# Patient Record
Sex: MALE | Race: WHITE | NOT HISPANIC OR LATINO | Employment: OTHER | URBAN - METROPOLITAN AREA
[De-identification: names, ages, dates, MRNs, and addresses within clinical notes are randomized per-mention and may not be internally consistent; named-entity substitution may affect disease eponyms.]

---

## 2023-02-27 PROBLEM — N40.0 BPH (BENIGN PROSTATIC HYPERPLASIA): Status: ACTIVE | Noted: 2023-02-27

## 2023-02-27 PROBLEM — I10 HYPERTENSION: Status: ACTIVE | Noted: 2023-02-27

## 2023-02-27 PROBLEM — C45.9: Status: ACTIVE | Noted: 2023-02-27

## 2023-02-27 PROBLEM — M19.90 INFLAMMATORY ARTHROPATHY: Status: ACTIVE | Noted: 2023-02-27

## 2023-02-27 PROBLEM — R94.2 ABNORMAL PET OF RIGHT LUNG: Status: ACTIVE | Noted: 2023-02-27

## 2023-02-27 PROBLEM — J94.8 PLEURAL MASS: Status: ACTIVE | Noted: 2023-02-27

## 2023-02-27 PROBLEM — F33.41 RECURRENT MAJOR DEPRESSIVE DISORDER, IN PARTIAL REMISSION (CMS-HCC): Status: ACTIVE | Noted: 2023-02-27

## 2023-02-27 PROBLEM — Z20.822 CLOSE EXPOSURE TO COVID-19 VIRUS: Status: ACTIVE | Noted: 2023-02-27

## 2023-02-27 PROBLEM — M54.2 NECK PAIN: Status: ACTIVE | Noted: 2023-02-27

## 2023-02-27 PROBLEM — M77.9 BONE SPUR: Status: ACTIVE | Noted: 2023-02-27

## 2023-02-27 PROBLEM — E78.00 HYPERCHOLESTEROLEMIA: Status: ACTIVE | Noted: 2023-02-27

## 2023-02-27 PROBLEM — M54.17 LUMBOSACRAL NEURITIS: Status: ACTIVE | Noted: 2023-02-27

## 2023-02-27 PROBLEM — I25.10 CAD (CORONARY ARTERY DISEASE): Status: ACTIVE | Noted: 2023-02-27

## 2023-02-27 PROBLEM — N52.9 MALE ERECTILE DISORDER: Status: ACTIVE | Noted: 2023-02-27

## 2023-02-27 PROBLEM — R10.9 ABDOMINAL CRAMPING: Status: ACTIVE | Noted: 2023-02-27

## 2023-02-27 PROBLEM — G47.00 INSOMNIA: Status: ACTIVE | Noted: 2023-02-27

## 2023-02-27 PROBLEM — R97.20 ELEVATED PSA: Status: ACTIVE | Noted: 2023-02-27

## 2023-02-27 PROBLEM — B00.9 HSV-1 INFECTION: Status: ACTIVE | Noted: 2023-02-27

## 2023-02-27 PROBLEM — Z95.5 HISTORY OF HEART ARTERY STENT: Status: ACTIVE | Noted: 2023-02-27

## 2023-02-27 RX ORDER — DILTIAZEM HYDROCHLORIDE EXTENDED-RELEASE TABLETS 240 MG/1
1 TABLET, EXTENDED RELEASE ORAL DAILY
COMMUNITY
End: 2023-03-10 | Stop reason: SDUPTHER

## 2023-02-27 RX ORDER — LEVOTHYROXINE SODIUM 100 UG/1
1 TABLET ORAL DAILY
COMMUNITY
Start: 2022-09-22 | End: 2024-01-30 | Stop reason: SDUPTHER

## 2023-02-27 RX ORDER — DIPHENHYDRAMINE HCL 25 MG
CAPSULE ORAL
COMMUNITY
Start: 2022-11-03 | End: 2023-10-10 | Stop reason: ALTCHOICE

## 2023-02-27 RX ORDER — ATORVASTATIN CALCIUM 80 MG/1
1 TABLET, FILM COATED ORAL DAILY
COMMUNITY
Start: 2021-06-24 | End: 2023-03-23

## 2023-02-27 RX ORDER — PREDNISONE 2.5 MG/1
TABLET ORAL
COMMUNITY
Start: 2022-09-22 | End: 2023-10-10 | Stop reason: SDUPTHER

## 2023-02-27 RX ORDER — HYDROCHLOROTHIAZIDE 12.5 MG/1
1 TABLET ORAL DAILY
COMMUNITY
Start: 2022-01-07 | End: 2023-03-20 | Stop reason: SDUPTHER

## 2023-02-27 RX ORDER — HYDROXYZINE HYDROCHLORIDE 25 MG/1
TABLET, FILM COATED ORAL
COMMUNITY
Start: 2022-11-10 | End: 2023-10-10 | Stop reason: ALTCHOICE

## 2023-02-27 RX ORDER — DILTIAZEM HYDROCHLORIDE 240 MG/1
1 CAPSULE, EXTENDED RELEASE ORAL DAILY
COMMUNITY
Start: 2021-05-26 | End: 2023-03-10 | Stop reason: SDUPTHER

## 2023-02-27 RX ORDER — METOPROLOL SUCCINATE 50 MG/1
1 TABLET, EXTENDED RELEASE ORAL 2 TIMES DAILY
COMMUNITY
Start: 2013-07-03 | End: 2023-03-23

## 2023-02-27 RX ORDER — ASPIRIN 81 MG/1
TABLET ORAL
COMMUNITY

## 2023-02-27 RX ORDER — NITROGLYCERIN 0.4 MG/1
TABLET SUBLINGUAL
COMMUNITY
Start: 2013-08-14 | End: 2023-10-10 | Stop reason: SDUPTHER

## 2023-03-10 DIAGNOSIS — I10 PRIMARY HYPERTENSION: Primary | ICD-10-CM

## 2023-03-10 RX ORDER — DILTIAZEM HYDROCHLORIDE 240 MG/1
240 CAPSULE, EXTENDED RELEASE ORAL DAILY
Qty: 30 CAPSULE | Refills: 0 | Status: SHIPPED | OUTPATIENT
Start: 2023-03-10 | End: 2023-04-10

## 2023-03-20 DIAGNOSIS — I10 PRIMARY HYPERTENSION: Primary | ICD-10-CM

## 2023-03-20 RX ORDER — HYDROCHLOROTHIAZIDE 12.5 MG/1
12.5 TABLET ORAL DAILY
Qty: 90 TABLET | Refills: 1 | Status: SHIPPED | OUTPATIENT
Start: 2023-03-20 | End: 2023-10-10 | Stop reason: SDUPTHER

## 2023-03-23 DIAGNOSIS — E78.00 HYPERCHOLESTEROLEMIA: ICD-10-CM

## 2023-03-23 DIAGNOSIS — I10 PRIMARY HYPERTENSION: Primary | ICD-10-CM

## 2023-03-23 RX ORDER — METOPROLOL SUCCINATE 50 MG/1
TABLET, EXTENDED RELEASE ORAL
Qty: 180 TABLET | Refills: 0 | Status: SHIPPED | OUTPATIENT
Start: 2023-03-23 | End: 2023-05-30

## 2023-03-23 RX ORDER — ATORVASTATIN CALCIUM 80 MG/1
TABLET, FILM COATED ORAL
Qty: 90 TABLET | Refills: 0 | Status: SHIPPED | OUTPATIENT
Start: 2023-03-23 | End: 2023-05-30

## 2023-03-23 RX ORDER — DILTIAZEM HYDROCHLORIDE 240 MG/1
CAPSULE, COATED, EXTENDED RELEASE ORAL
Qty: 90 CAPSULE | Refills: 0 | Status: SHIPPED | OUTPATIENT
Start: 2023-03-23 | End: 2023-05-30

## 2023-04-07 ENCOUNTER — OFFICE VISIT (OUTPATIENT)
Dept: PRIMARY CARE | Facility: CLINIC | Age: 78
End: 2023-04-07
Payer: MEDICARE

## 2023-04-07 VITALS
HEIGHT: 69 IN | BODY MASS INDEX: 29.77 KG/M2 | TEMPERATURE: 97.5 F | HEART RATE: 72 BPM | WEIGHT: 201 LBS | RESPIRATION RATE: 12 BRPM | SYSTOLIC BLOOD PRESSURE: 132 MMHG | DIASTOLIC BLOOD PRESSURE: 72 MMHG | OXYGEN SATURATION: 98 %

## 2023-04-07 DIAGNOSIS — E78.00 HYPERCHOLESTEROLEMIA: ICD-10-CM

## 2023-04-07 DIAGNOSIS — I10 PRIMARY HYPERTENSION: ICD-10-CM

## 2023-04-07 DIAGNOSIS — N40.1 BENIGN PROSTATIC HYPERPLASIA WITH NOCTURIA: ICD-10-CM

## 2023-04-07 DIAGNOSIS — M19.90 INFLAMMATORY ARTHROPATHY: ICD-10-CM

## 2023-04-07 DIAGNOSIS — I25.10 CORONARY ARTERY DISEASE INVOLVING NATIVE CORONARY ARTERY OF NATIVE HEART WITHOUT ANGINA PECTORIS: ICD-10-CM

## 2023-04-07 DIAGNOSIS — C45.9 MALIGNANT NEOPLASM OF MESOTHELIAL TISSUE (MULTI): Primary | ICD-10-CM

## 2023-04-07 DIAGNOSIS — R35.1 BENIGN PROSTATIC HYPERPLASIA WITH NOCTURIA: ICD-10-CM

## 2023-04-07 DIAGNOSIS — R97.20 ELEVATED PSA: ICD-10-CM

## 2023-04-07 DIAGNOSIS — F33.41 RECURRENT MAJOR DEPRESSIVE DISORDER, IN PARTIAL REMISSION (CMS-HCC): ICD-10-CM

## 2023-04-07 PROBLEM — J94.8 PLEURAL MASS: Status: RESOLVED | Noted: 2023-02-27 | Resolved: 2023-04-07

## 2023-04-07 PROBLEM — G47.00 INSOMNIA: Status: RESOLVED | Noted: 2023-02-27 | Resolved: 2023-04-07

## 2023-04-07 PROBLEM — R10.9 ABDOMINAL CRAMPING: Status: RESOLVED | Noted: 2023-02-27 | Resolved: 2023-04-07

## 2023-04-07 PROCEDURE — 3078F DIAST BP <80 MM HG: CPT | Performed by: FAMILY MEDICINE

## 2023-04-07 PROCEDURE — 3075F SYST BP GE 130 - 139MM HG: CPT | Performed by: FAMILY MEDICINE

## 2023-04-07 PROCEDURE — 99214 OFFICE O/P EST MOD 30 MIN: CPT | Performed by: FAMILY MEDICINE

## 2023-04-07 PROCEDURE — 1159F MED LIST DOCD IN RCRD: CPT | Performed by: FAMILY MEDICINE

## 2023-04-07 PROCEDURE — 1036F TOBACCO NON-USER: CPT | Performed by: FAMILY MEDICINE

## 2023-04-07 ASSESSMENT — ENCOUNTER SYMPTOMS
DIFFICULTY URINATING: 0
DYSURIA: 0
CHILLS: 0
COUGH: 0
DIARRHEA: 0
BLOOD IN STOOL: 0
DYSPHORIC MOOD: 0
ARTHRALGIAS: 1
CHEST TIGHTNESS: 0
HEADACHES: 0
CONSTIPATION: 0
BACK PAIN: 0
ABDOMINAL DISTENTION: 0
ABDOMINAL PAIN: 0
WEAKNESS: 0
FATIGUE: 0
ADENOPATHY: 0
EYE PAIN: 0
DIZZINESS: 0
EYE REDNESS: 0
APPETITE CHANGE: 0
SHORTNESS OF BREATH: 0
LOSS OF SENSATION IN FEET: 1
NERVOUS/ANXIOUS: 0
DEPRESSION: 0
BRUISES/BLEEDS EASILY: 0
SORE THROAT: 0
OCCASIONAL FEELINGS OF UNSTEADINESS: 0
FEVER: 0

## 2023-04-07 NOTE — PROGRESS NOTES
Subjective   Patient ID: Ehsan Dias is a 77 y.o. male who presents for Follow-up.    HPI     Review of Systems    Objective   There were no vitals taken for this visit.    Physical Exam    Assessment/Plan

## 2023-04-07 NOTE — PROGRESS NOTES
Subjective   Patient ID: Ehsan Dias is a 77 y.o. male who presents for Follow-up.  Still on treatment for Mesothelioma, Nivolumab. Still having issues with inflammatory arthropathy up to 10mg on Pred from Rheumatology. Also taking Tylenol as needed.     Pt has chronic HTN.  Pt is taking Dilt, hydrochlorothiazide, Metoprolol. Tolerating well.  Exercising 0 days per week   Low sodium diet is usually being followed.   Is not monitoring home blood pressures.  Denies HA, vision changes or CP.     Pt has Dyslipidemia.   Lipid panel due to recheck.  Currently taking Atorvastatin and is tolerating well without muscle pains or weakness.     BPH/ elevated PSA is pending paulson with Urology  . Pt has 1-2 x nightly nocturia. Urinary stream is good and he denies difficulty starting urination or emptying bladder.     Pt has chronic, stable hypothyroidism.   Taking Synthroid  TSH  in good range.  Energy is  good. Pt denies significant weight gain, low mood, constipation, or skin changes.   Medication is tolerated well without anxiety, tremors, palpitations, involuntary weight loss, heat intolerance or diarrhea.         Review of Systems   Constitutional:  Negative for appetite change, chills, fatigue and fever.   HENT:  Negative for congestion, hearing loss and sore throat.    Eyes:  Negative for pain, redness and visual disturbance.   Respiratory:  Negative for cough, chest tightness and shortness of breath.    Cardiovascular:  Negative for chest pain and leg swelling.   Gastrointestinal:  Negative for abdominal distention, abdominal pain, blood in stool, constipation and diarrhea.   Genitourinary:  Negative for difficulty urinating and dysuria.   Musculoskeletal:  Positive for arthralgias. Negative for back pain.   Skin:  Negative for rash.   Neurological:  Negative for dizziness, weakness and headaches.   Hematological:  Negative for adenopathy. Does not bruise/bleed easily.   Psychiatric/Behavioral:  Negative for dysphoric  "mood. The patient is not nervous/anxious.        Objective   /72   Pulse 72   Temp 36.4 °C (97.5 °F)   Resp 12   Ht 1.753 m (5' 9\")   Wt 91.2 kg (201 lb)   SpO2 98%   BMI 29.68 kg/m²    Physical Exam  Constitutional:       General: He is not in acute distress.     Appearance: Normal appearance.   Cardiovascular:      Rate and Rhythm: Normal rate and regular rhythm.      Heart sounds: Normal heart sounds. No murmur heard.  Pulmonary:      Effort: Pulmonary effort is normal.      Breath sounds: Normal breath sounds.   Abdominal:      Palpations: Abdomen is soft.      Tenderness: There is no abdominal tenderness.   Neurological:      Mental Status: He is alert.   Psychiatric:         Mood and Affect: Mood normal.         Judgment: Judgment normal.           Assessment/Plan   Diagnoses and all orders for this visit:  Malignant neoplasm of mesothelial tissue (CMS/HCC) - doing better, still having joint issues, to see Rhuem. Continue to monitor with oncology  Primary hypertension - stable, continue current meds and monitor.  Coronary artery disease  - hx stent, stable   Inflammatory arthropathy - worsening, on higher dose Pred, keep plan to see Rheum  Hypercholesterolemia - doing well on statin, recheck with labs.   Recurrent major depressive disorder, in partial remission (CMS/HCC) -stable without medication or counseling, monitor  Benign prostatic hyperplasia with nocturia/Elevated PSA - keep plan to see Urology.  Weight - recommend low carb diet, increasing water intake to at least 64oz/day, healthy snacking between meals, and regular cardiovascular exercise 150mins/week. Goal for weight loss is 1-2# per week.        "

## 2023-04-09 DIAGNOSIS — I10 PRIMARY HYPERTENSION: ICD-10-CM

## 2023-04-10 RX ORDER — DILTIAZEM HYDROCHLORIDE 240 MG/1
CAPSULE, EXTENDED RELEASE ORAL
Qty: 90 CAPSULE | Refills: 1 | Status: SHIPPED | OUTPATIENT
Start: 2023-04-10 | End: 2023-10-10 | Stop reason: ALTCHOICE

## 2023-05-01 LAB
MUCUS, URINE: ABNORMAL /LPF
RBC, URINE: 9 /HPF (ref 0–5)
SQUAMOUS EPITHELIAL CELLS, URINE: <1 /HPF
WBC CLUMPS, URINE: ABNORMAL /HPF
WBC, URINE: 4 /HPF (ref 0–5)

## 2023-05-12 LAB
HEPATITIS B VIRUS CORE IGM AB PRESENCE IN SER/PLAS BY IMMUNOASSY: NONREACTIVE
HEPATITIS B VIRUS SURFACE AG PRESENCE IN SERUM: NONREACTIVE
HEPATITIS C VIRUS AB PRESENCE IN SERUM: NONREACTIVE

## 2023-05-16 LAB — GLUCOSE-6-PHOSPHATE DEHYDROGENASE, QUANT: 16.8 U/G HB (ref 9.9–16.6)

## 2023-05-28 DIAGNOSIS — I10 PRIMARY HYPERTENSION: ICD-10-CM

## 2023-05-28 DIAGNOSIS — E78.00 HYPERCHOLESTEROLEMIA: ICD-10-CM

## 2023-05-30 DIAGNOSIS — J20.9 ACUTE BRONCHITIS, UNSPECIFIED: ICD-10-CM

## 2023-05-30 RX ORDER — ATORVASTATIN CALCIUM 80 MG/1
TABLET, FILM COATED ORAL
Qty: 90 TABLET | Refills: 1 | Status: SHIPPED | OUTPATIENT
Start: 2023-05-30 | End: 2023-10-10 | Stop reason: SDUPTHER

## 2023-05-30 RX ORDER — METOPROLOL SUCCINATE 50 MG/1
TABLET, EXTENDED RELEASE ORAL
Qty: 180 TABLET | Refills: 1 | Status: SHIPPED | OUTPATIENT
Start: 2023-05-30 | End: 2023-10-10 | Stop reason: SDUPTHER

## 2023-05-30 RX ORDER — DILTIAZEM HYDROCHLORIDE 240 MG/1
CAPSULE, COATED, EXTENDED RELEASE ORAL
Qty: 90 CAPSULE | Refills: 1 | Status: SHIPPED | OUTPATIENT
Start: 2023-05-30 | End: 2023-10-10 | Stop reason: SDUPTHER

## 2023-05-30 RX ORDER — ALBUTEROL SULFATE 90 UG/1
AEROSOL, METERED RESPIRATORY (INHALATION)
Qty: 18 G | Refills: 1 | Status: SHIPPED | OUTPATIENT
Start: 2023-05-30 | End: 2024-01-18 | Stop reason: WASHOUT

## 2023-09-15 PROBLEM — D50.0 ANEMIA DUE TO BLOOD LOSS: Status: ACTIVE | Noted: 2023-09-15

## 2023-09-15 PROBLEM — R18.8 ASCITES: Status: ACTIVE | Noted: 2023-09-15

## 2023-09-15 PROBLEM — M62.08 DIASTASIS OF RECTUS ABDOMINIS: Status: ACTIVE | Noted: 2023-09-15

## 2023-09-15 PROBLEM — R53.83 OTHER FATIGUE: Status: ACTIVE | Noted: 2023-09-15

## 2023-09-15 PROBLEM — F66 GENDER IDENTITY UNCERTAINTY IN ADULT: Status: ACTIVE | Noted: 2023-09-15

## 2023-09-15 PROBLEM — L85.3 XEROSIS CUTIS: Status: ACTIVE | Noted: 2022-11-15

## 2023-09-15 PROBLEM — R07.9 RIGHT-SIDED CHEST PAIN: Status: ACTIVE | Noted: 2023-09-15

## 2023-09-15 PROBLEM — R31.9 HEMATURIA: Status: ACTIVE | Noted: 2023-09-15

## 2023-09-15 PROBLEM — L57.9 SKIN CHANGES DUE TO CHRONIC EXPOSURE TO NONIONIZING RADIATION, UNSPECIFIED: Status: ACTIVE | Noted: 2022-11-15

## 2023-09-15 PROBLEM — R06.02 SOB (SHORTNESS OF BREATH) ON EXERTION: Status: ACTIVE | Noted: 2023-09-15

## 2023-09-15 PROBLEM — A04.8 HELICOBACTER PYLORI INFECTION: Status: ACTIVE | Noted: 2023-09-15

## 2023-09-15 PROBLEM — L27.0 GENERALIZED SKIN ERUPTION DUE TO DRUGS AND MEDICAMENTS TAKEN INTERNALLY: Status: ACTIVE | Noted: 2022-11-15

## 2023-09-15 PROBLEM — M06.4 POLYARTHRITIS, INFLAMMATORY (MULTI): Status: ACTIVE | Noted: 2023-09-15

## 2023-09-15 PROBLEM — R23.8 SKIN IRRITATION: Status: ACTIVE | Noted: 2023-09-15

## 2023-09-15 PROBLEM — L72.0 EPIDERMOID CYST: Status: ACTIVE | Noted: 2023-09-15

## 2023-09-15 PROBLEM — B00.1 RECURRENT COLD SORES: Status: ACTIVE | Noted: 2023-09-15

## 2023-09-15 PROBLEM — L29.9 PRURITUS OF SKIN: Status: ACTIVE | Noted: 2023-09-15

## 2023-09-15 PROBLEM — L82.1 OTHER SEBORRHEIC KERATOSIS: Status: ACTIVE | Noted: 2022-11-15

## 2023-09-15 PROBLEM — R23.8 DRY SCALP: Status: ACTIVE | Noted: 2023-09-15

## 2023-09-15 PROBLEM — B34.9 VIREMIA: Status: ACTIVE | Noted: 2023-09-15

## 2023-09-15 PROBLEM — J34.0 CELLULITIS OF EXTERNAL NOSE: Status: ACTIVE | Noted: 2023-09-15

## 2023-09-15 PROBLEM — L73.9 FOLLICULITIS: Status: ACTIVE | Noted: 2023-09-15

## 2023-09-15 PROBLEM — L51.1 STEVENS-JOHNSON SYNDROME (MULTI): Status: ACTIVE | Noted: 2023-09-15

## 2023-09-15 PROBLEM — M99.08 SEGMENTAL AND SOMATIC DYSFUNCTION OF RIB CAGE: Status: ACTIVE | Noted: 2023-09-15

## 2023-09-15 PROBLEM — J90 PLEURAL EFFUSION: Status: ACTIVE | Noted: 2023-09-15

## 2023-09-15 PROBLEM — R07.81 RIB PAIN ON RIGHT SIDE: Status: ACTIVE | Noted: 2023-09-15

## 2023-09-15 PROBLEM — F32.A DEPRESSION: Status: ACTIVE | Noted: 2023-09-15

## 2023-09-15 PROBLEM — I25.5 ISCHEMIC CARDIOMYOPATHY: Status: ACTIVE | Noted: 2023-09-15

## 2023-09-15 PROBLEM — J18.9 COMMUNITY ACQUIRED PNEUMONIA: Status: ACTIVE | Noted: 2023-09-15

## 2023-09-15 PROBLEM — R60.0 EDEMA, LOWER EXTREMITY: Status: ACTIVE | Noted: 2023-09-15

## 2023-09-15 PROBLEM — R00.0 TACHYCARDIA: Status: ACTIVE | Noted: 2023-09-15

## 2023-09-15 RX ORDER — PREDNISONE 5 MG/1
1 TABLET ORAL 2 TIMES DAILY
COMMUNITY
Start: 2023-02-14 | End: 2024-01-30 | Stop reason: SDUPTHER

## 2023-09-15 RX ORDER — BENAZEPRIL HYDROCHLORIDE 20 MG/1
20 TABLET ORAL
COMMUNITY
End: 2023-10-10 | Stop reason: ALTCHOICE

## 2023-09-15 RX ORDER — DESONIDE 0.5 MG/G
1 CREAM TOPICAL
COMMUNITY
Start: 2019-01-24 | End: 2023-10-10 | Stop reason: ALTCHOICE

## 2023-09-15 RX ORDER — NYSTATIN 100000 [USP'U]/ML
4 SUSPENSION ORAL 4 TIMES DAILY
COMMUNITY
Start: 2022-10-26 | End: 2023-10-10 | Stop reason: ALTCHOICE

## 2023-09-15 RX ORDER — CLOBETASOL PROPIONATE 0.5 MG/G
OINTMENT TOPICAL 2 TIMES DAILY
COMMUNITY
Start: 2022-06-01 | End: 2023-10-17 | Stop reason: WASHOUT

## 2023-09-15 RX ORDER — FINASTERIDE 5 MG/1
5 TABLET, FILM COATED ORAL
COMMUNITY
Start: 2017-03-22 | End: 2023-10-10 | Stop reason: ALTCHOICE

## 2023-09-15 RX ORDER — METHOTREXATE 2.5 MG/1
6 TABLET ORAL
COMMUNITY
End: 2023-10-10 | Stop reason: ALTCHOICE

## 2023-09-15 RX ORDER — LISINOPRIL 10 MG/1
1 TABLET ORAL DAILY
COMMUNITY
Start: 2023-07-14 | End: 2023-10-10 | Stop reason: SDUPTHER

## 2023-09-15 RX ORDER — ESTRADIOL 2 MG/1
2 TABLET ORAL 2 TIMES DAILY
COMMUNITY
Start: 2018-06-06 | End: 2023-10-10 | Stop reason: ALTCHOICE

## 2023-09-15 RX ORDER — TACROLIMUS 1 MG/G
1 OINTMENT TOPICAL
COMMUNITY
Start: 2019-01-22 | End: 2023-10-10 | Stop reason: ALTCHOICE

## 2023-09-15 RX ORDER — HYDROXYCHLOROQUINE SULFATE 200 MG/1
200 TABLET, FILM COATED ORAL
COMMUNITY
Start: 2022-10-13 | End: 2023-10-10 | Stop reason: SINTOL

## 2023-09-15 RX ORDER — FOLIC ACID 1 MG/1
1 TABLET ORAL DAILY
COMMUNITY
End: 2023-10-10 | Stop reason: ALTCHOICE

## 2023-09-15 RX ORDER — BENZONATATE 200 MG/1
1 CAPSULE ORAL 3 TIMES DAILY PRN
COMMUNITY
Start: 2023-01-12 | End: 2023-10-10 | Stop reason: ALTCHOICE

## 2023-09-15 RX ORDER — SILDENAFIL 100 MG/1
100 TABLET, FILM COATED ORAL AS NEEDED
COMMUNITY
Start: 2023-05-01 | End: 2023-10-10 | Stop reason: ALTCHOICE

## 2023-09-15 RX ORDER — CALCIPOTRIENE 50 UG/G
CREAM TOPICAL 2 TIMES DAILY
COMMUNITY
Start: 2022-11-22

## 2023-09-15 RX ORDER — SIMVASTATIN 40 MG/1
40 TABLET, FILM COATED ORAL EVERY EVENING
COMMUNITY
End: 2023-10-10 | Stop reason: ALTCHOICE

## 2023-09-15 RX ORDER — MIRTAZAPINE 7.5 MG/1
.5-1 TABLET, FILM COATED ORAL EVERY EVENING
COMMUNITY
Start: 2017-03-04 | End: 2023-10-10 | Stop reason: ALTCHOICE

## 2023-10-04 ENCOUNTER — INFUSION (OUTPATIENT)
Dept: HEMATOLOGY/ONCOLOGY | Facility: HOSPITAL | Age: 78
End: 2023-10-04
Payer: MEDICARE

## 2023-10-04 VITALS
DIASTOLIC BLOOD PRESSURE: 80 MMHG | BODY MASS INDEX: 30.54 KG/M2 | RESPIRATION RATE: 18 BRPM | OXYGEN SATURATION: 98 % | SYSTOLIC BLOOD PRESSURE: 147 MMHG | WEIGHT: 206.79 LBS | HEART RATE: 88 BPM | TEMPERATURE: 97.2 F

## 2023-10-04 DIAGNOSIS — C45.9 MALIGNANT NEOPLASM OF MESOTHELIAL TISSUE (MULTI): Primary | ICD-10-CM

## 2023-10-04 DIAGNOSIS — C45.9 MALIGNANT NEOPLASM OF MESOTHELIAL TISSUE (MULTI): ICD-10-CM

## 2023-10-04 LAB
ALBUMIN SERPL BCP-MCNC: 4.4 G/DL (ref 3.4–5)
ALP SERPL-CCNC: 87 U/L (ref 33–136)
ALT SERPL W P-5'-P-CCNC: 66 U/L (ref 10–52)
ANION GAP SERPL CALC-SCNC: 12 MMOL/L (ref 10–20)
AST SERPL W P-5'-P-CCNC: 35 U/L (ref 9–39)
BASOPHILS # BLD AUTO: 0.06 X10*3/UL (ref 0–0.1)
BASOPHILS NFR BLD AUTO: 0.7 %
BILIRUB SERPL-MCNC: 0.5 MG/DL (ref 0–1.2)
BUN SERPL-MCNC: 20 MG/DL (ref 6–23)
CALCIUM SERPL-MCNC: 9.7 MG/DL (ref 8.6–10.3)
CHLORIDE SERPL-SCNC: 101 MMOL/L (ref 98–107)
CO2 SERPL-SCNC: 31 MMOL/L (ref 21–32)
CREAT SERPL-MCNC: 0.81 MG/DL (ref 0.5–1.3)
EOSINOPHIL # BLD AUTO: 0.1 X10*3/UL (ref 0–0.4)
EOSINOPHIL NFR BLD AUTO: 1.2 %
ERYTHROCYTE [DISTWIDTH] IN BLOOD BY AUTOMATED COUNT: 14.7 % (ref 11.5–14.5)
GFR SERPL CREATININE-BSD FRML MDRD: 90 ML/MIN/1.73M*2
GLUCOSE SERPL-MCNC: 76 MG/DL (ref 74–99)
HCT VFR BLD AUTO: 44.1 % (ref 41–52)
HGB BLD-MCNC: 14.4 G/DL (ref 13.5–17.5)
IMM GRANULOCYTES # BLD AUTO: 0.05 X10*3/UL (ref 0–0.5)
IMM GRANULOCYTES NFR BLD AUTO: 0.6 % (ref 0–0.9)
LYMPHOCYTES # BLD AUTO: 1.5 X10*3/UL (ref 0.8–3)
LYMPHOCYTES NFR BLD AUTO: 17.3 %
MCH RBC QN AUTO: 30.6 PG (ref 26–34)
MCHC RBC AUTO-ENTMCNC: 32.7 G/DL (ref 32–36)
MCV RBC AUTO: 94 FL (ref 80–100)
MONOCYTES # BLD AUTO: 0.92 X10*3/UL (ref 0.05–0.8)
MONOCYTES NFR BLD AUTO: 10.6 %
NEUTROPHILS # BLD AUTO: 6.02 X10*3/UL (ref 1.6–5.5)
NEUTROPHILS NFR BLD AUTO: 69.6 %
NRBC BLD-RTO: 0 /100 WBCS (ref 0–0)
PLATELET # BLD AUTO: 273 X10*3/UL (ref 150–450)
PMV BLD AUTO: 10.3 FL (ref 7.5–11.5)
POTASSIUM SERPL-SCNC: 4 MMOL/L (ref 3.5–5.3)
PROT SERPL-MCNC: 7.4 G/DL (ref 6.4–8.2)
RBC # BLD AUTO: 4.71 X10*6/UL (ref 4.5–5.9)
SODIUM SERPL-SCNC: 140 MMOL/L (ref 136–145)
TSH SERPL-ACNC: 3.77 MIU/L (ref 0.44–3.98)
WBC # BLD AUTO: 8.7 X10*3/UL (ref 4.4–11.3)

## 2023-10-04 PROCEDURE — 2500000004 HC RX 250 GENERAL PHARMACY W/ HCPCS (ALT 636 FOR OP/ED): Mod: JZ | Performed by: CLINICAL NURSE SPECIALIST

## 2023-10-04 PROCEDURE — 96413 CHEMO IV INFUSION 1 HR: CPT

## 2023-10-04 PROCEDURE — 36415 COLL VENOUS BLD VENIPUNCTURE: CPT

## 2023-10-04 PROCEDURE — 85025 COMPLETE CBC W/AUTO DIFF WBC: CPT

## 2023-10-04 PROCEDURE — 84443 ASSAY THYROID STIM HORMONE: CPT

## 2023-10-04 PROCEDURE — 82024 ASSAY OF ACTH: CPT

## 2023-10-04 PROCEDURE — 80053 COMPREHEN METABOLIC PANEL: CPT

## 2023-10-04 RX ORDER — HEPARIN SODIUM,PORCINE/PF 10 UNIT/ML
50 SYRINGE (ML) INTRAVENOUS AS NEEDED
Status: CANCELLED | OUTPATIENT
Start: 2023-10-04

## 2023-10-04 RX ORDER — PROCHLORPERAZINE MALEATE 10 MG
10 TABLET ORAL EVERY 6 HOURS PRN
Status: DISCONTINUED | OUTPATIENT
Start: 2023-10-04 | End: 2023-10-04 | Stop reason: HOSPADM

## 2023-10-04 RX ORDER — EPINEPHRINE 0.3 MG/.3ML
0.3 INJECTION SUBCUTANEOUS EVERY 5 MIN PRN
Status: DISCONTINUED | OUTPATIENT
Start: 2023-10-04 | End: 2023-10-04 | Stop reason: HOSPADM

## 2023-10-04 RX ORDER — EPINEPHRINE 0.3 MG/.3ML
0.3 INJECTION SUBCUTANEOUS EVERY 5 MIN PRN
Status: CANCELLED | OUTPATIENT
Start: 2023-10-18

## 2023-10-04 RX ORDER — FAMOTIDINE 10 MG/ML
20 INJECTION INTRAVENOUS ONCE AS NEEDED
Status: CANCELLED | OUTPATIENT
Start: 2023-10-04

## 2023-10-04 RX ORDER — ALBUTEROL SULFATE 0.83 MG/ML
3 SOLUTION RESPIRATORY (INHALATION) AS NEEDED
Status: CANCELLED | OUTPATIENT
Start: 2023-10-18

## 2023-10-04 RX ORDER — ALBUTEROL SULFATE 0.83 MG/ML
3 SOLUTION RESPIRATORY (INHALATION) AS NEEDED
Status: DISCONTINUED | OUTPATIENT
Start: 2023-10-04 | End: 2023-10-04 | Stop reason: HOSPADM

## 2023-10-04 RX ORDER — PROCHLORPERAZINE MALEATE 10 MG
10 TABLET ORAL EVERY 6 HOURS PRN
Status: CANCELLED | OUTPATIENT
Start: 2023-10-04

## 2023-10-04 RX ORDER — DIPHENHYDRAMINE HYDROCHLORIDE 50 MG/ML
50 INJECTION INTRAMUSCULAR; INTRAVENOUS AS NEEDED
Status: CANCELLED | OUTPATIENT
Start: 2023-10-04

## 2023-10-04 RX ORDER — HEPARIN 100 UNIT/ML
500 SYRINGE INTRAVENOUS AS NEEDED
Status: CANCELLED | OUTPATIENT
Start: 2023-10-04

## 2023-10-04 RX ORDER — PROCHLORPERAZINE MALEATE 10 MG
10 TABLET ORAL EVERY 6 HOURS PRN
Status: CANCELLED | OUTPATIENT
Start: 2023-10-18

## 2023-10-04 RX ORDER — PROCHLORPERAZINE EDISYLATE 5 MG/ML
10 INJECTION INTRAMUSCULAR; INTRAVENOUS EVERY 6 HOURS PRN
Status: CANCELLED | OUTPATIENT
Start: 2023-10-18

## 2023-10-04 RX ORDER — EPINEPHRINE 0.3 MG/.3ML
0.3 INJECTION SUBCUTANEOUS EVERY 5 MIN PRN
Status: CANCELLED | OUTPATIENT
Start: 2023-10-04

## 2023-10-04 RX ORDER — PROCHLORPERAZINE EDISYLATE 5 MG/ML
10 INJECTION INTRAMUSCULAR; INTRAVENOUS EVERY 6 HOURS PRN
Status: CANCELLED | OUTPATIENT
Start: 2023-10-04

## 2023-10-04 RX ORDER — FAMOTIDINE 10 MG/ML
20 INJECTION INTRAVENOUS ONCE AS NEEDED
Status: CANCELLED | OUTPATIENT
Start: 2023-10-18

## 2023-10-04 RX ORDER — DIPHENHYDRAMINE HYDROCHLORIDE 50 MG/ML
50 INJECTION INTRAMUSCULAR; INTRAVENOUS AS NEEDED
Status: CANCELLED | OUTPATIENT
Start: 2023-10-18

## 2023-10-04 RX ORDER — PROCHLORPERAZINE EDISYLATE 5 MG/ML
10 INJECTION INTRAMUSCULAR; INTRAVENOUS EVERY 6 HOURS PRN
Status: DISCONTINUED | OUTPATIENT
Start: 2023-10-04 | End: 2023-10-04 | Stop reason: HOSPADM

## 2023-10-04 RX ORDER — FAMOTIDINE 10 MG/ML
20 INJECTION INTRAVENOUS ONCE AS NEEDED
Status: DISCONTINUED | OUTPATIENT
Start: 2023-10-04 | End: 2023-10-04 | Stop reason: HOSPADM

## 2023-10-04 RX ORDER — DIPHENHYDRAMINE HYDROCHLORIDE 50 MG/ML
50 INJECTION INTRAMUSCULAR; INTRAVENOUS AS NEEDED
Status: DISCONTINUED | OUTPATIENT
Start: 2023-10-04 | End: 2023-10-04 | Stop reason: HOSPADM

## 2023-10-04 RX ORDER — ALBUTEROL SULFATE 0.83 MG/ML
3 SOLUTION RESPIRATORY (INHALATION) AS NEEDED
Status: CANCELLED | OUTPATIENT
Start: 2023-10-04

## 2023-10-04 RX ADMIN — SODIUM CHLORIDE 240 MG: 9 INJECTION, SOLUTION INTRAVENOUS at 15:14

## 2023-10-04 ASSESSMENT — PAIN SCALES - GENERAL: PAINLEVEL: 0-NO PAIN

## 2023-10-06 LAB — ACTH PLAS-MCNC: 4.1 PG/ML (ref 7.2–63.3)

## 2023-10-09 ENCOUNTER — LAB (OUTPATIENT)
Dept: LAB | Facility: LAB | Age: 78
End: 2023-10-09
Payer: MEDICARE

## 2023-10-09 DIAGNOSIS — I25.5 ISCHEMIC CARDIOMYOPATHY: ICD-10-CM

## 2023-10-09 DIAGNOSIS — I10 ESSENTIAL (PRIMARY) HYPERTENSION: ICD-10-CM

## 2023-10-09 DIAGNOSIS — E78.00 PURE HYPERCHOLESTEROLEMIA, UNSPECIFIED: ICD-10-CM

## 2023-10-09 DIAGNOSIS — E78.00 PURE HYPERCHOLESTEROLEMIA, UNSPECIFIED: Primary | ICD-10-CM

## 2023-10-09 LAB
CHOLEST SERPL-MCNC: 135 MG/DL (ref 0–199)
CHOLESTEROL/HDL RATIO: 3.5
HDLC SERPL-MCNC: 38.6 MG/DL
LDLC SERPL CALC-MCNC: 63 MG/DL (ref 140–190)
NON HDL CHOLESTEROL: 96 MG/DL (ref 0–149)
TRIGL SERPL-MCNC: 169 MG/DL (ref 0–149)
VLDL: 34 MG/DL (ref 0–40)

## 2023-10-09 PROCEDURE — 80061 LIPID PANEL: CPT

## 2023-10-09 PROCEDURE — 36415 COLL VENOUS BLD VENIPUNCTURE: CPT

## 2023-10-10 ENCOUNTER — OFFICE VISIT (OUTPATIENT)
Dept: PRIMARY CARE | Facility: CLINIC | Age: 78
End: 2023-10-10
Payer: MEDICARE

## 2023-10-10 VITALS
WEIGHT: 209 LBS | HEIGHT: 69 IN | BODY MASS INDEX: 30.96 KG/M2 | HEART RATE: 90 BPM | OXYGEN SATURATION: 97 % | RESPIRATION RATE: 12 BRPM | SYSTOLIC BLOOD PRESSURE: 128 MMHG | DIASTOLIC BLOOD PRESSURE: 74 MMHG

## 2023-10-10 DIAGNOSIS — M19.90 INFLAMMATORY ARTHROPATHY: ICD-10-CM

## 2023-10-10 DIAGNOSIS — I10 PRIMARY HYPERTENSION: ICD-10-CM

## 2023-10-10 DIAGNOSIS — E78.00 HYPERCHOLESTEROLEMIA: ICD-10-CM

## 2023-10-10 DIAGNOSIS — I25.5 ISCHEMIC CARDIOMYOPATHY: ICD-10-CM

## 2023-10-10 DIAGNOSIS — C45.9 MALIGNANT NEOPLASM OF MESOTHELIAL TISSUE (MULTI): Primary | ICD-10-CM

## 2023-10-10 DIAGNOSIS — R35.1 BENIGN PROSTATIC HYPERPLASIA WITH NOCTURIA: ICD-10-CM

## 2023-10-10 DIAGNOSIS — C78.6 SECONDARY MALIGNANT NEOPLASM OF RETROPERITONEUM AND PERITONEUM (MULTI): ICD-10-CM

## 2023-10-10 DIAGNOSIS — N40.1 BENIGN PROSTATIC HYPERPLASIA WITH NOCTURIA: ICD-10-CM

## 2023-10-10 DIAGNOSIS — I70.0 ATHEROSCLEROSIS OF AORTA (CMS-HCC): ICD-10-CM

## 2023-10-10 DIAGNOSIS — R97.20 ELEVATED PSA: ICD-10-CM

## 2023-10-10 DIAGNOSIS — I25.10 CORONARY ARTERY DISEASE INVOLVING NATIVE CORONARY ARTERY OF NATIVE HEART WITHOUT ANGINA PECTORIS: ICD-10-CM

## 2023-10-10 DIAGNOSIS — M06.4 POLYARTHRITIS, INFLAMMATORY (MULTI): ICD-10-CM

## 2023-10-10 PROBLEM — Z20.822 CLOSE EXPOSURE TO COVID-19 VIRUS: Status: RESOLVED | Noted: 2023-02-27 | Resolved: 2023-10-10

## 2023-10-10 PROBLEM — L73.9 FOLLICULITIS: Status: RESOLVED | Noted: 2023-09-15 | Resolved: 2023-10-10

## 2023-10-10 PROBLEM — B00.9 HSV-1 INFECTION: Status: RESOLVED | Noted: 2023-02-27 | Resolved: 2023-10-10

## 2023-10-10 PROBLEM — R07.81 RIB PAIN ON RIGHT SIDE: Status: RESOLVED | Noted: 2023-09-15 | Resolved: 2023-10-10

## 2023-10-10 PROBLEM — L85.3 XEROSIS CUTIS: Status: RESOLVED | Noted: 2022-11-15 | Resolved: 2023-10-10

## 2023-10-10 PROBLEM — B34.9 VIREMIA: Status: RESOLVED | Noted: 2023-09-15 | Resolved: 2023-10-10

## 2023-10-10 PROBLEM — R23.8 SKIN IRRITATION: Status: RESOLVED | Noted: 2023-09-15 | Resolved: 2023-10-10

## 2023-10-10 PROBLEM — L51.1 STEVENS-JOHNSON SYNDROME (MULTI): Status: RESOLVED | Noted: 2023-09-15 | Resolved: 2023-10-10

## 2023-10-10 PROBLEM — J90 PLEURAL EFFUSION: Status: RESOLVED | Noted: 2023-09-15 | Resolved: 2023-10-10

## 2023-10-10 PROBLEM — R53.83 OTHER FATIGUE: Status: RESOLVED | Noted: 2023-09-15 | Resolved: 2023-10-10

## 2023-10-10 PROBLEM — R07.9 RIGHT-SIDED CHEST PAIN: Status: RESOLVED | Noted: 2023-09-15 | Resolved: 2023-10-10

## 2023-10-10 PROBLEM — R60.0 EDEMA, LOWER EXTREMITY: Status: RESOLVED | Noted: 2023-09-15 | Resolved: 2023-10-10

## 2023-10-10 PROBLEM — F33.41 RECURRENT MAJOR DEPRESSIVE DISORDER, IN PARTIAL REMISSION (CMS-HCC): Status: RESOLVED | Noted: 2023-02-27 | Resolved: 2023-10-10

## 2023-10-10 PROBLEM — J18.9 COMMUNITY ACQUIRED PNEUMONIA: Status: RESOLVED | Noted: 2023-09-15 | Resolved: 2023-10-10

## 2023-10-10 PROBLEM — F32.A DEPRESSION: Status: RESOLVED | Noted: 2023-09-15 | Resolved: 2023-10-10

## 2023-10-10 PROBLEM — J34.0 CELLULITIS OF EXTERNAL NOSE: Status: RESOLVED | Noted: 2023-09-15 | Resolved: 2023-10-10

## 2023-10-10 PROCEDURE — 3078F DIAST BP <80 MM HG: CPT | Performed by: FAMILY MEDICINE

## 2023-10-10 PROCEDURE — 3074F SYST BP LT 130 MM HG: CPT | Performed by: FAMILY MEDICINE

## 2023-10-10 PROCEDURE — 1036F TOBACCO NON-USER: CPT | Performed by: FAMILY MEDICINE

## 2023-10-10 PROCEDURE — 1160F RVW MEDS BY RX/DR IN RCRD: CPT | Performed by: FAMILY MEDICINE

## 2023-10-10 PROCEDURE — 1159F MED LIST DOCD IN RCRD: CPT | Performed by: FAMILY MEDICINE

## 2023-10-10 PROCEDURE — 99214 OFFICE O/P EST MOD 30 MIN: CPT | Performed by: FAMILY MEDICINE

## 2023-10-10 PROCEDURE — 1126F AMNT PAIN NOTED NONE PRSNT: CPT | Performed by: FAMILY MEDICINE

## 2023-10-10 RX ORDER — HYDROCHLOROTHIAZIDE 12.5 MG/1
12.5 TABLET ORAL DAILY
Qty: 90 TABLET | Refills: 1 | Status: SHIPPED | OUTPATIENT
Start: 2023-10-10 | End: 2024-05-11 | Stop reason: SDUPTHER

## 2023-10-10 RX ORDER — DILTIAZEM HYDROCHLORIDE 240 MG/1
240 CAPSULE, COATED, EXTENDED RELEASE ORAL DAILY
Qty: 90 CAPSULE | Refills: 1 | Status: SHIPPED | OUTPATIENT
Start: 2023-10-10 | End: 2024-05-11 | Stop reason: SDUPTHER

## 2023-10-10 RX ORDER — LISINOPRIL 10 MG/1
10 TABLET ORAL DAILY
Qty: 90 TABLET | Refills: 1 | Status: SHIPPED | OUTPATIENT
Start: 2023-10-10 | End: 2024-04-25

## 2023-10-10 RX ORDER — METOPROLOL SUCCINATE 50 MG/1
50 TABLET, EXTENDED RELEASE ORAL 2 TIMES DAILY
Qty: 180 TABLET | Refills: 1 | Status: SHIPPED | OUTPATIENT
Start: 2023-10-10 | End: 2024-05-11 | Stop reason: SDUPTHER

## 2023-10-10 RX ORDER — NITROGLYCERIN 0.4 MG/1
0.4 TABLET SUBLINGUAL EVERY 5 MIN PRN
Qty: 12 TABLET | Refills: 1 | Status: SHIPPED | OUTPATIENT
Start: 2023-10-10

## 2023-10-10 RX ORDER — ATORVASTATIN CALCIUM 80 MG/1
80 TABLET, FILM COATED ORAL DAILY
Qty: 90 TABLET | Refills: 1 | Status: SHIPPED | OUTPATIENT
Start: 2023-10-10 | End: 2024-05-11 | Stop reason: SDUPTHER

## 2023-10-10 ASSESSMENT — ENCOUNTER SYMPTOMS
DYSURIA: 0
DIFFICULTY URINATING: 0
CONSTIPATION: 0
CHILLS: 0
CHEST TIGHTNESS: 0
ABDOMINAL PAIN: 0
APPETITE CHANGE: 0
DIZZINESS: 0
FATIGUE: 0
EYE REDNESS: 0
NERVOUS/ANXIOUS: 0
ADENOPATHY: 0
EYE PAIN: 0
DIARRHEA: 0
COUGH: 0
BLOOD IN STOOL: 0
SHORTNESS OF BREATH: 0
FEVER: 0
HEADACHES: 0
BACK PAIN: 0
DYSPHORIC MOOD: 0
ABDOMINAL DISTENTION: 0
SORE THROAT: 0
WEAKNESS: 0
BRUISES/BLEEDS EASILY: 0
ARTHRALGIAS: 1

## 2023-10-10 NOTE — PROGRESS NOTES
Subjective   Patient ID: Ehsan Dias is a 78 y.o. male who presents for Follow-up.  Pt has Mesothelioma . He is taking Immunotherapy. He was recommended to try methotrexate but declines  Major issue is in paresthesia in hands, worse at night. Also has inflammatory arthropathy ( mostly hips, shoulders, wrists and hands)  Denies heightened SOB or coughing.  Pt has tried Tylenol and Advil for treatment with some improvement.     Pt has chronic CAD, CMP, PVD, and HTN.Follows with cardiology  Pt is taking Dilt, Metoprolol, HCTZ. Tolerating well.  Exercising 2 days per week   Low sodium diet is usually being followed.   Is monitoring home blood pressures. Readings range mostly in 120s/70-80s.  Denies HA, vision changes or CP.     Pt has Dyslipidemia.   Lipid panel showed LDL in good.  Currently taking  and is tolerating well without muscle pains or weakness.     For elevated PSA he had seen Dr Carrington who recommended prostate MRI. He did not want to pursue that so has not followed up. Last PSA was 7.    Pt has chronic, stable hypothyroidism.   Taking Synthroid  Energy is fair. Pt has weight gain ( attributed to immunotherapy medication), denies low mood, constipation, or skin changes.   Medication is tolerated well without anxiety, tremors, palpitations, involuntary weight loss, heat intolerance or diarrhea.             Review of Systems   Constitutional:  Negative for appetite change, chills, fatigue and fever.   HENT:  Negative for congestion, hearing loss and sore throat.    Eyes:  Negative for pain, redness and visual disturbance.   Respiratory:  Negative for cough, chest tightness and shortness of breath.    Cardiovascular:  Negative for chest pain and leg swelling.   Gastrointestinal:  Negative for abdominal distention, abdominal pain, blood in stool, constipation and diarrhea.   Genitourinary:  Negative for difficulty urinating and dysuria.   Musculoskeletal:  Positive for arthralgias. Negative for back pain.  \"You all were great! \" "  Skin:  Negative for rash.   Neurological:  Negative for dizziness, weakness and headaches.   Hematological:  Negative for adenopathy. Does not bruise/bleed easily.   Psychiatric/Behavioral:  Negative for dysphoric mood. The patient is not nervous/anxious.        Objective   /74   Pulse 90   Resp 12   Ht 1.753 m (5' 9\")   Wt 94.8 kg (209 lb)   SpO2 97%   BMI 30.86 kg/m²    Physical Exam  Constitutional:       General: He is not in acute distress.     Appearance: Normal appearance.   Cardiovascular:      Rate and Rhythm: Normal rate and regular rhythm.      Heart sounds: Normal heart sounds. No murmur heard.  Pulmonary:      Effort: Pulmonary effort is normal.      Breath sounds: Normal breath sounds.   Abdominal:      Palpations: Abdomen is soft.      Tenderness: There is no abdominal tenderness.   Neurological:      Mental Status: He is alert.   Psychiatric:         Mood and Affect: Mood normal.         Judgment: Judgment normal.           Assessment/Plan   Diagnoses and all orders for this visit:  Malignant neoplasm of mesothelial tissue - plan is to continue with Immunotherapy for an additional 6 months, per Oncology  Primary hypertension- well controlled. Continue current medications and monitor.  Hypercholesterolemia-doing well on statin, continue and monitor with labs.  Inflammatory arthropathy - continue NSAID ( Ie. Advil or Aleve) as needed. On Prednisone per Rheumatology.   Coronary artery disease /Ischemic cardiomyopathy/ PVD - asymptomatic, monitoring with cardiology  Elevated PSA - discussed need for follow up with Urology  Weight - recommend low carb diet, increasing water intake to at least 64oz/day, healthy snacking between meals, and regular cardiovascular exercise 150mins/week. Goal for weight loss is 1-2# per week.   Follow up in 6 months, 30mins. Preventative due in January     "

## 2023-10-10 NOTE — PROGRESS NOTES
"Subjective   Patient ID: Ehsan Dias is a 78 y.o. male who presents for Follow-up.    HPI     Review of Systems    Objective   /74   Pulse 90   Resp 12   Ht 1.753 m (5' 9\")   Wt 94.8 kg (209 lb)   SpO2 97%   BMI 30.86 kg/m²     Physical Exam    Assessment/Plan          "

## 2023-10-13 ENCOUNTER — ANCILLARY PROCEDURE (OUTPATIENT)
Dept: RADIOLOGY | Facility: CLINIC | Age: 78
End: 2023-10-13
Payer: MEDICARE

## 2023-10-13 DIAGNOSIS — C45.9 MESOTHELIOMA, UNSPECIFIED (MULTI): ICD-10-CM

## 2023-10-13 DIAGNOSIS — R18.8 OTHER ASCITES: ICD-10-CM

## 2023-10-13 PROCEDURE — 2550000001 HC RX 255 CONTRASTS: Performed by: CLINICAL NURSE SPECIALIST

## 2023-10-13 PROCEDURE — 74177 CT ABD & PELVIS W/CONTRAST: CPT | Performed by: RADIOLOGY

## 2023-10-13 PROCEDURE — 71260 CT THORAX DX C+: CPT | Performed by: RADIOLOGY

## 2023-10-13 PROCEDURE — 74177 CT ABD & PELVIS W/CONTRAST: CPT

## 2023-10-13 RX ADMIN — IOHEXOL 70 ML: 350 INJECTION, SOLUTION INTRAVENOUS at 10:20

## 2023-10-16 PROBLEM — A04.8 HELICOBACTER PYLORI INFECTION: Status: RESOLVED | Noted: 2023-09-15 | Resolved: 2023-10-16

## 2023-10-16 PROBLEM — L82.1 OTHER SEBORRHEIC KERATOSIS: Status: RESOLVED | Noted: 2022-11-15 | Resolved: 2023-10-16

## 2023-10-16 PROBLEM — L27.0 GENERALIZED SKIN ERUPTION DUE TO DRUGS AND MEDICAMENTS TAKEN INTERNALLY: Status: RESOLVED | Noted: 2022-11-15 | Resolved: 2023-10-16

## 2023-10-16 PROBLEM — F66 GENDER IDENTITY UNCERTAINTY IN ADULT: Chronic | Status: ACTIVE | Noted: 2023-09-15

## 2023-10-16 PROBLEM — I70.0 ATHEROSCLEROSIS OF AORTA (CMS-HCC): Chronic | Status: ACTIVE | Noted: 2023-10-10

## 2023-10-16 PROBLEM — L57.9 SKIN CHANGES DUE TO CHRONIC EXPOSURE TO NONIONIZING RADIATION, UNSPECIFIED: Status: RESOLVED | Noted: 2022-11-15 | Resolved: 2023-10-16

## 2023-10-16 PROBLEM — M77.9 BONE SPUR: Status: RESOLVED | Noted: 2023-02-27 | Resolved: 2023-10-16

## 2023-10-16 PROBLEM — M54.17 LUMBOSACRAL NEURITIS: Status: RESOLVED | Noted: 2023-02-27 | Resolved: 2023-10-16

## 2023-10-16 PROBLEM — D50.0 ANEMIA DUE TO BLOOD LOSS: Chronic | Status: ACTIVE | Noted: 2023-09-15

## 2023-10-16 PROBLEM — M54.2 NECK PAIN: Status: RESOLVED | Noted: 2023-02-27 | Resolved: 2023-10-16

## 2023-10-16 PROBLEM — C45.9 MESOTHELIOMA (MULTI): Status: ACTIVE | Noted: 2021-05-07

## 2023-10-16 PROBLEM — L72.0 EPIDERMOID CYST: Status: RESOLVED | Noted: 2023-09-15 | Resolved: 2023-10-16

## 2023-10-16 PROBLEM — L29.9 PRURITUS OF SKIN: Status: RESOLVED | Noted: 2023-09-15 | Resolved: 2023-10-16

## 2023-10-16 PROBLEM — R23.8 DRY SCALP: Status: RESOLVED | Noted: 2023-09-15 | Resolved: 2023-10-16

## 2023-10-16 PROBLEM — M99.08 SEGMENTAL AND SOMATIC DYSFUNCTION OF RIB CAGE: Status: RESOLVED | Noted: 2023-09-15 | Resolved: 2023-10-16

## 2023-10-16 PROBLEM — F32.9 MAJOR DEPRESSIVE DISORDER, SINGLE EPISODE: Status: ACTIVE | Noted: 2021-05-07

## 2023-10-16 PROBLEM — M54.30 SCIATICA: Status: RESOLVED | Noted: 2023-10-16 | Resolved: 2023-10-16

## 2023-10-16 PROBLEM — M54.30 SCIATICA: Status: ACTIVE | Noted: 2023-10-16

## 2023-10-16 PROBLEM — R31.9 HEMATURIA: Status: RESOLVED | Noted: 2023-09-15 | Resolved: 2023-10-16

## 2023-10-16 PROBLEM — I25.10 CAD (CORONARY ARTERY DISEASE): Chronic | Status: ACTIVE | Noted: 2023-02-27

## 2023-10-16 PROBLEM — C78.6 SECONDARY MALIGNANT NEOPLASM OF RETROPERITONEUM AND PERITONEUM (MULTI): Chronic | Status: ACTIVE | Noted: 2023-10-10

## 2023-10-16 PROBLEM — R06.02 SOB (SHORTNESS OF BREATH) ON EXERTION: Chronic | Status: ACTIVE | Noted: 2023-09-15

## 2023-10-16 PROBLEM — E78.00 HYPERCHOLESTEROLEMIA: Chronic | Status: ACTIVE | Noted: 2023-02-27

## 2023-10-16 PROBLEM — M62.08 DIASTASIS OF RECTUS ABDOMINIS: Status: RESOLVED | Noted: 2023-09-15 | Resolved: 2023-10-16

## 2023-10-16 PROBLEM — I10 HYPERTENSION: Chronic | Status: ACTIVE | Noted: 2023-02-27

## 2023-10-16 PROBLEM — C45.9: Chronic | Status: ACTIVE | Noted: 2023-02-27

## 2023-10-16 PROBLEM — Z95.5 HISTORY OF HEART ARTERY STENT: Status: RESOLVED | Noted: 2023-02-27 | Resolved: 2023-10-16

## 2023-10-16 PROBLEM — I25.5 ISCHEMIC CARDIOMYOPATHY: Chronic | Status: ACTIVE | Noted: 2023-09-15

## 2023-10-17 ENCOUNTER — OFFICE VISIT (OUTPATIENT)
Dept: CARDIOLOGY | Facility: CLINIC | Age: 78
End: 2023-10-17
Payer: MEDICARE

## 2023-10-17 VITALS
WEIGHT: 210 LBS | DIASTOLIC BLOOD PRESSURE: 76 MMHG | SYSTOLIC BLOOD PRESSURE: 122 MMHG | HEART RATE: 94 BPM | OXYGEN SATURATION: 93 % | BODY MASS INDEX: 31.01 KG/M2

## 2023-10-17 DIAGNOSIS — E78.00 HYPERCHOLESTEROLEMIA: Chronic | ICD-10-CM

## 2023-10-17 DIAGNOSIS — R06.02 SOB (SHORTNESS OF BREATH) ON EXERTION: Chronic | ICD-10-CM

## 2023-10-17 DIAGNOSIS — I10 PRIMARY HYPERTENSION: Chronic | ICD-10-CM

## 2023-10-17 DIAGNOSIS — I25.10 CORONARY ARTERY DISEASE INVOLVING NATIVE CORONARY ARTERY OF NATIVE HEART WITHOUT ANGINA PECTORIS: Primary | Chronic | ICD-10-CM

## 2023-10-17 PROCEDURE — 99213 OFFICE O/P EST LOW 20 MIN: CPT | Performed by: INTERNAL MEDICINE

## 2023-10-17 PROCEDURE — 1126F AMNT PAIN NOTED NONE PRSNT: CPT | Performed by: INTERNAL MEDICINE

## 2023-10-17 PROCEDURE — 1159F MED LIST DOCD IN RCRD: CPT | Performed by: INTERNAL MEDICINE

## 2023-10-17 PROCEDURE — 3074F SYST BP LT 130 MM HG: CPT | Performed by: INTERNAL MEDICINE

## 2023-10-17 PROCEDURE — 1036F TOBACCO NON-USER: CPT | Performed by: INTERNAL MEDICINE

## 2023-10-17 PROCEDURE — 3078F DIAST BP <80 MM HG: CPT | Performed by: INTERNAL MEDICINE

## 2023-10-17 PROCEDURE — 1160F RVW MEDS BY RX/DR IN RCRD: CPT | Performed by: INTERNAL MEDICINE

## 2023-10-17 RX ORDER — ALBUTEROL SULFATE 90 UG/1
1 AEROSOL, METERED RESPIRATORY (INHALATION) EVERY 4 HOURS PRN
COMMUNITY
Start: 2021-05-01 | End: 2023-10-17 | Stop reason: WASHOUT

## 2023-10-17 NOTE — PROGRESS NOTES
Referred by No ref. provider found    TABITHA Moser is here for follow-up.  Feeling well.  Tolerating lisinopril.  Blood pressures at home have been excellent other than when he gets immunotherapy at times it goes up into the 140 range.    Past Medical History:  Problem List Items Addressed This Visit    None       Past Medical History:   Diagnosis Date    Abdominal cramping 02/27/2023    CAD (coronary artery disease) 02/27/2023    Inferior wall MI, s/p FROY x2 to RCA. 70% on revascularized LAD. 2001 [facility].    Depression 09/15/2023    Edema, lower extremity 09/15/2023    Hypercholesterolemia 02/27/2023    Dr. David Quinn    Hypertension     Insomnia 02/27/2023    Ischemic cardiomyopathy 09/15/2023    EF 50% on echo of 9/7/2022    Other fatigue 09/15/2023    Personal history of diseases of the blood and blood-forming organs and certain disorders involving the immune mechanism 07/20/2017    History of thrombocytosis    Personal history of malignant neoplasm of soft tissue 11/18/2021    History of malignant mesothelioma    Personal history of other diseases of male genital organs 11/11/2019    History of impotence    Personal history of transient ischemic attack (TIA), and cerebral infarction without residual deficits 12/14/2021    History of transient ischemic attack    Pleural mass 02/27/2023    Recurrent major depressive disorder, in partial remission (CMS/HCC) 02/27/2023    Skin irritation 09/15/2023    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      Social History:  He reports that he has never smoked. He has never used smokeless tobacco. He reports that he does not currently use alcohol. He reports that he does not currently use drugs.    Family History:  Family History   Problem Relation Name Age of Onset    Heart disease Mother      Other (cardiac disorder) Mother      Heart disease Father      Other (cardiac disorder) Father      Coronary artery disease Brother          Allergies:  Hydroxychloroquine    Outpatient Medications:  Current Outpatient Medications   Medication Instructions    albuterol 90 mcg/actuation inhaler TAKE 2 PUFFS SCHEDULED EVERY 6-8 HOURS FOR THE NEXT 5 DAYS THEN AS NEEDED EVERY 4-6 HOURS    aspirin 81 mg EC tablet oral    atorvastatin (LIPITOR) 80 mg, oral, Daily    calcipotriene (Dovonex) 0.005 % cream Topical, 2 times daily    clobetasol (Temovate) 0.05 % ointment Topical, 2 times daily    dilTIAZem CD (CARDIZEM CD) 240 mg, oral, Daily    hydroCHLOROthiazide (HYDRODIURIL) 12.5 mg, oral, Daily    levothyroxine (Synthroid, Levoxyl) 100 mcg tablet 1 tablet, oral, Daily    lisinopril 10 mg, oral, Daily    metoprolol succinate XL (TOPROL-XL) 50 mg, oral, 2 times daily, Do not crush or chew.    nitroglycerin (NITROSTAT) 0.4 mg, sublingual, Every 5 min PRN    NIVOLUMAB IV intravenous, Every 14 days    predniSONE (Deltasone) 5 mg tablet 1 tablet, oral, 2 times daily        Last Recorded Vitals:  There were no vitals filed for this visit.    Physical Exam    Physical  Patient is alert and oriented x3.  HEENT is unremarkable mucous members are moist  Neck no JVP no bruits upstrokes are full no thyromegaly  Lungs are clear bilaterally.  No wheezing crackles or rales  Heart regular rhythm normal S1-S2 there is no S3 no murmurs are heard.  Abdomen is soft vessels are positive nontender nondistended no organomegaly no pulsatile  "masses  Extremities have no edema.  Distal pulses present palpable.  Neuro is grossly nonfocal  Skin has no rashes     Last Labs:  CBC -  Lab Results   Component Value Date    WBC 8.7 10/04/2023    HGB 14.4 10/04/2023    HCT 44.1 10/04/2023    MCV 94 10/04/2023     10/04/2023       CMP -  Lab Results   Component Value Date    CALCIUM 9.7 10/04/2023    PHOS 3.9 09/23/2021    PROT 7.4 10/04/2023    ALBUMIN 4.4 10/04/2023    AST 35 10/04/2023    ALT 66 (H) 10/04/2023    ALKPHOS 87 10/04/2023    BILITOT 0.5 10/04/2023       LIPID PANEL -   Lab Results   Component Value Date    CHOL 135 10/09/2023    HDL 38.6 10/09/2023    CHHDL 3.5 10/09/2023    VLDL 34 10/09/2023    TRIG 169 (H) 10/09/2023    NHDL 96 10/09/2023       RENAL FUNCTION PANEL -   Lab Results   Component Value Date    K 4.0 10/04/2023    PHOS 3.9 09/23/2021       No results found for: \"BNP\", \"HGBA1C\"           Assessment/Plan   1. CAD. Inferiorwall MI 2001 status post stenting x2 right coronary artery. At that time 70% and unrevascularized LAD.  stress test 2021 no ischemia in the LAD territory. Scar in the inferior wall. Continue with medical therapy. No symptoms of angina. Last tress test 2021 with evidence of scar in the inferior wall.     2. Hyperlipidemia. Followed by Dr. Calle.10/2023 LDL 63, HDL 39 these numbers are excellent.  Increasing his activity will help with his HDL cholesterol.    3. Hypertension. Excellent today. Renal fxn ok on lisinopril     4. Tachycardia. Heart rates are in the 80s now. This diltiazem, and the twice daily metoprolol has helped.     5. Lower extremity edema.  No edema currently.  Sometimes related to immunotherapy and weight gain.    6. Mesothelioma. 99% resected. Now receiving immunotherapy. Doing really quite well. He has had consequences from immunotherapy which he is dealing with.     Overall from a cardiac standpoint doing well.  We discussed the importance of 30 minutes of continuous aerobic activity.  He " is limited because of hip pain.  He is joining the [address] [place] [address] and therefore hopefully will be able to use a pole.  My plan will be to see him back in 1 year.  I will see him sooner if any issues arise.yeny Ambrocio MD     Instructions and follow up

## 2023-10-17 NOTE — PATIENT INSTRUCTIONS
1. CAD. Inferiorwall MI 2001 status post stenting x2 right coronary artery. At that time 70% and unrevascularized LAD.  stress test 2021 no ischemia in the LAD territory. Scar in the inferior wall. Continue with medical therapy. No symptoms of angina. Last tress test 2021 with evidence of scar in the inferior wall.     2. Hyperlipidemia. Followed by Dr. Calle.10/2023 LDL 63, HDL 39 these numbers are excellent.  Increasing his activity will help with his HDL cholesterol.    3. Hypertension. Excellent today. Renal fxn ok on lisinopril     4. Tachycardia. Heart rates are in the 80s now. This diltiazem, and the twice daily metoprolol has helped.     5. Lower extremity edema.  No edema currently.  Sometimes related to immunotherapy and weight gain.    6. Mesothelioma. 99% resected. Now receiving immunotherapy. Doing really quite well. He has had consequences from immunotherapy which he is dealing with.     Overall from a cardiac standpoint doing well.  We discussed the importance of 30 minutes of continuous aerobic activity.  He is limited because of hip pain.  He is joining the [address] [place] [address] and therefore hopefully will be able to use a pole.  My plan will be to see him back in 1 year.  I will see him sooner if any issues arise.t

## 2023-10-18 ENCOUNTER — INFUSION (OUTPATIENT)
Dept: HEMATOLOGY/ONCOLOGY | Facility: HOSPITAL | Age: 78
End: 2023-10-18
Payer: MEDICARE

## 2023-10-18 ENCOUNTER — OFFICE VISIT (OUTPATIENT)
Dept: HEMATOLOGY/ONCOLOGY | Facility: HOSPITAL | Age: 78
End: 2023-10-18
Payer: MEDICARE

## 2023-10-18 VITALS
OXYGEN SATURATION: 98 % | WEIGHT: 206.2 LBS | HEART RATE: 81 BPM | TEMPERATURE: 98.1 F | SYSTOLIC BLOOD PRESSURE: 140 MMHG | BODY MASS INDEX: 29.52 KG/M2 | DIASTOLIC BLOOD PRESSURE: 71 MMHG | HEIGHT: 70 IN | RESPIRATION RATE: 17 BRPM

## 2023-10-18 DIAGNOSIS — C45.9 MALIGNANT NEOPLASM OF MESOTHELIAL TISSUE (MULTI): ICD-10-CM

## 2023-10-18 DIAGNOSIS — C45.9 MESOTHELIOMA (MULTI): Primary | ICD-10-CM

## 2023-10-18 LAB
ALBUMIN SERPL BCP-MCNC: 4.3 G/DL (ref 3.4–5)
ALP SERPL-CCNC: 76 U/L (ref 33–136)
ALT SERPL W P-5'-P-CCNC: 57 U/L (ref 10–52)
ANION GAP SERPL CALC-SCNC: 12 MMOL/L (ref 10–20)
AST SERPL W P-5'-P-CCNC: 35 U/L (ref 9–39)
BASOPHILS # BLD AUTO: 0.04 X10*3/UL (ref 0–0.1)
BASOPHILS NFR BLD AUTO: 0.6 %
BILIRUB SERPL-MCNC: 0.6 MG/DL (ref 0–1.2)
BUN SERPL-MCNC: 19 MG/DL (ref 6–23)
CALCIUM SERPL-MCNC: 9.5 MG/DL (ref 8.6–10.3)
CHLORIDE SERPL-SCNC: 104 MMOL/L (ref 98–107)
CO2 SERPL-SCNC: 28 MMOL/L (ref 21–32)
CREAT SERPL-MCNC: 0.76 MG/DL (ref 0.5–1.3)
EOSINOPHIL # BLD AUTO: 0.13 X10*3/UL (ref 0–0.4)
EOSINOPHIL NFR BLD AUTO: 1.9 %
ERYTHROCYTE [DISTWIDTH] IN BLOOD BY AUTOMATED COUNT: 14.8 % (ref 11.5–14.5)
GFR SERPL CREATININE-BSD FRML MDRD: >90 ML/MIN/1.73M*2
GLUCOSE SERPL-MCNC: 84 MG/DL (ref 74–99)
HCT VFR BLD AUTO: 42.4 % (ref 41–52)
HGB BLD-MCNC: 14.2 G/DL (ref 13.5–17.5)
IMM GRANULOCYTES # BLD AUTO: 0.04 X10*3/UL (ref 0–0.5)
IMM GRANULOCYTES NFR BLD AUTO: 0.6 % (ref 0–0.9)
LYMPHOCYTES # BLD AUTO: 1.54 X10*3/UL (ref 0.8–3)
LYMPHOCYTES NFR BLD AUTO: 22.1 %
MCH RBC QN AUTO: 31.1 PG (ref 26–34)
MCHC RBC AUTO-ENTMCNC: 33.5 G/DL (ref 32–36)
MCV RBC AUTO: 93 FL (ref 80–100)
MONOCYTES # BLD AUTO: 0.83 X10*3/UL (ref 0.05–0.8)
MONOCYTES NFR BLD AUTO: 11.9 %
NEUTROPHILS # BLD AUTO: 4.39 X10*3/UL (ref 1.6–5.5)
NEUTROPHILS NFR BLD AUTO: 62.9 %
NRBC BLD-RTO: 0 /100 WBCS (ref 0–0)
PLATELET # BLD AUTO: 282 X10*3/UL (ref 150–450)
PMV BLD AUTO: 10.3 FL (ref 7.5–11.5)
POTASSIUM SERPL-SCNC: 4 MMOL/L (ref 3.5–5.3)
PROT SERPL-MCNC: 7.1 G/DL (ref 6.4–8.2)
RBC # BLD AUTO: 4.56 X10*6/UL (ref 4.5–5.9)
SODIUM SERPL-SCNC: 140 MMOL/L (ref 136–145)
WBC # BLD AUTO: 7 X10*3/UL (ref 4.4–11.3)

## 2023-10-18 PROCEDURE — 84075 ASSAY ALKALINE PHOSPHATASE: CPT

## 2023-10-18 PROCEDURE — 99215 OFFICE O/P EST HI 40 MIN: CPT | Performed by: INTERNAL MEDICINE

## 2023-10-18 PROCEDURE — 2500000004 HC RX 250 GENERAL PHARMACY W/ HCPCS (ALT 636 FOR OP/ED): Mod: JZ | Performed by: CLINICAL NURSE SPECIALIST

## 2023-10-18 PROCEDURE — 1036F TOBACCO NON-USER: CPT | Performed by: INTERNAL MEDICINE

## 2023-10-18 PROCEDURE — 1126F AMNT PAIN NOTED NONE PRSNT: CPT | Performed by: INTERNAL MEDICINE

## 2023-10-18 PROCEDURE — 3077F SYST BP >= 140 MM HG: CPT | Performed by: INTERNAL MEDICINE

## 2023-10-18 PROCEDURE — 36415 COLL VENOUS BLD VENIPUNCTURE: CPT

## 2023-10-18 PROCEDURE — 3078F DIAST BP <80 MM HG: CPT | Performed by: INTERNAL MEDICINE

## 2023-10-18 PROCEDURE — 96413 CHEMO IV INFUSION 1 HR: CPT

## 2023-10-18 PROCEDURE — 1160F RVW MEDS BY RX/DR IN RCRD: CPT | Performed by: INTERNAL MEDICINE

## 2023-10-18 PROCEDURE — 1159F MED LIST DOCD IN RCRD: CPT | Performed by: INTERNAL MEDICINE

## 2023-10-18 PROCEDURE — 85025 COMPLETE CBC W/AUTO DIFF WBC: CPT

## 2023-10-18 RX ORDER — HEPARIN 100 UNIT/ML
500 SYRINGE INTRAVENOUS AS NEEDED
Status: CANCELLED | OUTPATIENT
Start: 2023-10-18

## 2023-10-18 RX ORDER — PROCHLORPERAZINE EDISYLATE 5 MG/ML
10 INJECTION INTRAMUSCULAR; INTRAVENOUS EVERY 6 HOURS PRN
Status: DISCONTINUED | OUTPATIENT
Start: 2023-10-18 | End: 2023-10-18 | Stop reason: HOSPADM

## 2023-10-18 RX ORDER — PROCHLORPERAZINE MALEATE 10 MG
10 TABLET ORAL EVERY 6 HOURS PRN
Status: CANCELLED | OUTPATIENT
Start: 2023-11-01

## 2023-10-18 RX ORDER — FAMOTIDINE 10 MG/ML
20 INJECTION INTRAVENOUS ONCE AS NEEDED
Status: CANCELLED | OUTPATIENT
Start: 2023-11-15

## 2023-10-18 RX ORDER — PROCHLORPERAZINE MALEATE 10 MG
10 TABLET ORAL EVERY 6 HOURS PRN
Status: CANCELLED | OUTPATIENT
Start: 2023-11-15

## 2023-10-18 RX ORDER — ALBUTEROL SULFATE 0.83 MG/ML
3 SOLUTION RESPIRATORY (INHALATION) AS NEEDED
Status: CANCELLED | OUTPATIENT
Start: 2023-11-01

## 2023-10-18 RX ORDER — PROCHLORPERAZINE MALEATE 10 MG
10 TABLET ORAL EVERY 6 HOURS PRN
Status: DISCONTINUED | OUTPATIENT
Start: 2023-10-18 | End: 2023-10-18 | Stop reason: HOSPADM

## 2023-10-18 RX ORDER — HEPARIN SODIUM,PORCINE/PF 10 UNIT/ML
50 SYRINGE (ML) INTRAVENOUS AS NEEDED
Status: CANCELLED | OUTPATIENT
Start: 2023-10-18

## 2023-10-18 RX ORDER — ALBUTEROL SULFATE 0.83 MG/ML
3 SOLUTION RESPIRATORY (INHALATION) AS NEEDED
Status: CANCELLED | OUTPATIENT
Start: 2023-11-15

## 2023-10-18 RX ORDER — FAMOTIDINE 10 MG/ML
20 INJECTION INTRAVENOUS ONCE AS NEEDED
Status: CANCELLED | OUTPATIENT
Start: 2023-11-01

## 2023-10-18 RX ORDER — PROCHLORPERAZINE EDISYLATE 5 MG/ML
10 INJECTION INTRAMUSCULAR; INTRAVENOUS EVERY 6 HOURS PRN
Status: CANCELLED | OUTPATIENT
Start: 2023-11-01

## 2023-10-18 RX ORDER — EPINEPHRINE 0.3 MG/.3ML
0.3 INJECTION SUBCUTANEOUS EVERY 5 MIN PRN
Status: CANCELLED | OUTPATIENT
Start: 2023-11-15

## 2023-10-18 RX ORDER — EPINEPHRINE 0.3 MG/.3ML
0.3 INJECTION SUBCUTANEOUS EVERY 5 MIN PRN
Status: CANCELLED | OUTPATIENT
Start: 2023-11-01

## 2023-10-18 RX ORDER — DIPHENHYDRAMINE HYDROCHLORIDE 50 MG/ML
50 INJECTION INTRAMUSCULAR; INTRAVENOUS AS NEEDED
Status: CANCELLED | OUTPATIENT
Start: 2023-11-01

## 2023-10-18 RX ORDER — DIPHENHYDRAMINE HYDROCHLORIDE 50 MG/ML
50 INJECTION INTRAMUSCULAR; INTRAVENOUS AS NEEDED
Status: CANCELLED | OUTPATIENT
Start: 2023-11-15

## 2023-10-18 RX ORDER — PROCHLORPERAZINE EDISYLATE 5 MG/ML
10 INJECTION INTRAMUSCULAR; INTRAVENOUS EVERY 6 HOURS PRN
Status: CANCELLED | OUTPATIENT
Start: 2023-11-15

## 2023-10-18 RX ADMIN — SODIUM CHLORIDE 240 MG: 9 INJECTION, SOLUTION INTRAVENOUS at 10:59

## 2023-10-18 ASSESSMENT — ENCOUNTER SYMPTOMS
FATIGUE: 0
VISUAL CHANGE: 0
COUGH: 0
HEADACHES: 0
CHANGE IN BOWEL HABIT: 0
ANOREXIA: 0
LOSS OF SENSATION IN FEET: 1
OCCASIONAL FEELINGS OF UNSTEADINESS: 0
SORE THROAT: 0
DEPRESSION: 0
SWOLLEN GLANDS: 0
FEVER: 0
VOMITING: 0
ARTHRALGIAS: 1
DIAPHORESIS: 0
ABDOMINAL PAIN: 0
VERTIGO: 0
JOINT SWELLING: 0
CHILLS: 0
WEAKNESS: 0
NUMBNESS: 0
NAUSEA: 0
NECK PAIN: 0
MYALGIAS: 0

## 2023-10-18 ASSESSMENT — PAIN SCALES - GENERAL: PAINLEVEL: 0-NO PAIN

## 2023-10-18 NOTE — PROGRESS NOTES
Patient ID: Ehsan Dias is a 78 y.o. male.    DIAGNOSIS     Malignant right pleural mesothelioma- epithelioid type.  Date of diagnosis: 4/2/2021 from VATS guided pleural mass biopsy the neoplasm shows solid and papillary growth patterns. IHC of  tumor  cells were POSITIVE for calretinin, WT-1, CK5/6 and D2-40 (weak, focal), and NEGATIVE for CEAm, TTF-1, B72.3 and MOC31,        STAGING     Initial T2N0M0  Progression in chest and peritoneum in January 2022        CURRENT SITES OF DISEASE     pleura, mediastinum, peritoneum        MOLECULAR GENOMICS     MUTYH 48.9%  BAP1 17.5%   Tumor Mutational [place] (TMB): 5.8 m/MB Microsatellite Instability (MSI) Status: Stable           SERUM TUMOR MARKER     Not applicable        PRIOR THERAPY     1- Pleurectomy May 7th , 2021        CURRENT THERAPY     02.23.2022 : Started on Ipilimumab and Nivolumab. Documented clinical and radiographic response.  Ipilimumab dropped and nivolumab alone was continued starting December 21, 2022 given toxicities of the combination.        CURRENT ONCOLOGICAL PROBLEMS     1/ R sided pleuritic chest pain , Improving  2. weight loss- in history it seems like this is intentional (need additional data points in future follow ups) - Gaining weight post surgery  3. dry cough - Significantly improved  4- Polypoid lesion seen on CT scan of the chest within the distal esophagus, referred for endoscopy.,  September 2021  5-  ascites starting Jan 2022.  Paracentesis performed in February suggest malignant ascites from mesothelioma.  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  Seen by rheumatology August 2022,  prescribed Celebrex. 1/18/23 - Prednisone taper 10 mg to current 7.5 mg.  Down to prednisone 5 mg with initiation of methotrexate  in July 2023.  Patient notified me on August 23, 2023 that he is not taking his methotrexate and he is fearful of the side effects.  Continues to have polyarthritis symptoms.  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 He was seen by Dr. Catie Harvey in Pulmonary clinic, routine imaging showed right side pleural effusion.  Diagnostic thoracentesis was performed on 7/13 that showed exudative effusion with neutrophilic predominance, cultures negative, cytology negative for carcinoma.   He completed course oral antibiotics and his pain and effusion resolved.  Again, had recurrence of pleuritic chest pain  and pleural effusion   later in 12/2020. The effusion was found to be worse on repeat chest imaging ( CT Chest in 2/2021). Diagnostic and therapeutic thoracentesis was done which showed exudative effusion with lymphocyte predominance. Given his recurrent ipsilateral moderate  size pleural effusion and near complete collapse of the right lower lobe and mild right middle lobe atelectasis on CT chest- he was referred to CT surgeon and a PET CT was also obtained.      3/2021: PET CT revealed right-sided pleural effusion with multiple hypermetabolic  areas of pleural nodularity visualized along the pleural surface of the right lung, which predominantly involve the right lung base. In addition, there were multiple FDG avid mediastinal lymph nodes including enlarged subcarinal lymph nodes (maximum SUV  of 4.4), paratracheal lymph nodes (maximum SUV of 3.1),  "and right hilar  lymph nodes (maximum SUV of 2.9).     3/23/21: was seen by Dr. Dannielle Junior from CT surgery and was scheduled to get R VATS pleural biopsy on 4/2/21: thorascopic exploration showed some adhesions at the diaphragm to the chest wall and also  to the lung.        PAST MEDICAL HISTORY     CAD with MI s/p PCI to RCA with 2 stents,   OA R shoulder s/p replacement,   HTN   elevated PSA with FDG avidity in post lobe of prostate- followed by Dr. 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   former smoker, quit 1969, 2 packs x 8 year  = 16 pack years   smoked marijuana since 1970s; quit few months ago  Possible exposure to asbestos when worked in the [employer].          CURRENT MEDS     see med list         ALLERGIES     nkda         FAMILY HISTORY     no family h/o mesothelioma        Subjective    Cancer  Associated symptoms include arthralgias. Pertinent negatives include no abdominal pain, anorexia, change in bowel habit, chest pain, chills, congestion, coughing, diaphoresis, fatigue, fever, headaches, joint swelling, myalgias, nausea, neck pain, numbness, rash, sore throat, swollen glands, urinary symptoms, vertigo, visual change, vomiting or weakness.         Objective    BSA: 2.14 meters squared  /71   Pulse 81   Temp 36.7 °C (98.1 °F) (Skin)   Resp 17   Ht 1.769 m (5' 9.65\")   Wt 93.5 kg (206 lb 3.2 oz)   SpO2 98%   BMI 29.89 kg/m²      Physical Exam  Constitutional:       Appearance: Normal appearance. He is normal weight.   HENT:      Head: Normocephalic.      Nose: Nose normal.      Mouth/Throat:      Mouth: Mucous membranes are moist.      Pharynx: Oropharynx is clear.   Eyes:      Conjunctiva/sclera: Conjunctivae normal.      Pupils: Pupils are equal, round, and reactive to light.   Cardiovascular:      Rate and Rhythm: Normal rate and regular rhythm.      Pulses: Normal " pulses.      Heart sounds: Normal heart sounds.   Pulmonary:      Effort: Pulmonary effort is normal.      Comments: Some pleural rub right base  Abdominal:      General: Abdomen is flat. Bowel sounds are normal.      Palpations: Abdomen is soft.   Musculoskeletal:         General: Normal range of motion.      Cervical back: Normal range of motion.   Skin:     General: Skin is warm.   Neurological:      General: No focal deficit present.      Mental Status: He is alert and oriented to person, place, and time.   Psychiatric:         Mood and Affect: Mood normal.         Behavior: Behavior normal.         Performance Status:  Symptomatic; fully ambulatory     Latest Reference Range & Units 10/09/23 09:14   HDL CHOLESTEROL mg/dL 38.6   Cholesterol/HDL Ratio  3.5   LDL Calculated 140 - 190 mg/dL 63 (L)   VLDL 0 - 40 mg/dL 34   TRIGLYCERIDES 0 - 149 mg/dL 169 (H)   Non HDL Cholesterol 0 - 149 mg/dL 96   CHOLESTEROL 0 - 199 mg/dL 135   (L): Data is abnormally low  (H): Data is abnormally high    CT 10/13/2023  IMPRESSION:  Malignant mesothelioma, restaging scan.  1. Stable postoperative changes of right pleurectomy without evidence  of recurrent disease. Lymph nodes are stable. No evidence of new  disease in the chest, abdomen, or pelvis.  2. Stable chronic findings as detailed above.          Assessment/Plan     This is a 78-year-old gentleman with malignant epithelioid right pleural mesothelioma diagnosed nearly 2-1/2 years ago.  He is clinically doing well on checkpoint immunotherapy.  He is at nearly 20 months into his treatment with plans to treat through early February 2024. I personally reviewed his most recent CT scan showing no evidence of disease recurrence.  He has had a complete response to treatment.  His immune related adverse event is inflammatory symmetrical polyarthritis for which she is managed by rheumatology.  He is holding off on pursuing methotrexate treatment.  Uses Tylenol for myalgias pain.  He  also has immunotherapy related vitiligo and immune related hypothyroidism.    Cancer Staging   No matching staging information was found for the patient.    Oncology History   Malignant neoplasm of mesothelial tissue (CMS/HCC)   2/27/2023 Initial Diagnosis    Malignant neoplasm of mesothelial tissue (CMS/HCC)     10/4/2023 -  Chemotherapy    Nivolumab 240 mg (Biweekly), 28 Day Cycles                   Shawn Blum MD

## 2023-10-19 ENCOUNTER — APPOINTMENT (OUTPATIENT)
Dept: RADIOLOGY | Facility: CLINIC | Age: 78
End: 2023-10-19
Payer: MEDICARE

## 2023-11-01 ENCOUNTER — OFFICE VISIT (OUTPATIENT)
Dept: HEMATOLOGY/ONCOLOGY | Facility: HOSPITAL | Age: 78
End: 2023-11-01
Payer: MEDICARE

## 2023-11-01 ENCOUNTER — INFUSION (OUTPATIENT)
Dept: HEMATOLOGY/ONCOLOGY | Facility: HOSPITAL | Age: 78
End: 2023-11-01
Payer: MEDICARE

## 2023-11-01 VITALS
BODY MASS INDEX: 30.6 KG/M2 | DIASTOLIC BLOOD PRESSURE: 61 MMHG | HEIGHT: 69 IN | SYSTOLIC BLOOD PRESSURE: 126 MMHG | TEMPERATURE: 97.2 F | RESPIRATION RATE: 17 BRPM | OXYGEN SATURATION: 98 % | WEIGHT: 206.6 LBS | HEART RATE: 89 BPM

## 2023-11-01 DIAGNOSIS — C45.9 MALIGNANT NEOPLASM OF MESOTHELIAL TISSUE (MULTI): ICD-10-CM

## 2023-11-01 DIAGNOSIS — C45.9 MALIGNANT NEOPLASM OF MESOTHELIAL TISSUE (MULTI): Primary | ICD-10-CM

## 2023-11-01 LAB
ALBUMIN SERPL BCP-MCNC: 4.3 G/DL (ref 3.4–5)
ALP SERPL-CCNC: 84 U/L (ref 33–136)
ALT SERPL W P-5'-P-CCNC: 61 U/L (ref 10–52)
ANION GAP SERPL CALC-SCNC: 11 MMOL/L (ref 10–20)
AST SERPL W P-5'-P-CCNC: 35 U/L (ref 9–39)
BASOPHILS # BLD AUTO: 0.05 X10*3/UL (ref 0–0.1)
BASOPHILS NFR BLD AUTO: 0.6 %
BILIRUB SERPL-MCNC: 0.5 MG/DL (ref 0–1.2)
BUN SERPL-MCNC: 18 MG/DL (ref 6–23)
CALCIUM SERPL-MCNC: 9.8 MG/DL (ref 8.6–10.3)
CHLORIDE SERPL-SCNC: 103 MMOL/L (ref 98–107)
CO2 SERPL-SCNC: 30 MMOL/L (ref 21–32)
CORTIS AM PEAK SERPL-MSCNC: 3.6 UG/DL (ref 5–20)
CREAT SERPL-MCNC: 0.77 MG/DL (ref 0.5–1.3)
EOSINOPHIL # BLD AUTO: 0.1 X10*3/UL (ref 0–0.4)
EOSINOPHIL NFR BLD AUTO: 1.2 %
ERYTHROCYTE [DISTWIDTH] IN BLOOD BY AUTOMATED COUNT: 14.8 % (ref 11.5–14.5)
GFR SERPL CREATININE-BSD FRML MDRD: >90 ML/MIN/1.73M*2
GLUCOSE SERPL-MCNC: 82 MG/DL (ref 74–99)
HCT VFR BLD AUTO: 44.2 % (ref 41–52)
HGB BLD-MCNC: 14.4 G/DL (ref 13.5–17.5)
IMM GRANULOCYTES # BLD AUTO: 0.05 X10*3/UL (ref 0–0.5)
IMM GRANULOCYTES NFR BLD AUTO: 0.6 % (ref 0–0.9)
LYMPHOCYTES # BLD AUTO: 1.74 X10*3/UL (ref 0.8–3)
LYMPHOCYTES NFR BLD AUTO: 20.6 %
MCH RBC QN AUTO: 30.6 PG (ref 26–34)
MCHC RBC AUTO-ENTMCNC: 32.6 G/DL (ref 32–36)
MCV RBC AUTO: 94 FL (ref 80–100)
MONOCYTES # BLD AUTO: 0.85 X10*3/UL (ref 0.05–0.8)
MONOCYTES NFR BLD AUTO: 10 %
NEUTROPHILS # BLD AUTO: 5.67 X10*3/UL (ref 1.6–5.5)
NEUTROPHILS NFR BLD AUTO: 67 %
NRBC BLD-RTO: 0 /100 WBCS (ref 0–0)
PLATELET # BLD AUTO: 287 X10*3/UL (ref 150–450)
PMV BLD AUTO: 10.3 FL (ref 7.5–11.5)
POTASSIUM SERPL-SCNC: 4.3 MMOL/L (ref 3.5–5.3)
PROT SERPL-MCNC: 7.2 G/DL (ref 6.4–8.2)
RBC # BLD AUTO: 4.71 X10*6/UL (ref 4.5–5.9)
SODIUM SERPL-SCNC: 140 MMOL/L (ref 136–145)
TSH SERPL-ACNC: 2.44 MIU/L (ref 0.44–3.98)
WBC # BLD AUTO: 8.5 X10*3/UL (ref 4.4–11.3)

## 2023-11-01 PROCEDURE — 1160F RVW MEDS BY RX/DR IN RCRD: CPT | Performed by: CLINICAL NURSE SPECIALIST

## 2023-11-01 PROCEDURE — 84443 ASSAY THYROID STIM HORMONE: CPT

## 2023-11-01 PROCEDURE — 96413 CHEMO IV INFUSION 1 HR: CPT

## 2023-11-01 PROCEDURE — 1036F TOBACCO NON-USER: CPT | Performed by: CLINICAL NURSE SPECIALIST

## 2023-11-01 PROCEDURE — 84075 ASSAY ALKALINE PHOSPHATASE: CPT

## 2023-11-01 PROCEDURE — 3078F DIAST BP <80 MM HG: CPT | Performed by: CLINICAL NURSE SPECIALIST

## 2023-11-01 PROCEDURE — 2500000004 HC RX 250 GENERAL PHARMACY W/ HCPCS (ALT 636 FOR OP/ED): Mod: JZ | Performed by: INTERNAL MEDICINE

## 2023-11-01 PROCEDURE — 36415 COLL VENOUS BLD VENIPUNCTURE: CPT

## 2023-11-01 PROCEDURE — 85025 COMPLETE CBC W/AUTO DIFF WBC: CPT

## 2023-11-01 PROCEDURE — 99214 OFFICE O/P EST MOD 30 MIN: CPT | Performed by: CLINICAL NURSE SPECIALIST

## 2023-11-01 PROCEDURE — 1159F MED LIST DOCD IN RCRD: CPT | Performed by: CLINICAL NURSE SPECIALIST

## 2023-11-01 PROCEDURE — 82533 TOTAL CORTISOL: CPT

## 2023-11-01 PROCEDURE — 82024 ASSAY OF ACTH: CPT

## 2023-11-01 PROCEDURE — 99214 OFFICE O/P EST MOD 30 MIN: CPT | Mod: 25 | Performed by: CLINICAL NURSE SPECIALIST

## 2023-11-01 PROCEDURE — 3074F SYST BP LT 130 MM HG: CPT | Performed by: CLINICAL NURSE SPECIALIST

## 2023-11-01 PROCEDURE — 1126F AMNT PAIN NOTED NONE PRSNT: CPT | Performed by: CLINICAL NURSE SPECIALIST

## 2023-11-01 RX ORDER — PROCHLORPERAZINE MALEATE 10 MG
10 TABLET ORAL EVERY 6 HOURS PRN
Status: DISCONTINUED | OUTPATIENT
Start: 2023-11-01 | End: 2023-11-01 | Stop reason: HOSPADM

## 2023-11-01 RX ORDER — PROCHLORPERAZINE EDISYLATE 5 MG/ML
10 INJECTION INTRAMUSCULAR; INTRAVENOUS EVERY 6 HOURS PRN
Status: DISCONTINUED | OUTPATIENT
Start: 2023-11-01 | End: 2023-11-01 | Stop reason: HOSPADM

## 2023-11-01 RX ORDER — ALBUTEROL SULFATE 0.83 MG/ML
3 SOLUTION RESPIRATORY (INHALATION) AS NEEDED
Status: DISCONTINUED | OUTPATIENT
Start: 2023-11-01 | End: 2023-11-01 | Stop reason: HOSPADM

## 2023-11-01 RX ORDER — FAMOTIDINE 10 MG/ML
20 INJECTION INTRAVENOUS ONCE AS NEEDED
Status: DISCONTINUED | OUTPATIENT
Start: 2023-11-01 | End: 2023-11-01 | Stop reason: HOSPADM

## 2023-11-01 RX ORDER — EPINEPHRINE 0.3 MG/.3ML
0.3 INJECTION SUBCUTANEOUS EVERY 5 MIN PRN
Status: DISCONTINUED | OUTPATIENT
Start: 2023-11-01 | End: 2023-11-01 | Stop reason: HOSPADM

## 2023-11-01 RX ORDER — DIPHENHYDRAMINE HYDROCHLORIDE 50 MG/ML
50 INJECTION INTRAMUSCULAR; INTRAVENOUS AS NEEDED
Status: DISCONTINUED | OUTPATIENT
Start: 2023-11-01 | End: 2023-11-01 | Stop reason: HOSPADM

## 2023-11-01 RX ADMIN — SODIUM CHLORIDE 240 MG: 9 INJECTION, SOLUTION INTRAVENOUS at 10:53

## 2023-11-01 ASSESSMENT — ENCOUNTER SYMPTOMS
LOSS OF SENSATION IN FEET: 1
DEPRESSION: 0
OCCASIONAL FEELINGS OF UNSTEADINESS: 0

## 2023-11-01 ASSESSMENT — PAIN SCALES - GENERAL: PAINLEVEL: 0-NO PAIN

## 2023-11-01 NOTE — SIGNIFICANT EVENT
11/01/23 1006   Prechemo Checklist   Has the patient been in the hospital, ED, or urgent care since last date of service No   Chemo/Immuno Consent Signed Yes   Protocol/Indications Verified Yes   Confirmed to previous date/time of medication Yes   Compared to previous dose Yes   All medications are dated accurately Yes   Pregnancy Test Negative Not applicable   Parameters Met Yes   BSA/Weight-Height Verified Yes   Dose Calculations Verified Yes

## 2023-11-03 LAB — ACTH PLAS-MCNC: 4.6 PG/ML (ref 7.2–63.3)

## 2023-11-04 NOTE — PROGRESS NOTES
Patient ID: Ehsan Dias is a 78 y.o. male.    DIAGNOSIS     Malignant right pleural mesothelioma- epithelioid type.  Date of diagnosis: 4/2/2021 from VATS guided pleural mass biopsy the neoplasm shows solid and papillary growth patterns. IHC of  tumor  cells were POSITIVE for calretinin, WT-1, CK5/6 and D2-40 (weak, focal), and NEGATIVE for CEAm, TTF-1, B72.3 and MOC31,        STAGING     Initial T2N0M0  Progression in chest and peritoneum in January 2022        CURRENT SITES OF DISEASE     pleura, mediastinum, peritoneum        MOLECULAR GENOMICS     MUTYH 48.9%  BAP1 17.5%   Tumor Mutational [employer] (TMB): 5.8 m/MB Microsatellite Instability (MSI) Status: Stable           SERUM TUMOR MARKER     Not applicable        PRIOR THERAPY     1- Pleurectomy May 7th , 2021        CURRENT THERAPY     02.23.2022 : Started on Ipilimumab and Nivolumab. Documented clinical and radiographic response.  Ipilimumab dropped and nivolumab alone was continued starting December 21, 2022 given toxicities of the combination.        CURRENT ONCOLOGICAL PROBLEMS     1/ R sided pleuritic chest pain , Improving  2. weight loss- in history it seems like this is intentional (need additional data points in future follow ups) - Gaining weight post surgery  3. dry cough - Significantly improved  4- Polypoid lesion seen on CT scan of the chest within the distal esophagus, referred for endoscopy.,  September 2021  5-  ascites starting Jan 2022.  Paracentesis performed in February suggest malignant ascites from mesothelioma.  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  Seen by rheumatology August 2022,  prescribed Celebrex. 1/18/23 - Prednisone taper 10 mg to current 7.5 mg.  Down to prednisone 5 mg with initiation of methotrexate  in July 2023.  Patient notified me on August 23, 2023 that he is not taking his methotrexate and he is fearful of the side effects.  Continues to have polyarthritis symptoms.  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 He was seen by Dr. Catie Harvey in Pulmonary clinic, routine imaging showed right side pleural effusion.  Diagnostic thoracentesis was performed on 7/13 that showed exudative effusion with neutrophilic predominance, cultures negative, cytology negative for carcinoma.   He completed course oral antibiotics and his pain and effusion resolved.  Again, had recurrence of pleuritic chest pain  and pleural effusion   later in 12/2020. The effusion was found to be worse on repeat chest imaging ( CT Chest in 2/2021). Diagnostic and therapeutic thoracentesis was done which showed exudative effusion with lymphocyte predominance. Given his recurrent ipsilateral moderate  size pleural effusion and near complete collapse of the right lower lobe and mild right middle lobe atelectasis on CT chest- he was referred to CT surgeon and a PET CT was also obtained.      3/2021: PET CT revealed right-sided pleural effusion with multiple hypermetabolic  areas of pleural nodularity visualized along the pleural surface of the right lung, which predominantly involve the right lung base. In addition, there were multiple FDG avid mediastinal lymph nodes including enlarged subcarinal lymph nodes (maximum SUV  of 4.4), paratracheal lymph nodes (maximum SUV of 3.1),  "and right hilar  lymph nodes (maximum SUV of 2.9).     3/23/21: was seen by Dr. Dannielle Junior from CT surgery and was scheduled to get R VATS pleural biopsy on 4/2/21: thorascopic exploration showed some adhesions at the diaphragm to the chest wall and also  to the lung.        PAST MEDICAL HISTORY     CAD with MI s/p PCI to RCA with 2 stents,   OA R shoulder s/p replacement,   HTN   elevated PSA with FDG avidity in post lobe of prostate- followed by Dr. 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   former smoker, quit 1969, 2 packs x 8 year  = 16 pack years   smoked marijuana since 1970s; quit few months ago  Possible exposure to asbestos when worked in the [employer].          CURRENT MEDS     see med list         ALLERGIES     nkda         FAMILY HISTORY     no family h/o mesothelioma        Subjective    Today I am meeting with Mr. Murray prior to treatment.  He continues to have some arthralgias, primarily in his hands- he has some scheduled follow up with his rheumatologist.  The pain is inconsistent, but seems worse at night, and a couple of nights ago he needed to run hot water over his hands.  Otherwise he is doing well, has gained weight and breathing is good.      Objective    BSA: 2.14 meters squared  /61   Pulse 89   Temp 36.2 °C (97.2 °F) (Skin)   Resp 17   Ht (S) 1.765 m (5' 9.49\")   Wt 93.7 kg (206 lb 9.6 oz)   SpO2 98%   BMI 30.08 kg/m²      Physical Exam  Vitals reviewed.   Constitutional:       Appearance: Normal appearance. He is normal weight.   HENT:      Head: Normocephalic.      Nose: Nose normal.      Mouth/Throat:      Mouth: Mucous membranes are moist.      Pharynx: Oropharynx is clear.   Eyes:      Conjunctiva/sclera: Conjunctivae normal.      Pupils: Pupils are equal, round, and reactive to light.   Cardiovascular:      Rate and Rhythm: Normal rate and regular " rhythm.      Pulses: Normal pulses.      Heart sounds: Normal heart sounds.   Pulmonary:      Effort: Pulmonary effort is normal.      Comments: Some pleural rub right base  Abdominal:      General: Abdomen is flat. Bowel sounds are normal.      Palpations: Abdomen is soft.   Musculoskeletal:         General: Normal range of motion.      Cervical back: Normal range of motion.   Skin:     General: Skin is warm.   Neurological:      General: No focal deficit present.      Mental Status: He is alert and oriented to person, place, and time.   Psychiatric:         Mood and Affect: Mood normal.         Behavior: Behavior normal.         Performance Status:  Symptomatic; fully ambulatory     Latest Reference Range & Units 10/09/23 09:14   HDL CHOLESTEROL mg/dL 38.6   Cholesterol/HDL Ratio  3.5   LDL Calculated 140 - 190 mg/dL 63 (L)   VLDL 0 - 40 mg/dL 34   TRIGLYCERIDES 0 - 149 mg/dL 169 (H)   Non HDL Cholesterol 0 - 149 mg/dL 96   CHOLESTEROL 0 - 199 mg/dL 135   (L): Data is abnormally low  (H): Data is abnormally high    CT 10/13/2023  IMPRESSION:  Malignant mesothelioma, restaging scan.  1. Stable postoperative changes of right pleurectomy without evidence  of recurrent disease. Lymph nodes are stable. No evidence of new  disease in the chest, abdomen, or pelvis.  2. Stable chronic findings as detailed above.          Assessment/Plan     This is a 78-year-old gentleman with malignant epithelioid right pleural mesothelioma diagnosed nearly 2-1/2 years ago.  He is clinically doing well on checkpoint immunotherapy.  He is at nearly 20 months into his treatment with plans to treat through early February 2024. His most recent CT scan showed no evidence of disease recurrence.  He has had a complete response to treatment.  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  He is considering off on pursuing methotrexate treatment.  Uses Tylenol for myalgias  pain.  He also has immunotherapy related vitiligo and immune related hypothyroidism.  TSH is 2.44 today.     Cancer Staging   No matching staging information was found for the patient.    Oncology History   Malignant neoplasm of mesothelial tissue (CMS/HCC)   2/27/2023 Initial Diagnosis    Malignant neoplasm of mesothelial tissue (CMS/HCC)     10/4/2023 -  Chemotherapy    Nivolumab 240 mg (Biweekly), 28 Day Cycles                   ZEN Bonilla-CNS

## 2023-11-14 ENCOUNTER — TELEPHONE (OUTPATIENT)
Dept: HEMATOLOGY/ONCOLOGY | Facility: HOSPITAL | Age: 78
End: 2023-11-14
Payer: MEDICARE

## 2023-11-14 NOTE — TELEPHONE ENCOUNTER
Patient would like to know why he is seeing the doctor tomorrow he just saw Gregoria 2 weeks ago.    I called and spoke with patient - informed him that he does not need to see Dr Blum tomorrow 11/15/23 and he can go straight to the infusion center for his Nivo treatment. Patient verbalized understanding.

## 2023-11-15 ENCOUNTER — INFUSION (OUTPATIENT)
Dept: HEMATOLOGY/ONCOLOGY | Facility: HOSPITAL | Age: 78
End: 2023-11-15
Payer: MEDICARE

## 2023-11-15 ENCOUNTER — APPOINTMENT (OUTPATIENT)
Dept: HEMATOLOGY/ONCOLOGY | Facility: HOSPITAL | Age: 78
End: 2023-11-15
Payer: MEDICARE

## 2023-11-15 VITALS
TEMPERATURE: 97.7 F | SYSTOLIC BLOOD PRESSURE: 127 MMHG | HEART RATE: 78 BPM | WEIGHT: 205.91 LBS | DIASTOLIC BLOOD PRESSURE: 73 MMHG | OXYGEN SATURATION: 98 % | RESPIRATION RATE: 16 BRPM | BODY MASS INDEX: 29.98 KG/M2

## 2023-11-15 DIAGNOSIS — C45.9 MALIGNANT NEOPLASM OF MESOTHELIAL TISSUE (MULTI): ICD-10-CM

## 2023-11-15 LAB
ALBUMIN SERPL BCP-MCNC: 4.3 G/DL (ref 3.4–5)
ALP SERPL-CCNC: 79 U/L (ref 33–136)
ALT SERPL W P-5'-P-CCNC: 48 U/L (ref 10–52)
ANION GAP SERPL CALC-SCNC: 11 MMOL/L (ref 10–20)
AST SERPL W P-5'-P-CCNC: 30 U/L (ref 9–39)
BASOPHILS # BLD AUTO: 0.04 X10*3/UL (ref 0–0.1)
BASOPHILS NFR BLD AUTO: 0.6 %
BILIRUB SERPL-MCNC: 0.7 MG/DL (ref 0–1.2)
BUN SERPL-MCNC: 25 MG/DL (ref 6–23)
CALCIUM SERPL-MCNC: 9.3 MG/DL (ref 8.6–10.3)
CHLORIDE SERPL-SCNC: 104 MMOL/L (ref 98–107)
CO2 SERPL-SCNC: 28 MMOL/L (ref 21–32)
CREAT SERPL-MCNC: 0.8 MG/DL (ref 0.5–1.3)
EOSINOPHIL # BLD AUTO: 0.09 X10*3/UL (ref 0–0.4)
EOSINOPHIL NFR BLD AUTO: 1.2 %
ERYTHROCYTE [DISTWIDTH] IN BLOOD BY AUTOMATED COUNT: 14.6 % (ref 11.5–14.5)
GFR SERPL CREATININE-BSD FRML MDRD: 90 ML/MIN/1.73M*2
GLUCOSE SERPL-MCNC: 83 MG/DL (ref 74–99)
HCT VFR BLD AUTO: 42.6 % (ref 41–52)
HGB BLD-MCNC: 14.1 G/DL (ref 13.5–17.5)
IMM GRANULOCYTES # BLD AUTO: 0.02 X10*3/UL (ref 0–0.5)
IMM GRANULOCYTES NFR BLD AUTO: 0.3 % (ref 0–0.9)
LYMPHOCYTES # BLD AUTO: 1.25 X10*3/UL (ref 0.8–3)
LYMPHOCYTES NFR BLD AUTO: 17.3 %
MCH RBC QN AUTO: 31 PG (ref 26–34)
MCHC RBC AUTO-ENTMCNC: 33.1 G/DL (ref 32–36)
MCV RBC AUTO: 94 FL (ref 80–100)
MONOCYTES # BLD AUTO: 0.82 X10*3/UL (ref 0.05–0.8)
MONOCYTES NFR BLD AUTO: 11.3 %
NEUTROPHILS # BLD AUTO: 5.02 X10*3/UL (ref 1.6–5.5)
NEUTROPHILS NFR BLD AUTO: 69.3 %
NRBC BLD-RTO: 0 /100 WBCS (ref 0–0)
PLATELET # BLD AUTO: 287 X10*3/UL (ref 150–450)
POTASSIUM SERPL-SCNC: 4.1 MMOL/L (ref 3.5–5.3)
PROT SERPL-MCNC: 7 G/DL (ref 6.4–8.2)
RBC # BLD AUTO: 4.55 X10*6/UL (ref 4.5–5.9)
SODIUM SERPL-SCNC: 139 MMOL/L (ref 136–145)
WBC # BLD AUTO: 7.2 X10*3/UL (ref 4.4–11.3)

## 2023-11-15 PROCEDURE — 96413 CHEMO IV INFUSION 1 HR: CPT

## 2023-11-15 PROCEDURE — 85025 COMPLETE CBC W/AUTO DIFF WBC: CPT

## 2023-11-15 PROCEDURE — 2500000004 HC RX 250 GENERAL PHARMACY W/ HCPCS (ALT 636 FOR OP/ED): Mod: JZ | Performed by: INTERNAL MEDICINE

## 2023-11-15 PROCEDURE — 80053 COMPREHEN METABOLIC PANEL: CPT

## 2023-11-15 PROCEDURE — 36415 COLL VENOUS BLD VENIPUNCTURE: CPT

## 2023-11-15 RX ORDER — EPINEPHRINE 0.3 MG/.3ML
0.3 INJECTION SUBCUTANEOUS EVERY 5 MIN PRN
Status: DISCONTINUED | OUTPATIENT
Start: 2023-11-15 | End: 2023-11-15 | Stop reason: HOSPADM

## 2023-11-15 RX ORDER — FOLIC ACID 1 MG/1
1 TABLET ORAL DAILY
COMMUNITY
Start: 2023-10-19 | End: 2024-01-26

## 2023-11-15 RX ORDER — ALBUTEROL SULFATE 0.83 MG/ML
3 SOLUTION RESPIRATORY (INHALATION) AS NEEDED
Status: DISCONTINUED | OUTPATIENT
Start: 2023-11-15 | End: 2023-11-15 | Stop reason: HOSPADM

## 2023-11-15 RX ORDER — FAMOTIDINE 10 MG/ML
20 INJECTION INTRAVENOUS ONCE AS NEEDED
Status: DISCONTINUED | OUTPATIENT
Start: 2023-11-15 | End: 2023-11-15 | Stop reason: HOSPADM

## 2023-11-15 RX ORDER — PROCHLORPERAZINE MALEATE 10 MG
10 TABLET ORAL EVERY 6 HOURS PRN
Status: DISCONTINUED | OUTPATIENT
Start: 2023-11-15 | End: 2023-11-15 | Stop reason: HOSPADM

## 2023-11-15 RX ORDER — DIPHENHYDRAMINE HYDROCHLORIDE 50 MG/ML
50 INJECTION INTRAMUSCULAR; INTRAVENOUS AS NEEDED
Status: DISCONTINUED | OUTPATIENT
Start: 2023-11-15 | End: 2023-11-15 | Stop reason: HOSPADM

## 2023-11-15 RX ORDER — CELECOXIB 200 MG/1
CAPSULE ORAL
COMMUNITY
Start: 2022-08-25 | End: 2023-11-21 | Stop reason: ALTCHOICE

## 2023-11-15 RX ORDER — PROCHLORPERAZINE EDISYLATE 5 MG/ML
10 INJECTION INTRAMUSCULAR; INTRAVENOUS EVERY 6 HOURS PRN
Status: DISCONTINUED | OUTPATIENT
Start: 2023-11-15 | End: 2023-11-15 | Stop reason: HOSPADM

## 2023-11-15 RX ADMIN — SODIUM CHLORIDE 240 MG: 9 INJECTION, SOLUTION INTRAVENOUS at 10:32

## 2023-11-15 ASSESSMENT — PAIN SCALES - GENERAL: PAINLEVEL: 0-NO PAIN

## 2023-11-21 ENCOUNTER — OFFICE VISIT (OUTPATIENT)
Dept: RHEUMATOLOGY | Facility: CLINIC | Age: 78
End: 2023-11-21
Payer: MEDICARE

## 2023-11-21 VITALS
HEIGHT: 69 IN | DIASTOLIC BLOOD PRESSURE: 75 MMHG | HEART RATE: 85 BPM | BODY MASS INDEX: 30.36 KG/M2 | SYSTOLIC BLOOD PRESSURE: 131 MMHG | TEMPERATURE: 97.7 F | WEIGHT: 205 LBS

## 2023-11-21 DIAGNOSIS — M06.4 POLYARTHRITIS, INFLAMMATORY (MULTI): Primary | ICD-10-CM

## 2023-11-21 PROCEDURE — 99214 OFFICE O/P EST MOD 30 MIN: CPT | Mod: GC | Performed by: INTERNAL MEDICINE

## 2023-11-21 RX ORDER — METHOTREXATE 2.5 MG/1
15 TABLET ORAL
Qty: 102 TABLET | Refills: 0 | Status: SHIPPED | OUTPATIENT
Start: 2023-11-21 | End: 2024-01-30 | Stop reason: SDUPTHER

## 2023-11-21 ASSESSMENT — PAIN SCALES - GENERAL: PAINLEVEL: 4

## 2023-11-21 ASSESSMENT — ENCOUNTER SYMPTOMS
EYES NEGATIVE: 1
HEMATOLOGIC/LYMPHATIC NEGATIVE: 1
PSYCHIATRIC NEGATIVE: 1
ARTHRALGIAS: 1
CARDIOVASCULAR NEGATIVE: 1
ALLERGIC/IMMUNOLOGIC NEGATIVE: 1
GASTROINTESTINAL NEGATIVE: 1
CONSTITUTIONAL NEGATIVE: 1
NEUROLOGICAL NEGATIVE: 1

## 2023-11-21 NOTE — PROGRESS NOTES
Subjective   Patient ID: [id number]   Ehsan Dias is a 78 y.o. male who presents for Follow-up (Follow up).  HPI  Follow up for ICI induced arthritis     Current IS   Prednisone 10 mg daily 2/2023  previously 2.5 -10 mg 8/2022       Has not started methotrexate      RECALL  This is a 77-year-old male who presents to [facility]. Past medical history includes that of hypertension, hyperlipidemia, hypothyroidism, coronary artery disease and most importantly, mesothelioma. For mesothelioma, he follows with oncologist. He was diagnosed on 4/2021, about 1 year ago, he was started on combination of ipilimumab and nivolumab. He developed significant joint pain/inflammatory arthritis 6 months later. Thereafter, ipilimumab was discontinued. He was started on Plaquenil. At the same time, he developed a skin rash and it was not certain whether this was related to Plaquenil or ipilimumab. Irrespective, the Plaquenil was discontinued. He was treated with prednisone and reports that the joint symptoms are well with prednisone 10 mg a day. At his recent follow-up with his rheumatologist, Dr. Segura, it was advised that he consider reducing the dose of prednisone.   Today, patient mentions that he has been taking prednisone 7.5 mg a day and continues to have some discomfort on his MCPs as well as wrist joints. He reports protracted morning stiffness. He does not report much discomfort in other joints including feet, knees, ankles, shoulders or elbows. He does not report much discomfort on neck or back. He does have a history of right shoulder replacement. Patient questions whether it is reasonable to take prednisone 10 mg a day on a regular basis.      Interval Symptoms  - now on PD-1 inhibitor Nivolumab    - Did not start methotrexate yet. Afraid of side effects and effect on his cancer therapy  - Still has significant arthralgias and stiffness lasting > 60 mins on most days  Occasional swelling of the hand joints  Now  started noticing numbness and tingling over the thumb and index finger, mainly at night, wakes up because of pain, shaking his hands helps the numbness and tingling  Used Wrist splint but it does not help  Occasionally drops things     Review of Systems  Constitutional: Denies fever, chills   Eyes: Denies dry eyes, blurry vision, redness of the eyes, pain in the eyes   ENT: Denies dry mouth, loss of taste, sores in the mouth, or difficulty swallowing   Cardiovascular: Denies chest pain, palpitations   Respiratory: Denies shortness of breath   Gastrointestinal: Denies changes in bowl or bladder function, nausea, vomiting, heartburn   Integumentary: Denies photosensitivity, rash or lesions, Raynaud's   Neurological: Denies any numbness or tingling   Hematologic/Lymphatic: Denies bleeding, alopecia, Raynaud's phenomena   Review of Systems   Constitutional: Negative.    HENT: Negative.     Eyes: Negative.    Cardiovascular: Negative.    Gastrointestinal: Negative.    Genitourinary: Negative.    Musculoskeletal:  Positive for arthralgias.   Skin: Negative.    Allergic/Immunologic: Negative.    Neurological: Negative.    Hematological: Negative.    Psychiatric/Behavioral: Negative.         Objective   Physical Exam  Physical Exam  General: Cooperative, in NAD   Eyes: Conjunctiva clear, sclera white, anicteric   Nose: No external deformity, no mucosal crusting or signs of bleeding   Throat/Mouth: Moist mucous membranes, normal dentition, no ulcers or lesions   Lungs: No respiratory distress; lungs CTAB; no wheezes, rales, rhonchi, or stridor   Heart: Normal S1 and S2; regular rate and rhythm; no murmurs, rubs, or gallops  Skin: No rashes, ulcers, tophi, or nodules noted  MSK:   Spine: Normal posture, no point tenderness to palpation, normal ROM    Upper Extremities:   Hands: Tender 2+3rd MCPs bilaterally without clinically evident synovitis  Wrists:  Tender wrist - extensor aspect wo swelling  Tinnels +ve     Elbows: No  erythema, warmth, synovitis, or pain; normal ROM   Shoulders: No erythema, warmth, appreciable swelling, or pain; normal ROM   Lower Extremities:   Hips: No gross deformity, erythema, warmth, or pain; normal ROM   Knees: No erythema, warmth, swelling, or pain; normal ROM   Ankles: No erythema, warmth, synovitis, or pain; normal ROM  Feet: No gross deformity, erythema, or swelling; MTP squeeze negative bilaterally       Assessment/Plan   Diagnoses and all orders for this visit:  Polyarthritis, inflammatory (CMS/HCC)  ICI induced arthritis   Grade 2 iAER  Unable to taper prednisone below 7.5-10mg daily on prednisone monotherapy    Patient  counselled in detail about the benefits vs risks of starting DMARD therapy  To improve his symptoms and taper off prednisone therapy  Now starting to develop CTS bilaterally from possible wrist synovitis as well  Clinically no joint deformities    We mutually agreed to start methotrexate 6 tablets qweekly + folic acid  Patient informed to reach out to me if any concerns  Will taper prednisone to 7.5 mg daily after 3-4 weeks   With an eventual goal to get him to 5 mg by next follow up in 10-12 weeks    If still hesitant to take methotrexate, will try SSZ ( but not entirely sure whether it will be enough to lower prednisone and control his symptoms as it is a weaker immunomodulator)  Did not discuss the potential option of 2nd line Rx with TNFi on this visit but is a potential option as well if arthritis is not controlled with csDMARDs or unable to tolerate csDMARDs    Recently had labs, and getting frequent labs by oncology colleagues  Will get CRP/ESR next visit    -     Follow Up In Rheumatology; Future  -     methotrexate (Trexall) 2.5 mg tablet; Take 6 tablets (15 mg total) by mouth 1 (one) time per week.  Follow directions carefully, and ask to explain any part you do not understand. Take exactly as directed.      Case seen and discussed with Dr Obie Barton,  MD

## 2023-11-21 NOTE — PATIENT INSTRUCTIONS
- Please start methotrexate 6 tablets every week for the arthritis  - Folic acid once daily    - I will keep an eye on your blood tests  And will try to wean off prednisone in the coming weeks    - Please use the wrist splint at night   - I will see you in 8-10 weeks time    Please reach out to us if you cannot take methotrexate or have any concerning side effects from it

## 2023-11-29 ENCOUNTER — INFUSION (OUTPATIENT)
Dept: HEMATOLOGY/ONCOLOGY | Facility: HOSPITAL | Age: 78
End: 2023-11-29
Payer: MEDICARE

## 2023-11-29 ENCOUNTER — OFFICE VISIT (OUTPATIENT)
Dept: HEMATOLOGY/ONCOLOGY | Facility: HOSPITAL | Age: 78
End: 2023-11-29
Payer: MEDICARE

## 2023-11-29 VITALS
TEMPERATURE: 98.1 F | WEIGHT: 203.9 LBS | OXYGEN SATURATION: 97 % | RESPIRATION RATE: 17 BRPM | HEART RATE: 78 BPM | BODY MASS INDEX: 30.11 KG/M2 | DIASTOLIC BLOOD PRESSURE: 75 MMHG | SYSTOLIC BLOOD PRESSURE: 138 MMHG

## 2023-11-29 DIAGNOSIS — C45.9 MALIGNANT NEOPLASM OF MESOTHELIAL TISSUE (MULTI): ICD-10-CM

## 2023-11-29 LAB
ALBUMIN SERPL BCP-MCNC: 4.4 G/DL (ref 3.4–5)
ALP SERPL-CCNC: 82 U/L (ref 33–136)
ALT SERPL W P-5'-P-CCNC: 44 U/L (ref 10–52)
ANION GAP SERPL CALC-SCNC: 14 MMOL/L (ref 10–20)
AST SERPL W P-5'-P-CCNC: 27 U/L (ref 9–39)
BASOPHILS # BLD AUTO: 0.04 X10*3/UL (ref 0–0.1)
BASOPHILS NFR BLD AUTO: 0.5 %
BILIRUB SERPL-MCNC: 0.6 MG/DL (ref 0–1.2)
BUN SERPL-MCNC: 22 MG/DL (ref 6–23)
CALCIUM SERPL-MCNC: 9.7 MG/DL (ref 8.6–10.3)
CHLORIDE SERPL-SCNC: 102 MMOL/L (ref 98–107)
CO2 SERPL-SCNC: 27 MMOL/L (ref 21–32)
CREAT SERPL-MCNC: 0.82 MG/DL (ref 0.5–1.3)
EOSINOPHIL # BLD AUTO: 0.05 X10*3/UL (ref 0–0.4)
EOSINOPHIL NFR BLD AUTO: 0.6 %
ERYTHROCYTE [DISTWIDTH] IN BLOOD BY AUTOMATED COUNT: 14.5 % (ref 11.5–14.5)
GFR SERPL CREATININE-BSD FRML MDRD: 89 ML/MIN/1.73M*2
GLUCOSE SERPL-MCNC: 100 MG/DL (ref 74–99)
HCT VFR BLD AUTO: 44 % (ref 41–52)
HGB BLD-MCNC: 14.7 G/DL (ref 13.5–17.5)
IMM GRANULOCYTES # BLD AUTO: 0.05 X10*3/UL (ref 0–0.5)
IMM GRANULOCYTES NFR BLD AUTO: 0.6 % (ref 0–0.9)
LYMPHOCYTES # BLD AUTO: 1.26 X10*3/UL (ref 0.8–3)
LYMPHOCYTES NFR BLD AUTO: 16 %
MCH RBC QN AUTO: 31.1 PG (ref 26–34)
MCHC RBC AUTO-ENTMCNC: 33.4 G/DL (ref 32–36)
MCV RBC AUTO: 93 FL (ref 80–100)
MONOCYTES # BLD AUTO: 0.64 X10*3/UL (ref 0.05–0.8)
MONOCYTES NFR BLD AUTO: 8.1 %
NEUTROPHILS # BLD AUTO: 5.82 X10*3/UL (ref 1.6–5.5)
NEUTROPHILS NFR BLD AUTO: 74.2 %
NRBC BLD-RTO: 0 /100 WBCS (ref 0–0)
PLATELET # BLD AUTO: 292 X10*3/UL (ref 150–450)
POTASSIUM SERPL-SCNC: 4.3 MMOL/L (ref 3.5–5.3)
PROT SERPL-MCNC: 7.3 G/DL (ref 6.4–8.2)
RBC # BLD AUTO: 4.73 X10*6/UL (ref 4.5–5.9)
SODIUM SERPL-SCNC: 139 MMOL/L (ref 136–145)
TSH SERPL-ACNC: 1.36 MIU/L (ref 0.44–3.98)
WBC # BLD AUTO: 7.9 X10*3/UL (ref 4.4–11.3)

## 2023-11-29 PROCEDURE — 1126F AMNT PAIN NOTED NONE PRSNT: CPT | Performed by: CLINICAL NURSE SPECIALIST

## 2023-11-29 PROCEDURE — 99214 OFFICE O/P EST MOD 30 MIN: CPT | Performed by: CLINICAL NURSE SPECIALIST

## 2023-11-29 PROCEDURE — 80053 COMPREHEN METABOLIC PANEL: CPT

## 2023-11-29 PROCEDURE — 3075F SYST BP GE 130 - 139MM HG: CPT | Performed by: CLINICAL NURSE SPECIALIST

## 2023-11-29 PROCEDURE — 99214 OFFICE O/P EST MOD 30 MIN: CPT | Mod: 25 | Performed by: CLINICAL NURSE SPECIALIST

## 2023-11-29 PROCEDURE — 84443 ASSAY THYROID STIM HORMONE: CPT

## 2023-11-29 PROCEDURE — 85025 COMPLETE CBC W/AUTO DIFF WBC: CPT

## 2023-11-29 PROCEDURE — 1036F TOBACCO NON-USER: CPT | Performed by: CLINICAL NURSE SPECIALIST

## 2023-11-29 PROCEDURE — 36415 COLL VENOUS BLD VENIPUNCTURE: CPT

## 2023-11-29 PROCEDURE — 2500000004 HC RX 250 GENERAL PHARMACY W/ HCPCS (ALT 636 FOR OP/ED): Mod: JZ | Performed by: CLINICAL NURSE SPECIALIST

## 2023-11-29 PROCEDURE — 3078F DIAST BP <80 MM HG: CPT | Performed by: CLINICAL NURSE SPECIALIST

## 2023-11-29 PROCEDURE — 1160F RVW MEDS BY RX/DR IN RCRD: CPT | Performed by: CLINICAL NURSE SPECIALIST

## 2023-11-29 PROCEDURE — 96413 CHEMO IV INFUSION 1 HR: CPT

## 2023-11-29 PROCEDURE — 1159F MED LIST DOCD IN RCRD: CPT | Performed by: CLINICAL NURSE SPECIALIST

## 2023-11-29 RX ORDER — PROCHLORPERAZINE MALEATE 10 MG
10 TABLET ORAL EVERY 6 HOURS PRN
Status: CANCELLED | OUTPATIENT
Start: 2023-12-13

## 2023-11-29 RX ORDER — DIPHENHYDRAMINE HYDROCHLORIDE 50 MG/ML
50 INJECTION INTRAMUSCULAR; INTRAVENOUS AS NEEDED
Status: CANCELLED | OUTPATIENT
Start: 2023-12-13

## 2023-11-29 RX ORDER — DIPHENHYDRAMINE HYDROCHLORIDE 50 MG/ML
50 INJECTION INTRAMUSCULAR; INTRAVENOUS AS NEEDED
Status: CANCELLED | OUTPATIENT
Start: 2023-11-29

## 2023-11-29 RX ORDER — PROCHLORPERAZINE EDISYLATE 5 MG/ML
10 INJECTION INTRAMUSCULAR; INTRAVENOUS EVERY 6 HOURS PRN
Status: CANCELLED | OUTPATIENT
Start: 2023-11-29

## 2023-11-29 RX ORDER — EPINEPHRINE 0.3 MG/.3ML
0.3 INJECTION SUBCUTANEOUS EVERY 5 MIN PRN
Status: CANCELLED | OUTPATIENT
Start: 2023-11-29

## 2023-11-29 RX ORDER — PROCHLORPERAZINE MALEATE 10 MG
10 TABLET ORAL EVERY 6 HOURS PRN
Status: DISCONTINUED | OUTPATIENT
Start: 2023-11-29 | End: 2023-11-29 | Stop reason: HOSPADM

## 2023-11-29 RX ORDER — PROCHLORPERAZINE EDISYLATE 5 MG/ML
10 INJECTION INTRAMUSCULAR; INTRAVENOUS EVERY 6 HOURS PRN
Status: CANCELLED | OUTPATIENT
Start: 2023-12-13

## 2023-11-29 RX ORDER — EPINEPHRINE 0.3 MG/.3ML
0.3 INJECTION SUBCUTANEOUS EVERY 5 MIN PRN
Status: CANCELLED | OUTPATIENT
Start: 2023-12-13

## 2023-11-29 RX ORDER — FAMOTIDINE 10 MG/ML
20 INJECTION INTRAVENOUS ONCE AS NEEDED
Status: DISCONTINUED | OUTPATIENT
Start: 2023-11-29 | End: 2023-11-29 | Stop reason: HOSPADM

## 2023-11-29 RX ORDER — HEPARIN 100 UNIT/ML
500 SYRINGE INTRAVENOUS AS NEEDED
Status: CANCELLED | OUTPATIENT
Start: 2023-11-29

## 2023-11-29 RX ORDER — HEPARIN SODIUM,PORCINE/PF 10 UNIT/ML
50 SYRINGE (ML) INTRAVENOUS AS NEEDED
Status: CANCELLED | OUTPATIENT
Start: 2023-11-29

## 2023-11-29 RX ORDER — PROCHLORPERAZINE EDISYLATE 5 MG/ML
10 INJECTION INTRAMUSCULAR; INTRAVENOUS EVERY 6 HOURS PRN
Status: DISCONTINUED | OUTPATIENT
Start: 2023-11-29 | End: 2023-11-29 | Stop reason: HOSPADM

## 2023-11-29 RX ORDER — DIPHENHYDRAMINE HYDROCHLORIDE 50 MG/ML
50 INJECTION INTRAMUSCULAR; INTRAVENOUS AS NEEDED
Status: DISCONTINUED | OUTPATIENT
Start: 2023-11-29 | End: 2023-11-29 | Stop reason: HOSPADM

## 2023-11-29 RX ORDER — ALBUTEROL SULFATE 0.83 MG/ML
3 SOLUTION RESPIRATORY (INHALATION) AS NEEDED
Status: CANCELLED | OUTPATIENT
Start: 2023-11-29

## 2023-11-29 RX ORDER — EPINEPHRINE 0.3 MG/.3ML
0.3 INJECTION SUBCUTANEOUS EVERY 5 MIN PRN
Status: DISCONTINUED | OUTPATIENT
Start: 2023-11-29 | End: 2023-11-29 | Stop reason: HOSPADM

## 2023-11-29 RX ORDER — ALBUTEROL SULFATE 0.83 MG/ML
3 SOLUTION RESPIRATORY (INHALATION) AS NEEDED
Status: CANCELLED | OUTPATIENT
Start: 2023-12-13

## 2023-11-29 RX ORDER — FAMOTIDINE 10 MG/ML
20 INJECTION INTRAVENOUS ONCE AS NEEDED
Status: CANCELLED | OUTPATIENT
Start: 2023-12-13

## 2023-11-29 RX ORDER — PROCHLORPERAZINE MALEATE 10 MG
10 TABLET ORAL EVERY 6 HOURS PRN
Status: CANCELLED | OUTPATIENT
Start: 2023-11-29

## 2023-11-29 RX ORDER — ALBUTEROL SULFATE 0.83 MG/ML
3 SOLUTION RESPIRATORY (INHALATION) AS NEEDED
Status: DISCONTINUED | OUTPATIENT
Start: 2023-11-29 | End: 2023-11-29 | Stop reason: HOSPADM

## 2023-11-29 RX ORDER — FAMOTIDINE 10 MG/ML
20 INJECTION INTRAVENOUS ONCE AS NEEDED
Status: CANCELLED | OUTPATIENT
Start: 2023-11-29

## 2023-11-29 RX ADMIN — SODIUM CHLORIDE 240 MG: 9 INJECTION, SOLUTION INTRAVENOUS at 09:45

## 2023-11-29 ASSESSMENT — ENCOUNTER SYMPTOMS
DEPRESSION: 0
OCCASIONAL FEELINGS OF UNSTEADINESS: 0
LOSS OF SENSATION IN FEET: 1

## 2023-11-29 ASSESSMENT — PAIN SCALES - GENERAL: PAINLEVEL: 0-NO PAIN

## 2023-11-29 NOTE — PROGRESS NOTES
Patient ID: Ehsan Dias is a 78 y.o. male.    DIAGNOSIS     Malignant right pleural mesothelioma- epithelioid type.  Date of diagnosis: 4/2/2021 from VATS guided pleural mass biopsy the neoplasm shows solid and papillary growth patterns. IHC of  tumor  cells were POSITIVE for calretinin, WT-1, CK5/6 and D2-40 (weak, focal), and NEGATIVE for CEAm, TTF-1, B72.3 and MOC31,        STAGING     Initial T2N0M0  Progression in chest and peritoneum in January 2022        CURRENT SITES OF DISEASE     pleura, mediastinum, peritoneum        MOLECULAR GENOMICS     MUTYH 48.9%  BAP1 17.5%   Tumor Mutational [employer] (TMB): 5.8 m/MB Microsatellite Instability (MSI) Status: Stable           SERUM TUMOR MARKER     Not applicable        PRIOR THERAPY     1- Pleurectomy May 7th , 2021        CURRENT THERAPY     02.23.2022 : Started on Ipilimumab and Nivolumab. Documented clinical and radiographic response.  Ipilimumab dropped and nivolumab alone was continued starting December 21, 2022 given toxicities of the combination.        CURRENT ONCOLOGICAL PROBLEMS     1/ R sided pleuritic chest pain , Improving  2. weight loss- in history it seems like this is intentional (need additional data points in future follow ups) - Gaining weight post surgery  3. dry cough - Significantly improved  4- Polypoid lesion seen on CT scan of the chest within the distal esophagus, referred for endoscopy.,  September 2021  5-  ascites starting Jan 2022.  Paracentesis performed in February suggest malignant ascites from mesothelioma.  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  Seen by rheumatology August 2022,  prescribed Celebrex. 1/18/23 - Prednisone taper 10 mg to current 7.5 mg.  Down to prednisone 5 mg with initiation of methotrexate  in November 2023.  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 He was seen by Dr. Catie Harvey in Pulmonary clinic, routine imaging showed right side pleural effusion.  Diagnostic thoracentesis was performed on 7/13 that showed exudative effusion with neutrophilic predominance, cultures negative, cytology negative for carcinoma.   He completed course oral antibiotics and his pain and effusion resolved.  Again, had recurrence of pleuritic chest pain  and pleural effusion   later in 12/2020. The effusion was found to be worse on repeat chest imaging ( CT Chest in 2/2021). Diagnostic and therapeutic thoracentesis was done which showed exudative effusion with lymphocyte predominance. Given his recurrent ipsilateral moderate  size pleural effusion and near complete collapse of the right lower lobe and mild right middle lobe atelectasis on CT chest- he was referred to CT surgeon and a PET CT was also obtained.      3/2021: PET CT revealed right-sided pleural effusion with multiple hypermetabolic  areas of pleural nodularity visualized along the pleural surface of the right lung, which predominantly involve the right lung base. In addition, there were multiple FDG avid mediastinal lymph nodes including enlarged subcarinal lymph nodes (maximum SUV  of 4.4), paratracheal lymph nodes (maximum SUV of 3.1), and right hilar  lymph nodes (maximum SUV of 2.9).     3/23/21: was seen by Dr. Dannielle Junior from CT surgery and was scheduled to get R VATS pleural  biopsy on 4/2/21: thorascopic exploration showed some adhesions at the diaphragm to the chest wall and also  to the lung.        PAST MEDICAL HISTORY     CAD with MI s/p PCI to RCA with 2 stents,   OA R shoulder s/p replacement,   HTN   elevated PSA with FDG avidity in post lobe of prostate- followed by Dr. 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   former smoker, quit 1969, 2 packs x 8 year  = 16 pack years   smoked marijuana since 1970s; quit few months ago  Possible exposure to asbestos when worked in the [employer].          CURRENT MEDS     see med list         ALLERGIES     nkda         FAMILY HISTORY     no family h/o mesothelioma        Subjective    Today I am meeting with Mr. Murray prior to treatment.  He has seen his rheumatologist and started Methotrexate  and already notes some improvement after one dose.  He has gained a lot of weight, but admits to not being very compliant with his synthroid.  He has been good about taking it the past 6 days.      Objective    BSA: 2.12 meters squared  Wt 92.5 kg (203 lb 14.4 oz)   BMI 30.11 kg/m²      Physical Exam  Vitals reviewed.   Constitutional:       Appearance: Normal appearance. He is normal weight.   HENT:      Head: Normocephalic.      Nose: Nose normal.      Mouth/Throat:      Mouth: Mucous membranes are moist.      Pharynx: Oropharynx is clear.   Eyes:      Conjunctiva/sclera: Conjunctivae normal.      Pupils: Pupils are equal, round, and reactive to light.   Cardiovascular:      Rate and Rhythm: Normal rate and regular rhythm.      Pulses: Normal pulses.      Heart sounds: Normal heart sounds.   Pulmonary:      Effort: Pulmonary effort is normal.      Comments: Some pleural rub right base  Abdominal:      General: Abdomen is flat. Bowel sounds are normal.      Palpations: Abdomen is soft.   Musculoskeletal:         General: Normal range of  motion.      Cervical back: Normal range of motion.   Skin:     General: Skin is warm.   Neurological:      General: No focal deficit present.      Mental Status: He is alert and oriented to person, place, and time.   Psychiatric:         Mood and Affect: Mood normal.         Behavior: Behavior normal.       Performance Status:  Symptomatic; fully ambulatory     Latest Reference Range & Units 10/09/23 09:14   HDL CHOLESTEROL mg/dL 38.6   Cholesterol/HDL Ratio  3.5   LDL Calculated 140 - 190 mg/dL 63 (L)   VLDL 0 - 40 mg/dL 34   TRIGLYCERIDES 0 - 149 mg/dL 169 (H)   Non HDL Cholesterol 0 - 149 mg/dL 96   CHOLESTEROL 0 - 199 mg/dL 135   (L): Data is abnormally low  (H): Data is abnormally high    CT 10/13/2023  IMPRESSION:  Malignant mesothelioma, restaging scan.  1. Stable postoperative changes of right pleurectomy without evidence  of recurrent disease. Lymph nodes are stable. No evidence of new  disease in the chest, abdomen, or pelvis.  2. Stable chronic findings as detailed above.          Assessment/Plan     This is a 78-year-old gentleman with malignant epithelioid right pleural mesothelioma diagnosed nearly 2-1/2 years ago.  He is clinically doing well on checkpoint immunotherapy.  He is at nearly 20 months into his treatment with plans to treat through early February 2024. His most recent CT scan showed no evidence of disease recurrence.  He has had a complete response to treatment.  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 He saw the rheumatologist and started Methotrexate last week.  He has already noticed a difference feels pain is 20-30% better.  He is hypothyroid as well and confesses he was not as compliant- and is is trying to take it religiously now.   Uses Tylenol for myalgias pain.  He also has immunotherapy related vitiligo.  That ias stable- has not spread.  He continues on prednisone BID.       Plan:  Continue immune  therapy q 2 weeks.  We will see him back in 1 month for treatment again, with a plan for a scan in January, prior to his completion of 2 years of immune therapy.       Cancer Staging   No matching staging information was found for the patient.    Oncology History   Malignant neoplasm of mesothelial tissue (CMS/HCC)   2/27/2023 Initial Diagnosis    Malignant neoplasm of mesothelial tissue (CMS/HCC)     10/4/2023 -  Chemotherapy    Nivolumab 240 mg (Biweekly), 28 Day Cycles                   ZEN Bonilla-CNS

## 2023-12-13 ENCOUNTER — INFUSION (OUTPATIENT)
Dept: HEMATOLOGY/ONCOLOGY | Facility: HOSPITAL | Age: 78
End: 2023-12-13
Payer: MEDICARE

## 2023-12-13 VITALS
TEMPERATURE: 97.7 F | RESPIRATION RATE: 16 BRPM | HEART RATE: 76 BPM | SYSTOLIC BLOOD PRESSURE: 137 MMHG | BODY MASS INDEX: 30.17 KG/M2 | WEIGHT: 204.3 LBS | OXYGEN SATURATION: 98 % | DIASTOLIC BLOOD PRESSURE: 66 MMHG

## 2023-12-13 DIAGNOSIS — C45.9 MALIGNANT NEOPLASM OF MESOTHELIAL TISSUE (MULTI): Primary | ICD-10-CM

## 2023-12-13 DIAGNOSIS — C45.9 MALIGNANT NEOPLASM OF MESOTHELIAL TISSUE (MULTI): ICD-10-CM

## 2023-12-13 LAB
ALBUMIN SERPL BCP-MCNC: 4 G/DL (ref 3.4–5)
ALP SERPL-CCNC: 80 U/L (ref 33–136)
ALT SERPL W P-5'-P-CCNC: 41 U/L (ref 10–52)
ANION GAP SERPL CALC-SCNC: 13 MMOL/L (ref 10–20)
AST SERPL W P-5'-P-CCNC: 26 U/L (ref 9–39)
BASOPHILS # BLD AUTO: 0.05 X10*3/UL (ref 0–0.1)
BASOPHILS NFR BLD AUTO: 0.6 %
BILIRUB SERPL-MCNC: 0.5 MG/DL (ref 0–1.2)
BUN SERPL-MCNC: 22 MG/DL (ref 6–23)
CALCIUM SERPL-MCNC: 9.3 MG/DL (ref 8.6–10.3)
CHLORIDE SERPL-SCNC: 106 MMOL/L (ref 98–107)
CO2 SERPL-SCNC: 27 MMOL/L (ref 21–32)
CREAT SERPL-MCNC: 0.8 MG/DL (ref 0.5–1.3)
EOSINOPHIL # BLD AUTO: 0.07 X10*3/UL (ref 0–0.4)
EOSINOPHIL NFR BLD AUTO: 0.8 %
ERYTHROCYTE [DISTWIDTH] IN BLOOD BY AUTOMATED COUNT: 14.8 % (ref 11.5–14.5)
GFR SERPL CREATININE-BSD FRML MDRD: 90 ML/MIN/1.73M*2
GLUCOSE SERPL-MCNC: 90 MG/DL (ref 74–99)
HCT VFR BLD AUTO: 43.5 % (ref 41–52)
HGB BLD-MCNC: 14.2 G/DL (ref 13.5–17.5)
IMM GRANULOCYTES # BLD AUTO: 0.03 X10*3/UL (ref 0–0.5)
IMM GRANULOCYTES NFR BLD AUTO: 0.3 % (ref 0–0.9)
LYMPHOCYTES # BLD AUTO: 1.24 X10*3/UL (ref 0.8–3)
LYMPHOCYTES NFR BLD AUTO: 13.8 %
MCH RBC QN AUTO: 31.6 PG (ref 26–34)
MCHC RBC AUTO-ENTMCNC: 32.6 G/DL (ref 32–36)
MCV RBC AUTO: 97 FL (ref 80–100)
MONOCYTES # BLD AUTO: 0.78 X10*3/UL (ref 0.05–0.8)
MONOCYTES NFR BLD AUTO: 8.7 %
NEUTROPHILS # BLD AUTO: 6.83 X10*3/UL (ref 1.6–5.5)
NEUTROPHILS NFR BLD AUTO: 75.8 %
NRBC BLD-RTO: 0 /100 WBCS (ref 0–0)
PLATELET # BLD AUTO: 260 X10*3/UL (ref 150–450)
POTASSIUM SERPL-SCNC: 4.4 MMOL/L (ref 3.5–5.3)
PROT SERPL-MCNC: 6.8 G/DL (ref 6.4–8.2)
RBC # BLD AUTO: 4.5 X10*6/UL (ref 4.5–5.9)
SODIUM SERPL-SCNC: 142 MMOL/L (ref 136–145)
WBC # BLD AUTO: 9 X10*3/UL (ref 4.4–11.3)

## 2023-12-13 PROCEDURE — 36415 COLL VENOUS BLD VENIPUNCTURE: CPT

## 2023-12-13 PROCEDURE — 80053 COMPREHEN METABOLIC PANEL: CPT

## 2023-12-13 PROCEDURE — 96413 CHEMO IV INFUSION 1 HR: CPT

## 2023-12-13 PROCEDURE — 2500000004 HC RX 250 GENERAL PHARMACY W/ HCPCS (ALT 636 FOR OP/ED): Mod: JZ | Performed by: CLINICAL NURSE SPECIALIST

## 2023-12-13 PROCEDURE — 85025 COMPLETE CBC W/AUTO DIFF WBC: CPT

## 2023-12-13 RX ORDER — PROCHLORPERAZINE MALEATE 10 MG
10 TABLET ORAL EVERY 6 HOURS PRN
Status: DISCONTINUED | OUTPATIENT
Start: 2023-12-13 | End: 2023-12-13 | Stop reason: HOSPADM

## 2023-12-13 RX ORDER — EPINEPHRINE 0.3 MG/.3ML
0.3 INJECTION SUBCUTANEOUS EVERY 5 MIN PRN
Status: CANCELLED | OUTPATIENT
Start: 2023-12-27

## 2023-12-13 RX ORDER — FAMOTIDINE 10 MG/ML
20 INJECTION INTRAVENOUS ONCE AS NEEDED
Status: CANCELLED | OUTPATIENT
Start: 2023-12-27

## 2023-12-13 RX ORDER — ALBUTEROL SULFATE 0.83 MG/ML
3 SOLUTION RESPIRATORY (INHALATION) AS NEEDED
Status: CANCELLED | OUTPATIENT
Start: 2023-12-27

## 2023-12-13 RX ORDER — ALBUTEROL SULFATE 0.83 MG/ML
3 SOLUTION RESPIRATORY (INHALATION) AS NEEDED
Status: DISCONTINUED | OUTPATIENT
Start: 2023-12-13 | End: 2023-12-13 | Stop reason: HOSPADM

## 2023-12-13 RX ORDER — HEPARIN 100 UNIT/ML
500 SYRINGE INTRAVENOUS AS NEEDED
Status: CANCELLED | OUTPATIENT
Start: 2023-12-13

## 2023-12-13 RX ORDER — DIPHENHYDRAMINE HYDROCHLORIDE 50 MG/ML
50 INJECTION INTRAMUSCULAR; INTRAVENOUS AS NEEDED
Status: CANCELLED | OUTPATIENT
Start: 2023-12-27

## 2023-12-13 RX ORDER — EPINEPHRINE 0.3 MG/.3ML
0.3 INJECTION SUBCUTANEOUS EVERY 5 MIN PRN
Status: DISCONTINUED | OUTPATIENT
Start: 2023-12-13 | End: 2023-12-13 | Stop reason: HOSPADM

## 2023-12-13 RX ORDER — PROCHLORPERAZINE MALEATE 10 MG
10 TABLET ORAL EVERY 6 HOURS PRN
Status: CANCELLED | OUTPATIENT
Start: 2023-12-27

## 2023-12-13 RX ORDER — FAMOTIDINE 10 MG/ML
20 INJECTION INTRAVENOUS ONCE AS NEEDED
Status: DISCONTINUED | OUTPATIENT
Start: 2023-12-13 | End: 2023-12-13 | Stop reason: HOSPADM

## 2023-12-13 RX ORDER — PROCHLORPERAZINE EDISYLATE 5 MG/ML
10 INJECTION INTRAMUSCULAR; INTRAVENOUS EVERY 6 HOURS PRN
Status: CANCELLED | OUTPATIENT
Start: 2023-12-27

## 2023-12-13 RX ORDER — HEPARIN SODIUM,PORCINE/PF 10 UNIT/ML
50 SYRINGE (ML) INTRAVENOUS AS NEEDED
Status: CANCELLED | OUTPATIENT
Start: 2023-12-13

## 2023-12-13 RX ORDER — PROCHLORPERAZINE EDISYLATE 5 MG/ML
10 INJECTION INTRAMUSCULAR; INTRAVENOUS EVERY 6 HOURS PRN
Status: DISCONTINUED | OUTPATIENT
Start: 2023-12-13 | End: 2023-12-13 | Stop reason: HOSPADM

## 2023-12-13 RX ORDER — DIPHENHYDRAMINE HYDROCHLORIDE 50 MG/ML
50 INJECTION INTRAMUSCULAR; INTRAVENOUS AS NEEDED
Status: DISCONTINUED | OUTPATIENT
Start: 2023-12-13 | End: 2023-12-13 | Stop reason: HOSPADM

## 2023-12-13 RX ADMIN — SODIUM CHLORIDE 240 MG: 9 INJECTION, SOLUTION INTRAVENOUS at 10:01

## 2023-12-13 ASSESSMENT — PAIN SCALES - GENERAL: PAINLEVEL: 0-NO PAIN

## 2023-12-13 NOTE — SIGNIFICANT EVENT
12/13/23 0924   Prechemo Checklist   Has the patient been in the hospital, ED, or urgent care since last date of service No   Chemo/Immuno Consent Signed Yes   Protocol/Indications Verified Yes   Confirmed to previous date/time of medication Yes   Compared to previous dose Yes   All medications are dated accurately Yes   Pregnancy Test Negative Not applicable   Parameters Met Yes   BSA/Weight-Height Verified Yes   Dose Calculations Verified Yes

## 2023-12-27 ENCOUNTER — OFFICE VISIT (OUTPATIENT)
Dept: HEMATOLOGY/ONCOLOGY | Facility: HOSPITAL | Age: 78
End: 2023-12-27
Payer: MEDICARE

## 2023-12-27 ENCOUNTER — INFUSION (OUTPATIENT)
Dept: HEMATOLOGY/ONCOLOGY | Facility: HOSPITAL | Age: 78
End: 2023-12-27
Payer: MEDICARE

## 2023-12-27 VITALS
BODY MASS INDEX: 29.83 KG/M2 | SYSTOLIC BLOOD PRESSURE: 133 MMHG | HEIGHT: 69 IN | WEIGHT: 201.4 LBS | TEMPERATURE: 97.3 F | RESPIRATION RATE: 16 BRPM | DIASTOLIC BLOOD PRESSURE: 69 MMHG | HEART RATE: 82 BPM | OXYGEN SATURATION: 96 %

## 2023-12-27 DIAGNOSIS — C45.9 MALIGNANT NEOPLASM OF MESOTHELIAL TISSUE (MULTI): Primary | ICD-10-CM

## 2023-12-27 DIAGNOSIS — C45.9 MALIGNANT NEOPLASM OF MESOTHELIAL TISSUE (MULTI): ICD-10-CM

## 2023-12-27 LAB
ALBUMIN SERPL BCP-MCNC: 4.1 G/DL (ref 3.4–5)
ALP SERPL-CCNC: 74 U/L (ref 33–136)
ALT SERPL W P-5'-P-CCNC: 31 U/L (ref 10–52)
ANION GAP SERPL CALC-SCNC: 12 MMOL/L (ref 10–20)
AST SERPL W P-5'-P-CCNC: 23 U/L (ref 9–39)
BASOPHILS # BLD AUTO: 0.04 X10*3/UL (ref 0–0.1)
BASOPHILS NFR BLD AUTO: 0.5 %
BILIRUB SERPL-MCNC: 0.5 MG/DL (ref 0–1.2)
BUN SERPL-MCNC: 21 MG/DL (ref 6–23)
CALCIUM SERPL-MCNC: 9.2 MG/DL (ref 8.6–10.3)
CHLORIDE SERPL-SCNC: 103 MMOL/L (ref 98–107)
CO2 SERPL-SCNC: 26 MMOL/L (ref 21–32)
CORTIS AM PEAK SERPL-MSCNC: 8.9 UG/DL (ref 5–20)
CREAT SERPL-MCNC: 0.69 MG/DL (ref 0.5–1.3)
EOSINOPHIL # BLD AUTO: 0.04 X10*3/UL (ref 0–0.4)
EOSINOPHIL NFR BLD AUTO: 0.5 %
ERYTHROCYTE [DISTWIDTH] IN BLOOD BY AUTOMATED COUNT: 14.7 % (ref 11.5–14.5)
GFR SERPL CREATININE-BSD FRML MDRD: >90 ML/MIN/1.73M*2
GLUCOSE SERPL-MCNC: 95 MG/DL (ref 74–99)
HCT VFR BLD AUTO: 42.4 % (ref 41–52)
HGB BLD-MCNC: 14.1 G/DL (ref 13.5–17.5)
IMM GRANULOCYTES # BLD AUTO: 0.04 X10*3/UL (ref 0–0.5)
IMM GRANULOCYTES NFR BLD AUTO: 0.5 % (ref 0–0.9)
LYMPHOCYTES # BLD AUTO: 1.04 X10*3/UL (ref 0.8–3)
LYMPHOCYTES NFR BLD AUTO: 13 %
MCH RBC QN AUTO: 31.2 PG (ref 26–34)
MCHC RBC AUTO-ENTMCNC: 33.3 G/DL (ref 32–36)
MCV RBC AUTO: 94 FL (ref 80–100)
MONOCYTES # BLD AUTO: 0.72 X10*3/UL (ref 0.05–0.8)
MONOCYTES NFR BLD AUTO: 9 %
NEUTROPHILS # BLD AUTO: 6.09 X10*3/UL (ref 1.6–5.5)
NEUTROPHILS NFR BLD AUTO: 76.5 %
NRBC BLD-RTO: 0 /100 WBCS (ref 0–0)
PLATELET # BLD AUTO: 280 X10*3/UL (ref 150–450)
POTASSIUM SERPL-SCNC: 4.2 MMOL/L (ref 3.5–5.3)
PROT SERPL-MCNC: 6.8 G/DL (ref 6.4–8.2)
RBC # BLD AUTO: 4.52 X10*6/UL (ref 4.5–5.9)
SODIUM SERPL-SCNC: 137 MMOL/L (ref 136–145)
TSH SERPL-ACNC: 0.65 MIU/L (ref 0.44–3.98)
WBC # BLD AUTO: 8 X10*3/UL (ref 4.4–11.3)

## 2023-12-27 PROCEDURE — 2500000004 HC RX 250 GENERAL PHARMACY W/ HCPCS (ALT 636 FOR OP/ED): Mod: JZ | Performed by: CLINICAL NURSE SPECIALIST

## 2023-12-27 PROCEDURE — 82533 TOTAL CORTISOL: CPT

## 2023-12-27 PROCEDURE — 96413 CHEMO IV INFUSION 1 HR: CPT

## 2023-12-27 PROCEDURE — 84443 ASSAY THYROID STIM HORMONE: CPT

## 2023-12-27 PROCEDURE — 80053 COMPREHEN METABOLIC PANEL: CPT

## 2023-12-27 PROCEDURE — 1126F AMNT PAIN NOTED NONE PRSNT: CPT | Performed by: CLINICAL NURSE SPECIALIST

## 2023-12-27 PROCEDURE — 82024 ASSAY OF ACTH: CPT

## 2023-12-27 PROCEDURE — 1036F TOBACCO NON-USER: CPT | Performed by: CLINICAL NURSE SPECIALIST

## 2023-12-27 PROCEDURE — 3078F DIAST BP <80 MM HG: CPT | Performed by: CLINICAL NURSE SPECIALIST

## 2023-12-27 PROCEDURE — 85025 COMPLETE CBC W/AUTO DIFF WBC: CPT

## 2023-12-27 PROCEDURE — 3075F SYST BP GE 130 - 139MM HG: CPT | Performed by: CLINICAL NURSE SPECIALIST

## 2023-12-27 PROCEDURE — 1159F MED LIST DOCD IN RCRD: CPT | Performed by: CLINICAL NURSE SPECIALIST

## 2023-12-27 PROCEDURE — 99214 OFFICE O/P EST MOD 30 MIN: CPT | Performed by: CLINICAL NURSE SPECIALIST

## 2023-12-27 PROCEDURE — 36415 COLL VENOUS BLD VENIPUNCTURE: CPT

## 2023-12-27 RX ORDER — EPINEPHRINE 0.3 MG/.3ML
0.3 INJECTION SUBCUTANEOUS EVERY 5 MIN PRN
Status: CANCELLED | OUTPATIENT
Start: 2024-01-24

## 2023-12-27 RX ORDER — ALBUTEROL SULFATE 0.83 MG/ML
3 SOLUTION RESPIRATORY (INHALATION) AS NEEDED
Status: CANCELLED | OUTPATIENT
Start: 2024-01-24

## 2023-12-27 RX ORDER — EPINEPHRINE 0.3 MG/.3ML
0.3 INJECTION SUBCUTANEOUS EVERY 5 MIN PRN
Status: CANCELLED | OUTPATIENT
Start: 2024-02-07

## 2023-12-27 RX ORDER — LORAZEPAM 0.5 MG/1
0.5 TABLET ORAL NIGHTLY PRN
Qty: 30 TABLET | Refills: 0 | Status: SHIPPED | OUTPATIENT
Start: 2023-12-27 | End: 2024-02-21 | Stop reason: WASHOUT

## 2023-12-27 RX ORDER — EPINEPHRINE 0.3 MG/.3ML
0.3 INJECTION SUBCUTANEOUS EVERY 5 MIN PRN
Status: CANCELLED | OUTPATIENT
Start: 2024-01-10

## 2023-12-27 RX ORDER — PROCHLORPERAZINE MALEATE 10 MG
10 TABLET ORAL EVERY 6 HOURS PRN
Status: CANCELLED | OUTPATIENT
Start: 2024-02-07

## 2023-12-27 RX ORDER — ALBUTEROL SULFATE 0.83 MG/ML
3 SOLUTION RESPIRATORY (INHALATION) AS NEEDED
Status: DISCONTINUED | OUTPATIENT
Start: 2023-12-27 | End: 2023-12-27 | Stop reason: HOSPADM

## 2023-12-27 RX ORDER — FAMOTIDINE 10 MG/ML
20 INJECTION INTRAVENOUS ONCE AS NEEDED
Status: CANCELLED | OUTPATIENT
Start: 2024-01-24

## 2023-12-27 RX ORDER — PROCHLORPERAZINE MALEATE 10 MG
10 TABLET ORAL EVERY 6 HOURS PRN
Status: CANCELLED | OUTPATIENT
Start: 2024-01-24

## 2023-12-27 RX ORDER — ALBUTEROL SULFATE 0.83 MG/ML
3 SOLUTION RESPIRATORY (INHALATION) AS NEEDED
Status: CANCELLED | OUTPATIENT
Start: 2024-02-07

## 2023-12-27 RX ORDER — FAMOTIDINE 10 MG/ML
20 INJECTION INTRAVENOUS ONCE AS NEEDED
Status: CANCELLED | OUTPATIENT
Start: 2024-01-10

## 2023-12-27 RX ORDER — PROCHLORPERAZINE MALEATE 10 MG
10 TABLET ORAL EVERY 6 HOURS PRN
Status: CANCELLED | OUTPATIENT
Start: 2024-01-10

## 2023-12-27 RX ORDER — PROCHLORPERAZINE EDISYLATE 5 MG/ML
10 INJECTION INTRAMUSCULAR; INTRAVENOUS EVERY 6 HOURS PRN
Status: CANCELLED | OUTPATIENT
Start: 2024-02-07

## 2023-12-27 RX ORDER — HEPARIN 100 UNIT/ML
500 SYRINGE INTRAVENOUS AS NEEDED
Status: CANCELLED | OUTPATIENT
Start: 2023-12-27

## 2023-12-27 RX ORDER — PROCHLORPERAZINE EDISYLATE 5 MG/ML
10 INJECTION INTRAMUSCULAR; INTRAVENOUS EVERY 6 HOURS PRN
Status: CANCELLED | OUTPATIENT
Start: 2024-01-24

## 2023-12-27 RX ORDER — DIPHENHYDRAMINE HYDROCHLORIDE 50 MG/ML
50 INJECTION INTRAMUSCULAR; INTRAVENOUS AS NEEDED
Status: CANCELLED | OUTPATIENT
Start: 2024-01-24

## 2023-12-27 RX ORDER — EPINEPHRINE 0.3 MG/.3ML
0.3 INJECTION SUBCUTANEOUS EVERY 5 MIN PRN
Status: DISCONTINUED | OUTPATIENT
Start: 2023-12-27 | End: 2023-12-27 | Stop reason: HOSPADM

## 2023-12-27 RX ORDER — PROCHLORPERAZINE EDISYLATE 5 MG/ML
10 INJECTION INTRAMUSCULAR; INTRAVENOUS EVERY 6 HOURS PRN
Status: DISCONTINUED | OUTPATIENT
Start: 2023-12-27 | End: 2023-12-27 | Stop reason: HOSPADM

## 2023-12-27 RX ORDER — DIPHENHYDRAMINE HYDROCHLORIDE 50 MG/ML
50 INJECTION INTRAMUSCULAR; INTRAVENOUS AS NEEDED
Status: CANCELLED | OUTPATIENT
Start: 2024-01-10

## 2023-12-27 RX ORDER — DIPHENHYDRAMINE HYDROCHLORIDE 50 MG/ML
50 INJECTION INTRAMUSCULAR; INTRAVENOUS AS NEEDED
Status: CANCELLED | OUTPATIENT
Start: 2024-02-07

## 2023-12-27 RX ORDER — ALBUTEROL SULFATE 0.83 MG/ML
3 SOLUTION RESPIRATORY (INHALATION) AS NEEDED
Status: CANCELLED | OUTPATIENT
Start: 2024-01-10

## 2023-12-27 RX ORDER — PROCHLORPERAZINE MALEATE 10 MG
10 TABLET ORAL EVERY 6 HOURS PRN
Status: DISCONTINUED | OUTPATIENT
Start: 2023-12-27 | End: 2023-12-27 | Stop reason: HOSPADM

## 2023-12-27 RX ORDER — PROCHLORPERAZINE EDISYLATE 5 MG/ML
10 INJECTION INTRAMUSCULAR; INTRAVENOUS EVERY 6 HOURS PRN
Status: CANCELLED | OUTPATIENT
Start: 2024-01-10

## 2023-12-27 RX ORDER — FAMOTIDINE 10 MG/ML
20 INJECTION INTRAVENOUS ONCE AS NEEDED
Status: DISCONTINUED | OUTPATIENT
Start: 2023-12-27 | End: 2023-12-27 | Stop reason: HOSPADM

## 2023-12-27 RX ORDER — FAMOTIDINE 10 MG/ML
20 INJECTION INTRAVENOUS ONCE AS NEEDED
Status: CANCELLED | OUTPATIENT
Start: 2024-02-07

## 2023-12-27 RX ORDER — HEPARIN SODIUM,PORCINE/PF 10 UNIT/ML
50 SYRINGE (ML) INTRAVENOUS AS NEEDED
Status: CANCELLED | OUTPATIENT
Start: 2023-12-27

## 2023-12-27 RX ORDER — DIPHENHYDRAMINE HYDROCHLORIDE 50 MG/ML
50 INJECTION INTRAMUSCULAR; INTRAVENOUS AS NEEDED
Status: DISCONTINUED | OUTPATIENT
Start: 2023-12-27 | End: 2023-12-27 | Stop reason: HOSPADM

## 2023-12-27 RX ADMIN — SODIUM CHLORIDE 240 MG: 9 INJECTION, SOLUTION INTRAVENOUS at 11:01

## 2023-12-27 ASSESSMENT — PATIENT HEALTH QUESTIONNAIRE - PHQ9
SUM OF ALL RESPONSES TO PHQ9 QUESTIONS 1 AND 2: 0
1. LITTLE INTEREST OR PLEASURE IN DOING THINGS: NOT AT ALL
2. FEELING DOWN, DEPRESSED OR HOPELESS: NOT AT ALL

## 2023-12-27 ASSESSMENT — COLUMBIA-SUICIDE SEVERITY RATING SCALE - C-SSRS
6. HAVE YOU EVER DONE ANYTHING, STARTED TO DO ANYTHING, OR PREPARED TO DO ANYTHING TO END YOUR LIFE?: NO
1. IN THE PAST MONTH, HAVE YOU WISHED YOU WERE DEAD OR WISHED YOU COULD GO TO SLEEP AND NOT WAKE UP?: NO
2. HAVE YOU ACTUALLY HAD ANY THOUGHTS OF KILLING YOURSELF?: NO

## 2023-12-27 ASSESSMENT — PAIN SCALES - GENERAL: PAINLEVEL: 0-NO PAIN

## 2023-12-27 ASSESSMENT — ENCOUNTER SYMPTOMS
OCCASIONAL FEELINGS OF UNSTEADINESS: 0
LOSS OF SENSATION IN FEET: 0
DEPRESSION: 0

## 2023-12-27 NOTE — PROGRESS NOTES
Patient ID: Ehsan Dias is a 78 y.o. male.    DIAGNOSIS     Malignant right pleural mesothelioma- epithelioid type.  Date of diagnosis: 4/2/2021 from VATS guided pleural mass biopsy the neoplasm shows solid and papillary growth patterns. IHC of  tumor  cells were POSITIVE for calretinin, WT-1, CK5/6 and D2-40 (weak, focal), and NEGATIVE for CEAm, TTF-1, B72.3 and MOC31,        STAGING     Initial T2N0M0  Progression in chest and peritoneum in January 2022        CURRENT SITES OF DISEASE     pleura, mediastinum, peritoneum        MOLECULAR GENOMICS     MUTYH 48.9%  BAP1 17.5%   Tumor Mutational [place] (TMB): 5.8 m/MB Microsatellite Instability (MSI) Status: Stable           SERUM TUMOR MARKER     Not applicable        PRIOR THERAPY     1- Pleurectomy May 7th , 2021        CURRENT THERAPY     02.23.2022 : Started on Ipilimumab and Nivolumab. Documented clinical and radiographic response.  Ipilimumab dropped and nivolumab alone was continued starting December 21, 2022 given toxicities of the combination.        CURRENT ONCOLOGICAL PROBLEMS     1/ R sided pleuritic chest pain , Improving  2. weight loss- in history it seems like this is intentional (need additional data points in future follow ups) - Gaining weight post surgery  3. dry cough - Significantly improved  4- Polypoid lesion seen on CT scan of the chest within the distal esophagus, referred for endoscopy.,  September 2021  5-  ascites starting Jan 2022.  Paracentesis performed in February suggest malignant ascites from mesothelioma.  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  Seen by rheumatology August 2022,  prescribed Celebrex. 1/18/23 - Prednisone taper 10 mg to current 7.5 mg.  Down to prednisone 5 mg with initiation of methotrexate  in July 2023.  Patient notified me on August 23, 2023 that he is not taking his methotrexate and he is fearful of the side effects.  Continues to have polyarthritis symptoms.  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 He was seen by Dr. Catie Harvey in Pulmonary clinic, routine imaging showed right side pleural effusion.  Diagnostic thoracentesis was performed on 7/13 that showed exudative effusion with neutrophilic predominance, cultures negative, cytology negative for carcinoma.   He completed course oral antibiotics and his pain and effusion resolved.  Again, had recurrence of pleuritic chest pain  and pleural effusion   later in 12/2020. The effusion was found to be worse on repeat chest imaging ( CT Chest in 2/2021). Diagnostic and therapeutic thoracentesis was done which showed exudative effusion with lymphocyte predominance. Given his recurrent ipsilateral moderate  size pleural effusion and near complete collapse of the right lower lobe and mild right middle lobe atelectasis on CT chest- he was referred to CT surgeon and a PET CT was also obtained.      3/2021: PET CT revealed right-sided pleural effusion with multiple hypermetabolic  areas of pleural nodularity visualized along the pleural surface of the right lung, which predominantly involve the right lung base. In addition, there were multiple FDG avid mediastinal lymph nodes including enlarged subcarinal lymph nodes (maximum SUV  of 4.4), paratracheal lymph nodes (maximum SUV of 3.1),  "and right hilar  lymph nodes (maximum SUV of 2.9).     3/23/21: was seen by Dr. Dannielle Junior from CT surgery and was scheduled to get R VATS pleural biopsy on 4/2/21: thorascopic exploration showed some adhesions at the diaphragm to the chest wall and also  to the lung.        PAST MEDICAL HISTORY     CAD with MI s/p PCI to RCA with 2 stents,   OA R shoulder s/p replacement,   HTN   elevated PSA with FDG avidity in post lobe of prostate- followed by Dr. 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   former smoker, quit 1969, 2 packs x 8 year  = 16 pack years   smoked marijuana since 1970s; quit few months ago  Possible exposure to asbestos when worked in the [employer].          CURRENT MEDS     see med list         ALLERGIES     nkda         FAMILY HISTORY     no family h/o mesothelioma        Subjective    Today I am meeting with Mr. Murray prior to treatment.  He continues to have some arthralgias, primarily in his hands- he has started methotrexate with the rheumatologist.  He continues on prednisone 5 mg BID and reports that he has been feeling irritable and grouchy lately.  His shoulder pain is quite bothersome- apparently he needs a shoulder replacement.  He is having some trouble sleeping.  Appetite is good and he has gained weight.      Objective    BSA: 2.12 meters squared  /69 (BP Location: Left arm, Patient Position: Sitting, BP Cuff Size: Large adult)   Pulse 82   Temp 36.3 °C (97.3 °F)   Resp 16   Ht 1.765 m (5' 9.49\")   Wt 91.4 kg (201 lb 6.4 oz)   SpO2 96%   BMI 29.33 kg/m²      Physical Exam  Vitals reviewed.   Constitutional:       Appearance: Normal appearance. He is normal weight.   HENT:      Head: Normocephalic.      Nose: Nose normal.      Mouth/Throat:      Mouth: Mucous membranes are moist.      Pharynx: Oropharynx is clear.   Eyes:      Conjunctiva/sclera: Conjunctivae " normal.      Pupils: Pupils are equal, round, and reactive to light.   Cardiovascular:      Rate and Rhythm: Normal rate and regular rhythm.      Pulses: Normal pulses.      Heart sounds: Normal heart sounds.   Pulmonary:      Effort: Pulmonary effort is normal.      Comments: Some pleural rub right base  Abdominal:      General: Abdomen is flat. Bowel sounds are normal.      Palpations: Abdomen is soft.   Musculoskeletal:         General: Normal range of motion.      Cervical back: Normal range of motion.   Skin:     General: Skin is warm.   Neurological:      General: No focal deficit present.      Mental Status: He is alert and oriented to person, place, and time.   Psychiatric:         Mood and Affect: Mood normal.         Behavior: Behavior normal.       Performance Status:  Symptomatic; fully ambulatory    CT 10/13/2023  IMPRESSION:  Malignant mesothelioma, restaging scan.  1. Stable postoperative changes of right pleurectomy without evidence  of recurrent disease. Lymph nodes are stable. No evidence of new  disease in the chest, abdomen, or pelvis.  2. Stable chronic findings as detailed above.          Assessment/Plan     This is a 78-year-old gentleman with malignant epithelioid right pleural mesothelioma diagnosed nearly 2-1/2 years ago.  He is clinically doing well on checkpoint immunotherapy.  He is at nearly 22 months into his treatment with plans to treat through early February 2024. His most recent CT scan showed no evidence of disease recurrence.  He has had a complete response to treatment.  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    He is now taking  methotrexate treatment and the plan is to wean his steroids- he is on 5 mg prednisone BID.  He has been feeling irritable lately, and we discussed trying to wean the evening dose of prednisone- he will call the rheumatologist.    Uses Tylenol for myalgias pain.  He also has  immunotherapy related vitiligo and immune related hypothyroidism.  TSH is .65 today.  I will send in some lorazepam for him while he is trying to wean his steroids.  OARRS was reviewed.  He will return in 1 month with a  scan of his chest prior.       Cancer Staging   No matching staging information was found for the patient.    Oncology History   Malignant neoplasm of mesothelial tissue (CMS/HCC)   2/27/2023 Initial Diagnosis    Malignant neoplasm of mesothelial tissue (CMS/HCC)     10/4/2023 -  Chemotherapy    Nivolumab 240 mg (Biweekly), 28 Day Cycles                   ZEN Bonilla-CNS

## 2023-12-28 LAB — ACTH PLAS-MCNC: 2.8 PG/ML (ref 7.2–63.3)

## 2024-01-10 ENCOUNTER — INFUSION (OUTPATIENT)
Dept: HEMATOLOGY/ONCOLOGY | Facility: HOSPITAL | Age: 79
End: 2024-01-10
Payer: MEDICARE

## 2024-01-10 VITALS
HEART RATE: 74 BPM | OXYGEN SATURATION: 96 % | TEMPERATURE: 97.5 F | RESPIRATION RATE: 16 BRPM | SYSTOLIC BLOOD PRESSURE: 141 MMHG | BODY MASS INDEX: 29.4 KG/M2 | DIASTOLIC BLOOD PRESSURE: 72 MMHG | WEIGHT: 201.94 LBS

## 2024-01-10 DIAGNOSIS — C45.9 MALIGNANT NEOPLASM OF MESOTHELIAL TISSUE (MULTI): ICD-10-CM

## 2024-01-10 LAB
ALBUMIN SERPL BCP-MCNC: 4.2 G/DL (ref 3.4–5)
ALP SERPL-CCNC: 81 U/L (ref 33–136)
ALT SERPL W P-5'-P-CCNC: 31 U/L (ref 10–52)
ANION GAP SERPL CALC-SCNC: 11 MMOL/L (ref 10–20)
AST SERPL W P-5'-P-CCNC: 19 U/L (ref 9–39)
BASOPHILS # BLD AUTO: 0.04 X10*3/UL (ref 0–0.1)
BASOPHILS NFR BLD AUTO: 0.5 %
BILIRUB SERPL-MCNC: 0.5 MG/DL (ref 0–1.2)
BUN SERPL-MCNC: 21 MG/DL (ref 6–23)
CALCIUM SERPL-MCNC: 9.3 MG/DL (ref 8.6–10.3)
CHLORIDE SERPL-SCNC: 104 MMOL/L (ref 98–107)
CO2 SERPL-SCNC: 30 MMOL/L (ref 21–32)
CREAT SERPL-MCNC: 0.83 MG/DL (ref 0.5–1.3)
EGFRCR SERPLBLD CKD-EPI 2021: 89 ML/MIN/1.73M*2
EOSINOPHIL # BLD AUTO: 0.08 X10*3/UL (ref 0–0.4)
EOSINOPHIL NFR BLD AUTO: 1 %
ERYTHROCYTE [DISTWIDTH] IN BLOOD BY AUTOMATED COUNT: 15 % (ref 11.5–14.5)
GLUCOSE SERPL-MCNC: 91 MG/DL (ref 74–99)
HCT VFR BLD AUTO: 43.4 % (ref 41–52)
HGB BLD-MCNC: 14.4 G/DL (ref 13.5–17.5)
IMM GRANULOCYTES # BLD AUTO: 0.06 X10*3/UL (ref 0–0.5)
IMM GRANULOCYTES NFR BLD AUTO: 0.7 % (ref 0–0.9)
LYMPHOCYTES # BLD AUTO: 1.36 X10*3/UL (ref 0.8–3)
LYMPHOCYTES NFR BLD AUTO: 16.3 %
MCH RBC QN AUTO: 31.2 PG (ref 26–34)
MCHC RBC AUTO-ENTMCNC: 33.2 G/DL (ref 32–36)
MCV RBC AUTO: 94 FL (ref 80–100)
MONOCYTES # BLD AUTO: 0.81 X10*3/UL (ref 0.05–0.8)
MONOCYTES NFR BLD AUTO: 9.7 %
NEUTROPHILS # BLD AUTO: 5.97 X10*3/UL (ref 1.6–5.5)
NEUTROPHILS NFR BLD AUTO: 71.8 %
NRBC BLD-RTO: 0 /100 WBCS (ref 0–0)
PLATELET # BLD AUTO: 282 X10*3/UL (ref 150–450)
POTASSIUM SERPL-SCNC: 3.9 MMOL/L (ref 3.5–5.3)
PROT SERPL-MCNC: 6.9 G/DL (ref 6.4–8.2)
RBC # BLD AUTO: 4.62 X10*6/UL (ref 4.5–5.9)
SODIUM SERPL-SCNC: 141 MMOL/L (ref 136–145)
WBC # BLD AUTO: 8.3 X10*3/UL (ref 4.4–11.3)

## 2024-01-10 PROCEDURE — 96413 CHEMO IV INFUSION 1 HR: CPT

## 2024-01-10 PROCEDURE — 85025 COMPLETE CBC W/AUTO DIFF WBC: CPT

## 2024-01-10 PROCEDURE — 84075 ASSAY ALKALINE PHOSPHATASE: CPT

## 2024-01-10 PROCEDURE — 2500000004 HC RX 250 GENERAL PHARMACY W/ HCPCS (ALT 636 FOR OP/ED): Mod: JZ | Performed by: CLINICAL NURSE SPECIALIST

## 2024-01-10 RX ORDER — ALBUTEROL SULFATE 0.83 MG/ML
3 SOLUTION RESPIRATORY (INHALATION) AS NEEDED
Status: DISCONTINUED | OUTPATIENT
Start: 2024-01-10 | End: 2024-01-10 | Stop reason: HOSPADM

## 2024-01-10 RX ORDER — DIPHENHYDRAMINE HYDROCHLORIDE 50 MG/ML
50 INJECTION INTRAMUSCULAR; INTRAVENOUS AS NEEDED
Status: DISCONTINUED | OUTPATIENT
Start: 2024-01-10 | End: 2024-01-10 | Stop reason: HOSPADM

## 2024-01-10 RX ORDER — PROCHLORPERAZINE EDISYLATE 5 MG/ML
10 INJECTION INTRAMUSCULAR; INTRAVENOUS EVERY 6 HOURS PRN
Status: DISCONTINUED | OUTPATIENT
Start: 2024-01-10 | End: 2024-01-10 | Stop reason: HOSPADM

## 2024-01-10 RX ORDER — PROCHLORPERAZINE MALEATE 10 MG
10 TABLET ORAL EVERY 6 HOURS PRN
Status: DISCONTINUED | OUTPATIENT
Start: 2024-01-10 | End: 2024-01-10 | Stop reason: HOSPADM

## 2024-01-10 RX ORDER — HEPARIN SODIUM,PORCINE/PF 10 UNIT/ML
50 SYRINGE (ML) INTRAVENOUS AS NEEDED
OUTPATIENT
Start: 2024-01-10

## 2024-01-10 RX ORDER — EPINEPHRINE 0.3 MG/.3ML
0.3 INJECTION SUBCUTANEOUS EVERY 5 MIN PRN
Status: DISCONTINUED | OUTPATIENT
Start: 2024-01-10 | End: 2024-01-10 | Stop reason: HOSPADM

## 2024-01-10 RX ORDER — HEPARIN 100 UNIT/ML
500 SYRINGE INTRAVENOUS AS NEEDED
OUTPATIENT
Start: 2024-01-10

## 2024-01-10 RX ORDER — FAMOTIDINE 10 MG/ML
20 INJECTION INTRAVENOUS ONCE AS NEEDED
Status: DISCONTINUED | OUTPATIENT
Start: 2024-01-10 | End: 2024-01-10 | Stop reason: HOSPADM

## 2024-01-10 RX ADMIN — SODIUM CHLORIDE 240 MG: 9 INJECTION, SOLUTION INTRAVENOUS at 10:06

## 2024-01-10 ASSESSMENT — PAIN SCALES - GENERAL: PAINLEVEL: 0-NO PAIN

## 2024-01-18 ENCOUNTER — OFFICE VISIT (OUTPATIENT)
Dept: PRIMARY CARE | Facility: CLINIC | Age: 79
End: 2024-01-18
Payer: MEDICARE

## 2024-01-18 VITALS
DIASTOLIC BLOOD PRESSURE: 76 MMHG | SYSTOLIC BLOOD PRESSURE: 124 MMHG | HEART RATE: 80 BPM | BODY MASS INDEX: 29.41 KG/M2 | OXYGEN SATURATION: 97 % | WEIGHT: 202 LBS | RESPIRATION RATE: 12 BRPM

## 2024-01-18 DIAGNOSIS — Z00.00 HEALTHCARE MAINTENANCE: Primary | ICD-10-CM

## 2024-01-18 PROCEDURE — 3074F SYST BP LT 130 MM HG: CPT | Performed by: FAMILY MEDICINE

## 2024-01-18 PROCEDURE — 1160F RVW MEDS BY RX/DR IN RCRD: CPT | Performed by: FAMILY MEDICINE

## 2024-01-18 PROCEDURE — 3078F DIAST BP <80 MM HG: CPT | Performed by: FAMILY MEDICINE

## 2024-01-18 PROCEDURE — 1036F TOBACCO NON-USER: CPT | Performed by: FAMILY MEDICINE

## 2024-01-18 PROCEDURE — 1126F AMNT PAIN NOTED NONE PRSNT: CPT | Performed by: FAMILY MEDICINE

## 2024-01-18 PROCEDURE — 1170F FXNL STATUS ASSESSED: CPT | Performed by: FAMILY MEDICINE

## 2024-01-18 PROCEDURE — 1159F MED LIST DOCD IN RCRD: CPT | Performed by: FAMILY MEDICINE

## 2024-01-18 PROCEDURE — G0439 PPPS, SUBSEQ VISIT: HCPCS | Performed by: FAMILY MEDICINE

## 2024-01-18 ASSESSMENT — ENCOUNTER SYMPTOMS
ARTHRALGIAS: 0
ADENOPATHY: 0
CONSTIPATION: 0
DIFFICULTY URINATING: 0
SORE THROAT: 0
WEAKNESS: 0
OCCASIONAL FEELINGS OF UNSTEADINESS: 0
BRUISES/BLEEDS EASILY: 0
ABDOMINAL PAIN: 0
DIZZINESS: 0
APPETITE CHANGE: 0
BACK PAIN: 0
HEADACHES: 0
DEPRESSION: 0
FEVER: 0
EYE PAIN: 0
CHEST TIGHTNESS: 0
CHILLS: 0
EYE REDNESS: 0
ABDOMINAL DISTENTION: 0
BLOOD IN STOOL: 0
DYSPHORIC MOOD: 0
LOSS OF SENSATION IN FEET: 0
FATIGUE: 0
DYSURIA: 0
COUGH: 0
SHORTNESS OF BREATH: 0
NERVOUS/ANXIOUS: 0
DIARRHEA: 0

## 2024-01-18 ASSESSMENT — PATIENT HEALTH QUESTIONNAIRE - PHQ9
1. LITTLE INTEREST OR PLEASURE IN DOING THINGS: NOT AT ALL
2. FEELING DOWN, DEPRESSED OR HOPELESS: NOT AT ALL
SUM OF ALL RESPONSES TO PHQ9 QUESTIONS 1 AND 2: 0

## 2024-01-18 ASSESSMENT — ACTIVITIES OF DAILY LIVING (ADL)
DOING_HOUSEWORK: INDEPENDENT
MANAGING_FINANCES: INDEPENDENT
GROCERY_SHOPPING: INDEPENDENT
BATHING: INDEPENDENT
DRESSING: INDEPENDENT
TAKING_MEDICATION: INDEPENDENT

## 2024-01-18 NOTE — PROGRESS NOTES
Subjective   Reason for Visit: Ehsan Dias is an 78 y.o. male here for a Medicare Wellness visit.     Past Medical, Surgical, and Family History reviewed and updated in chart.    Reviewed all medications by prescribing practitioner or clinical pharmacist (such as prescriptions, OTCs, herbal therapies and supplements) and documented in the medical record.    HPI    Patient Care Team:  Vikash De Los Santos MD as PCP - General  Vikash De Los Santos MD as PCP - [employer] PCP  Shawn Blum MD as Consulting Physician (Hematology and Oncology)     Review of Systems    Objective   Vitals:  /76   Pulse 80   Resp 12   Wt 91.6 kg (202 lb)   SpO2 97%   BMI 29.41 kg/m²       Physical Exam    Assessment/Plan   Problem List Items Addressed This Visit    None

## 2024-01-18 NOTE — PROGRESS NOTES
Subjective   Patient ID: Ehsan Dias is a 78 y.o. male who presents for Medicare Annual Wellness Visit Subsequent.  PMHX, PSHx, Fam hx, and Social hx reviewed.   New concerns - doing well on Immunotherapy and Prednisone for RA/ mesothelioma. He has not had cystoscopy or MRI prostate recommended by Dr Carrington for elevated PSA  Vaccines TDAP, Shingrix vaccine  Dentist seen at least yearly yes  Vision concerns none  Hearing concerns  - he has mild hearing loss,   Diet is not overall healthy.   Smoker - no  Alcohol use - none  Exercising 0 days per week.   Sexually active - no  Colonoscopy no longer indicated       Medicare Wellness Billing Compliance Satisfied    *This is a visual tool to show completion of required items on the day of the visit. Green checks will only appear on the date of visit.    Review all medications by prescribing practitioner or clinical pharmacist (such as prescriptions, OTCs, herbal therapies and supplements) documented in the medical record    Past Medical, Surgical, and Family History reviewed and updated in chart    Tobacco Use Reviewed    Alcohol Use Reviewed    Illicit Drug Use Reviewed    PHQ2/9    Falls in Last Year Reviewed    Home Safety Risk Factors Reviewed    Cognitive Impairment Reviewed    Patient Self Assessment and Health Status    Current Diet Reviewed    Exercise Frequency    ADL - Hearing Impairment    ADL - Bathing    ADL - Dressing    ADL - Walks in Home    IADL - Managing Finances    IADL - Grocery Shopping    IADL - Taking Medications    IADL - Doing Housework        Review of Systems   Constitutional:  Negative for appetite change, chills, fatigue and fever.   HENT:  Negative for congestion, hearing loss and sore throat.    Eyes:  Negative for pain, redness and visual disturbance.   Respiratory:  Negative for cough, chest tightness and shortness of breath.    Cardiovascular:  Negative for chest pain and leg swelling.   Gastrointestinal:   Negative for abdominal distention, abdominal pain, blood in stool, constipation and diarrhea.   Genitourinary:  Negative for difficulty urinating and dysuria.   Musculoskeletal:  Negative for arthralgias and back pain.   Skin:  Negative for rash.   Neurological:  Negative for dizziness, weakness and headaches.   Hematological:  Negative for adenopathy. Does not bruise/bleed easily.   Psychiatric/Behavioral:  Negative for dysphoric mood. The patient is not nervous/anxious.        Objective   /76   Pulse 80   Resp 12   Wt 91.6 kg (202 lb)   SpO2 97%   BMI 29.41 kg/m²    Physical Exam  Constitutional:       General: He is not in acute distress.     Appearance: Normal appearance. He is not ill-appearing.   HENT:      Head: Normocephalic and atraumatic.      Right Ear: Tympanic membrane, ear canal and external ear normal.      Left Ear: Tympanic membrane, ear canal and external ear normal.      Nose: Nose normal.      Mouth/Throat:      Mouth: Mucous membranes are moist.      Pharynx: No oropharyngeal exudate or posterior oropharyngeal erythema.   Eyes:      Extraocular Movements: Extraocular movements intact.      Conjunctiva/sclera: Conjunctivae normal.      Pupils: Pupils are equal, round, and reactive to light.   Neck:      Vascular: No carotid bruit.   Cardiovascular:      Rate and Rhythm: Normal rate and regular rhythm.      Heart sounds: Normal heart sounds. No murmur heard.  Pulmonary:      Breath sounds: Normal breath sounds. No wheezing, rhonchi or rales.   Abdominal:      General: Bowel sounds are normal. There is no distension.      Palpations: Abdomen is soft. There is no mass.      Tenderness: There is no abdominal tenderness.   Musculoskeletal:         General: No swelling or deformity.      Cervical back: Neck supple. No tenderness.   Lymphadenopathy:      Cervical: No cervical adenopathy.   Skin:     General: Skin is warm and dry.      Findings: No lesion or rash.   Neurological:      Mental  Status: He is alert and oriented to person, place, and time.      Sensory: No sensory deficit.      Motor: No weakness.      Coordination: Coordination normal.      Deep Tendon Reflexes: Reflexes normal.   Psychiatric:         Mood and Affect: Mood normal.         Behavior: Behavior normal.         Judgment: Judgment normal.         Assessment/Plan   Diagnoses and all orders for this visit:  Healthcare maintenance - Tetanus and Shingles vaccines recommended at pharmacy. Recent labs reviewed and discussed. Colonoscopy not indicated.     Discussed that he should follow up with DR Carrington for plan for elevated PSA being he does not want unsedated cystoscopy.    Weight - recommend low carb diet, increasing water intake to at least 64oz/day, healthy snacking between meals, and regular cardiovascular exercise 150mins/week. Goal for weight loss is 1-2# per week.     Follow up in 6 months, 30min

## 2024-01-22 ENCOUNTER — APPOINTMENT (OUTPATIENT)
Dept: RADIOLOGY | Facility: HOSPITAL | Age: 79
End: 2024-01-22
Payer: MEDICARE

## 2024-01-22 ENCOUNTER — ANCILLARY PROCEDURE (OUTPATIENT)
Dept: RADIOLOGY | Facility: CLINIC | Age: 79
End: 2024-01-22
Payer: MEDICARE

## 2024-01-22 DIAGNOSIS — C45.9 MALIGNANT NEOPLASM OF MESOTHELIAL TISSUE (MULTI): ICD-10-CM

## 2024-01-22 PROCEDURE — 2550000001 HC RX 255 CONTRASTS: Performed by: CLINICAL NURSE SPECIALIST

## 2024-01-22 PROCEDURE — 71260 CT THORAX DX C+: CPT | Performed by: RADIOLOGY

## 2024-01-22 PROCEDURE — 74177 CT ABD & PELVIS W/CONTRAST: CPT | Performed by: RADIOLOGY

## 2024-01-22 PROCEDURE — 74177 CT ABD & PELVIS W/CONTRAST: CPT

## 2024-01-22 RX ADMIN — IOHEXOL 70 ML: 350 INJECTION, SOLUTION INTRAVENOUS at 10:18

## 2024-01-24 ENCOUNTER — OFFICE VISIT (OUTPATIENT)
Dept: HEMATOLOGY/ONCOLOGY | Facility: HOSPITAL | Age: 79
End: 2024-01-24
Payer: MEDICARE

## 2024-01-24 ENCOUNTER — INFUSION (OUTPATIENT)
Dept: HEMATOLOGY/ONCOLOGY | Facility: HOSPITAL | Age: 79
End: 2024-01-24
Payer: MEDICARE

## 2024-01-24 VITALS
BODY MASS INDEX: 29.22 KG/M2 | TEMPERATURE: 97.3 F | WEIGHT: 200.7 LBS | SYSTOLIC BLOOD PRESSURE: 142 MMHG | HEART RATE: 82 BPM | OXYGEN SATURATION: 99 % | DIASTOLIC BLOOD PRESSURE: 76 MMHG | RESPIRATION RATE: 17 BRPM

## 2024-01-24 DIAGNOSIS — C45.9 MALIGNANT NEOPLASM OF MESOTHELIAL TISSUE (MULTI): ICD-10-CM

## 2024-01-24 DIAGNOSIS — C45.9 MALIGNANT NEOPLASM OF MESOTHELIAL TISSUE (MULTI): Primary | ICD-10-CM

## 2024-01-24 LAB
ALBUMIN SERPL BCP-MCNC: 4.3 G/DL (ref 3.4–5)
ALP SERPL-CCNC: 80 U/L (ref 33–136)
ALT SERPL W P-5'-P-CCNC: 36 U/L (ref 10–52)
ANION GAP SERPL CALC-SCNC: 12 MMOL/L (ref 10–20)
AST SERPL W P-5'-P-CCNC: 26 U/L (ref 9–39)
BASOPHILS # BLD AUTO: 0.02 X10*3/UL (ref 0–0.1)
BASOPHILS NFR BLD AUTO: 0.2 %
BILIRUB SERPL-MCNC: 0.7 MG/DL (ref 0–1.2)
BUN SERPL-MCNC: 19 MG/DL (ref 6–23)
CALCIUM SERPL-MCNC: 9.6 MG/DL (ref 8.6–10.3)
CHLORIDE SERPL-SCNC: 101 MMOL/L (ref 98–107)
CO2 SERPL-SCNC: 30 MMOL/L (ref 21–32)
CORTIS AM PEAK SERPL-MSCNC: 10.1 UG/DL (ref 5–20)
CREAT SERPL-MCNC: 0.8 MG/DL (ref 0.5–1.3)
EGFRCR SERPLBLD CKD-EPI 2021: 90 ML/MIN/1.73M*2
EOSINOPHIL # BLD AUTO: 0.06 X10*3/UL (ref 0–0.4)
EOSINOPHIL NFR BLD AUTO: 0.7 %
ERYTHROCYTE [DISTWIDTH] IN BLOOD BY AUTOMATED COUNT: 15.6 % (ref 11.5–14.5)
GLUCOSE SERPL-MCNC: 82 MG/DL (ref 74–99)
HCT VFR BLD AUTO: 43.8 % (ref 41–52)
HGB BLD-MCNC: 14.6 G/DL (ref 13.5–17.5)
IMM GRANULOCYTES # BLD AUTO: 0.04 X10*3/UL (ref 0–0.5)
IMM GRANULOCYTES NFR BLD AUTO: 0.5 % (ref 0–0.9)
LYMPHOCYTES # BLD AUTO: 0.97 X10*3/UL (ref 0.8–3)
LYMPHOCYTES NFR BLD AUTO: 12.1 %
MCH RBC QN AUTO: 31.5 PG (ref 26–34)
MCHC RBC AUTO-ENTMCNC: 33.3 G/DL (ref 32–36)
MCV RBC AUTO: 95 FL (ref 80–100)
MONOCYTES # BLD AUTO: 0.59 X10*3/UL (ref 0.05–0.8)
MONOCYTES NFR BLD AUTO: 7.3 %
NEUTROPHILS # BLD AUTO: 6.35 X10*3/UL (ref 1.6–5.5)
NEUTROPHILS NFR BLD AUTO: 79.2 %
NRBC BLD-RTO: 0 /100 WBCS (ref 0–0)
PLATELET # BLD AUTO: 264 X10*3/UL (ref 150–450)
POTASSIUM SERPL-SCNC: 4.3 MMOL/L (ref 3.5–5.3)
PROT SERPL-MCNC: 7.2 G/DL (ref 6.4–8.2)
RBC # BLD AUTO: 4.63 X10*6/UL (ref 4.5–5.9)
SODIUM SERPL-SCNC: 139 MMOL/L (ref 136–145)
TSH SERPL-ACNC: 1.29 MIU/L (ref 0.44–3.98)
WBC # BLD AUTO: 8 X10*3/UL (ref 4.4–11.3)

## 2024-01-24 PROCEDURE — 1126F AMNT PAIN NOTED NONE PRSNT: CPT | Performed by: INTERNAL MEDICINE

## 2024-01-24 PROCEDURE — 3077F SYST BP >= 140 MM HG: CPT | Performed by: INTERNAL MEDICINE

## 2024-01-24 PROCEDURE — 82533 TOTAL CORTISOL: CPT

## 2024-01-24 PROCEDURE — 84443 ASSAY THYROID STIM HORMONE: CPT

## 2024-01-24 PROCEDURE — 1159F MED LIST DOCD IN RCRD: CPT | Performed by: INTERNAL MEDICINE

## 2024-01-24 PROCEDURE — 1157F ADVNC CARE PLAN IN RCRD: CPT | Performed by: INTERNAL MEDICINE

## 2024-01-24 PROCEDURE — 82024 ASSAY OF ACTH: CPT

## 2024-01-24 PROCEDURE — 99215 OFFICE O/P EST HI 40 MIN: CPT | Performed by: INTERNAL MEDICINE

## 2024-01-24 PROCEDURE — 2500000004 HC RX 250 GENERAL PHARMACY W/ HCPCS (ALT 636 FOR OP/ED): Mod: JZ | Performed by: CLINICAL NURSE SPECIALIST

## 2024-01-24 PROCEDURE — 1036F TOBACCO NON-USER: CPT | Performed by: INTERNAL MEDICINE

## 2024-01-24 PROCEDURE — 99215 OFFICE O/P EST HI 40 MIN: CPT | Mod: 25 | Performed by: INTERNAL MEDICINE

## 2024-01-24 PROCEDURE — 1123F ACP DISCUSS/DSCN MKR DOCD: CPT | Performed by: INTERNAL MEDICINE

## 2024-01-24 PROCEDURE — 80053 COMPREHEN METABOLIC PANEL: CPT

## 2024-01-24 PROCEDURE — 1160F RVW MEDS BY RX/DR IN RCRD: CPT | Performed by: INTERNAL MEDICINE

## 2024-01-24 PROCEDURE — 3078F DIAST BP <80 MM HG: CPT | Performed by: INTERNAL MEDICINE

## 2024-01-24 PROCEDURE — 85025 COMPLETE CBC W/AUTO DIFF WBC: CPT

## 2024-01-24 PROCEDURE — 96413 CHEMO IV INFUSION 1 HR: CPT

## 2024-01-24 RX ORDER — EPINEPHRINE 0.3 MG/.3ML
0.3 INJECTION SUBCUTANEOUS EVERY 5 MIN PRN
Status: DISCONTINUED | OUTPATIENT
Start: 2024-01-24 | End: 2024-01-24 | Stop reason: HOSPADM

## 2024-01-24 RX ORDER — FAMOTIDINE 10 MG/ML
20 INJECTION INTRAVENOUS ONCE AS NEEDED
Status: DISCONTINUED | OUTPATIENT
Start: 2024-01-24 | End: 2024-01-24 | Stop reason: HOSPADM

## 2024-01-24 RX ORDER — PROCHLORPERAZINE MALEATE 10 MG
10 TABLET ORAL EVERY 6 HOURS PRN
Status: DISCONTINUED | OUTPATIENT
Start: 2024-01-24 | End: 2024-01-24 | Stop reason: HOSPADM

## 2024-01-24 RX ORDER — PROCHLORPERAZINE EDISYLATE 5 MG/ML
10 INJECTION INTRAMUSCULAR; INTRAVENOUS EVERY 6 HOURS PRN
Status: DISCONTINUED | OUTPATIENT
Start: 2024-01-24 | End: 2024-01-24 | Stop reason: HOSPADM

## 2024-01-24 RX ORDER — DIPHENHYDRAMINE HYDROCHLORIDE 50 MG/ML
50 INJECTION INTRAMUSCULAR; INTRAVENOUS AS NEEDED
Status: DISCONTINUED | OUTPATIENT
Start: 2024-01-24 | End: 2024-01-24 | Stop reason: HOSPADM

## 2024-01-24 RX ORDER — ALBUTEROL SULFATE 0.83 MG/ML
3 SOLUTION RESPIRATORY (INHALATION) AS NEEDED
Status: DISCONTINUED | OUTPATIENT
Start: 2024-01-24 | End: 2024-01-24 | Stop reason: HOSPADM

## 2024-01-24 RX ADMIN — SODIUM CHLORIDE 240 MG: 9 INJECTION, SOLUTION INTRAVENOUS at 13:20

## 2024-01-24 ASSESSMENT — ENCOUNTER SYMPTOMS
FATIGUE: 0
COUGH: 0
CHILLS: 0
JOINT SWELLING: 0
LOSS OF SENSATION IN FEET: 1
VERTIGO: 0
FEVER: 0
DEPRESSION: 0
NECK PAIN: 0
ARTHRALGIAS: 0
WEAKNESS: 0
NAUSEA: 0
SWOLLEN GLANDS: 0
SORE THROAT: 0
VOMITING: 0
ANOREXIA: 0
ABDOMINAL PAIN: 0
HEADACHES: 0
NUMBNESS: 1
MYALGIAS: 0
DIAPHORESIS: 0
OCCASIONAL FEELINGS OF UNSTEADINESS: 0
CHANGE IN BOWEL HABIT: 0
VISUAL CHANGE: 0

## 2024-01-24 ASSESSMENT — PAIN SCALES - GENERAL: PAINLEVEL: 0-NO PAIN

## 2024-01-24 NOTE — PROGRESS NOTES
Patient ID: Ehsan Dias is a 78 y.o. male.    DIAGNOSIS     Malignant right pleural mesothelioma- epithelioid type.  Date of diagnosis: 4/2/2021 from VATS guided pleural mass biopsy the neoplasm shows solid and papillary growth patterns. IHC of  tumor  cells were POSITIVE for calretinin, WT-1, CK5/6 and D2-40 (weak, focal), and NEGATIVE for CEAm, TTF-1, B72.3 and MOC31,        STAGING     Initial T2N0M0  Progression in chest and peritoneum in January 2022        CURRENT SITES OF DISEASE     pleura, mediastinum, peritoneum        MOLECULAR GENOMICS     MUTYH 48.9%  BAP1 17.5%   Tumor Mutational [place] (TMB): 5.8 m/MB Microsatellite Instability (MSI) Status: Stable           SERUM TUMOR MARKER     Not applicable        PRIOR THERAPY     1- Pleurectomy May 7th , 2021        CURRENT THERAPY     02.23.2022 : Started on Ipilimumab and Nivolumab. Documented clinical and radiographic response.  Ipilimumab dropped and nivolumab alone was continued starting December 21, 2022 given toxicities of the combination.        CURRENT ONCOLOGICAL PROBLEMS     1/ R sided pleuritic chest pain , Improving  2. weight loss- in history it seems like this is intentional (need additional data points in future follow ups) - Gaining weight post surgery  3. dry cough - Significantly improved  4- Polypoid lesion seen on CT scan of the chest within the distal esophagus, referred for endoscopy.,  September 2021  5-  ascites starting Jan 2022.  Paracentesis performed in February suggest malignant ascites from mesothelioma.  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  Seen by rheumatology August 2022,  prescribed Celebrex. 1/18/23 - Prednisone taper 10 mg to current 7.5 mg.  Down to prednisone 5 mg with initiation of methotrexate  in July 2023.  Patient notified me on August 23, 2023 that he is not taking his methotrexate and he is fearful of the side effects.  Continues to have polyarthritis symptoms.  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 He was seen by Dr. Catie Harvey in Pulmonary clinic, routine imaging showed right side pleural effusion.  Diagnostic thoracentesis was performed on 7/13 that showed exudative effusion with neutrophilic predominance, cultures negative, cytology negative for carcinoma.   He completed course oral antibiotics and his pain and effusion resolved.  Again, had recurrence of pleuritic chest pain  and pleural effusion   later in 12/2020. The effusion was found to be worse on repeat chest imaging ( CT Chest in 2/2021). Diagnostic and therapeutic thoracentesis was done which showed exudative effusion with lymphocyte predominance. Given his recurrent ipsilateral moderate  size pleural effusion and near complete collapse of the right lower lobe and mild right middle lobe atelectasis on CT chest- he was referred to CT surgeon and a PET CT was also obtained.      3/2021: PET CT revealed right-sided pleural effusion with multiple hypermetabolic  areas of pleural nodularity visualized along the pleural surface of the right lung, which predominantly involve the right lung base. In addition, there were multiple FDG avid mediastinal lymph nodes including enlarged subcarinal lymph nodes (maximum SUV  of 4.4), paratracheal lymph nodes (maximum SUV of 3.1),  and right hilar  lymph nodes (maximum SUV of 2.9).     3/23/21: was seen by Dr. Dannielle Junior from CT surgery and was scheduled to get R VATS pleural biopsy on 4/2/21: thorascopic exploration showed some adhesions at the diaphragm to the chest wall and also  to the lung.        PAST MEDICAL HISTORY     CAD with MI s/p PCI to RCA with 2 stents,   OA R shoulder s/p replacement,   HTN   elevated PSA with FDG avidity in post lobe of prostate- followed by Dr. 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   former smoker, quit 1969, 2 packs x 8 year  = 16 pack years   smoked marijuana since 1970s; quit few months ago  Possible exposure to asbestos when worked in the [employer].          CURRENT MEDS     see med list         ALLERGIES     nkda         FAMILY HISTORY     no family h/o mesothelioma        Subjective    Cancer  Associated symptoms include numbness and a rash. Pertinent negatives include no abdominal pain, anorexia, arthralgias, change in bowel habit, chest pain, chills, congestion, coughing, diaphoresis, fatigue, fever, headaches, joint swelling, myalgias, nausea, neck pain, sore throat, swollen glands, urinary symptoms, vertigo, visual change, vomiting or weakness.       Patient overall is clinically doing well.  His arthritis symptoms have significantly improved since starting methotrexate about a month ago given to him by his rheumatologist.  He is also on folic acid.  No nausea no vomiting bowel movements regular, no new headaches, urinating well, no increase in shortness of breath.      Objective    BSA: 2.11 meters squared  /76   Pulse 82   Temp 36.3 °C (97.3 °F) (Skin)   Resp 17   Wt 91 kg (200 lb 11.2 oz)   SpO2 99%   BMI 29.22 kg/m²      Physical Exam  Constitutional:       Appearance: He is not ill-appearing, toxic-appearing or diaphoretic.   HENT:      Nose: No congestion.       Mouth/Throat:      Pharynx: No oropharyngeal exudate or posterior oropharyngeal erythema.   Eyes:      General: No scleral icterus.     Conjunctiva/sclera: Conjunctivae normal.   Cardiovascular:      Rate and Rhythm: Normal rate and regular rhythm.      Pulses: Normal pulses.      Heart sounds: Normal heart sounds. No murmur heard.     No friction rub. No gallop.   Pulmonary:      Effort: No respiratory distress.      Breath sounds: No stridor. No wheezing, rhonchi or rales.   Chest:      Chest wall: No tenderness.   Abdominal:      General: There is no distension.      Palpations: There is no mass.      Tenderness: There is no abdominal tenderness. There is no guarding or rebound.   Musculoskeletal:      Cervical back: No tenderness.      Right lower leg: No edema.      Left lower leg: No edema.   Lymphadenopathy:      Cervical: Cervical adenopathy present.   Skin:     Coloration: Skin is not jaundiced or pale.      Findings: Rash present. No bruising or erythema.   Neurological:      General: No focal deficit present.      Mental Status: He is oriented to person, place, and time.   Psychiatric:         Mood and Affect: Mood normal.         Behavior: Behavior normal.         Performance Status:  Asymptomatic     Latest Reference Range & Units 01/10/24 09:33   GLUCOSE 74 - 99 mg/dL 91   SODIUM 136 - 145 mmol/L 141   POTASSIUM 3.5 - 5.3 mmol/L 3.9   CHLORIDE 98 - 107 mmol/L 104   Bicarbonate 21 - 32 mmol/L 30   Anion Gap 10 - 20 mmol/L 11   Blood Urea Nitrogen 6 - 23 mg/dL 21   Creatinine 0.50 - 1.30 mg/dL 0.83   EGFR >60 mL/min/1.73m*2 89   Calcium 8.6 - 10.3 mg/dL 9.3   Albumin 3.4 - 5.0 g/dL 4.2   Alkaline Phosphatase 33 - 136 U/L 81   ALT 10 - 52 U/L 31   AST 9 - 39 U/L 19   Bilirubin Total 0.0 - 1.2 mg/dL 0.5   Total Protein 6.4 - 8.2 g/dL 6.9   WBC 4.4 - 11.3 x10*3/uL 8.3   nRBC 0.0 - 0.0 /100 WBCs 0.0   RBC 4.50 - 5.90 x10*6/uL 4.62   HEMOGLOBIN 13.5 - 17.5 g/dL 14.4   HEMATOCRIT 41.0 - 52.0 % 43.4   MCV 80 -  100 fL 94   MCH 26.0 - 34.0 pg 31.2   MCHC 32.0 - 36.0 g/dL 33.2   RED CELL DISTRIBUTION WIDTH 11.5 - 14.5 % 15.0 (H)   Platelets 150 - 450 x10*3/uL 282   Neutrophils % 40.0 - 80.0 % 71.8   Immature Granulocytes %, Automated 0.0 - 0.9 % 0.7   Lymphocytes % 13.0 - 44.0 % 16.3   Monocytes % 2.0 - 10.0 % 9.7   Eosinophils % 0.0 - 6.0 % 1.0   Basophils % 0.0 - 2.0 % 0.5   Neutrophils Absolute 1.60 - 5.50 x10*3/uL 5.97 (H)   Immature Granulocytes Absolute, Automated 0.00 - 0.50 x10*3/uL 0.06   Lymphocytes Absolute 0.80 - 3.00 x10*3/uL 1.36   Monocytes Absolute 0.05 - 0.80 x10*3/uL 0.81 (H)   Eosinophils Absolute 0.00 - 0.40 x10*3/uL 0.08   Basophils Absolute 0.00 - 0.10 x10*3/uL 0.04   (H): Data is abnormally high    CT C/A/P 1/22/2024  IMPRESSION:  CHEST:      Stable postsurgical changes in the right hemithorax with dense mesh  and slight thickening along the right diaphragm. No definite evidence  of residual or emerging neoplastic disease. Stable 3 mm indeterminate  left lower lobe pulmonary nodule. No significant change from  10/13/2023.      ABDOMEN AND PELVIS:      No evidence of emerging metastatic disease.      Diffuse fatty infiltration of the liver with too small to  characterize hypodensity anteriorly in the right lobe similar to  10/13/2023.      Small gallstone(s).      Enlarged heterogenous prostate indenting the bladder.      Additional findings as described above similar to the prior exam.    Assessment/Plan       This is a 78-year-old gentleman with epithelioid mesothelioma of the right pleural space diagnosed in April 2021.  He was started on combination immunotherapy as of February 23, 2022 switched to single agent immunotherapy due to toxicities and will be completing his 2 years of treatment within the next few weeks.  He had a complete response to treatment.  Review his most recent CT scan that I personally reviewed shows continuation of this response.  His immune related adverse events have included  immune related hypothyroidism, immune related inflammatory symmetrical polyarthritis and immunotherapy related vitiligo.    Cancer Staging   No matching staging information was found for the patient.    Oncology History   Malignant neoplasm of mesothelial tissue (CMS/HCC)   2/27/2023 Initial Diagnosis    Malignant neoplasm of mesothelial tissue (CMS/HCC)     10/4/2023 -  Chemotherapy    Nivolumab 240 mg (Biweekly), 28 Day Cycles                   Shawn Blum MD

## 2024-01-25 ENCOUNTER — APPOINTMENT (OUTPATIENT)
Dept: RHEUMATOLOGY | Facility: CLINIC | Age: 79
End: 2024-01-25
Payer: MEDICARE

## 2024-01-26 DIAGNOSIS — M19.90 UNSPECIFIED OSTEOARTHRITIS, UNSPECIFIED SITE: ICD-10-CM

## 2024-01-26 LAB — ACTH PLAS-MCNC: <1.5 PG/ML (ref 7.2–63.3)

## 2024-01-26 RX ORDER — FOLIC ACID 1 MG/1
1 TABLET ORAL DAILY
Qty: 90 TABLET | Refills: 1 | Status: SHIPPED | OUTPATIENT
Start: 2024-01-26 | End: 2024-01-30 | Stop reason: SDUPTHER

## 2024-01-30 ENCOUNTER — OFFICE VISIT (OUTPATIENT)
Dept: RHEUMATOLOGY | Facility: CLINIC | Age: 79
End: 2024-01-30
Payer: MEDICARE

## 2024-01-30 VITALS
DIASTOLIC BLOOD PRESSURE: 87 MMHG | BODY MASS INDEX: 28.63 KG/M2 | TEMPERATURE: 97.4 F | WEIGHT: 200 LBS | SYSTOLIC BLOOD PRESSURE: 151 MMHG | HEART RATE: 85 BPM | HEIGHT: 70 IN

## 2024-01-30 DIAGNOSIS — M06.4 POLYARTHRITIS, INFLAMMATORY (MULTI): ICD-10-CM

## 2024-01-30 DIAGNOSIS — M19.90 UNSPECIFIED OSTEOARTHRITIS, UNSPECIFIED SITE: ICD-10-CM

## 2024-01-30 DIAGNOSIS — E03.9 HYPOTHYROIDISM, UNSPECIFIED TYPE: Primary | ICD-10-CM

## 2024-01-30 PROCEDURE — 99214 OFFICE O/P EST MOD 30 MIN: CPT | Performed by: INTERNAL MEDICINE

## 2024-01-30 PROCEDURE — 1123F ACP DISCUSS/DSCN MKR DOCD: CPT | Performed by: INTERNAL MEDICINE

## 2024-01-30 PROCEDURE — 1159F MED LIST DOCD IN RCRD: CPT | Performed by: INTERNAL MEDICINE

## 2024-01-30 PROCEDURE — 99214 OFFICE O/P EST MOD 30 MIN: CPT | Mod: GC | Performed by: INTERNAL MEDICINE

## 2024-01-30 PROCEDURE — 3077F SYST BP >= 140 MM HG: CPT | Performed by: INTERNAL MEDICINE

## 2024-01-30 PROCEDURE — 1126F AMNT PAIN NOTED NONE PRSNT: CPT | Performed by: INTERNAL MEDICINE

## 2024-01-30 PROCEDURE — 1157F ADVNC CARE PLAN IN RCRD: CPT | Performed by: INTERNAL MEDICINE

## 2024-01-30 PROCEDURE — 3079F DIAST BP 80-89 MM HG: CPT | Performed by: INTERNAL MEDICINE

## 2024-01-30 PROCEDURE — 1036F TOBACCO NON-USER: CPT | Performed by: INTERNAL MEDICINE

## 2024-01-30 RX ORDER — LEVOTHYROXINE SODIUM 100 UG/1
100 TABLET ORAL DAILY
Qty: 30 TABLET | Refills: 0 | Status: SHIPPED | OUTPATIENT
Start: 2024-01-30 | End: 2024-05-15

## 2024-01-30 RX ORDER — METHOTREXATE 2.5 MG/1
20 TABLET ORAL
Qty: 102.857 TABLET | Refills: 1 | Status: SHIPPED | OUTPATIENT
Start: 2024-01-30 | End: 2024-04-23 | Stop reason: SDUPTHER

## 2024-01-30 RX ORDER — FOLIC ACID 1 MG/1
1 TABLET ORAL DAILY
Qty: 90 TABLET | Refills: 1 | Status: SHIPPED | OUTPATIENT
Start: 2024-01-30 | End: 2024-04-23 | Stop reason: SDUPTHER

## 2024-01-30 RX ORDER — PREDNISONE 5 MG/1
TABLET ORAL
Qty: 60 TABLET | Refills: 1 | Status: SHIPPED | OUTPATIENT
Start: 2024-01-30 | End: 2024-02-25 | Stop reason: SDUPTHER

## 2024-01-30 ASSESSMENT — ENCOUNTER SYMPTOMS
CONSTITUTIONAL NEGATIVE: 1
HEMATOLOGIC/LYMPHATIC NEGATIVE: 1
NEUROLOGICAL NEGATIVE: 1
PSYCHIATRIC NEGATIVE: 1
EYES NEGATIVE: 1
ALLERGIC/IMMUNOLOGIC NEGATIVE: 1
CARDIOVASCULAR NEGATIVE: 1
ARTHRALGIAS: 1
GASTROINTESTINAL NEGATIVE: 1

## 2024-01-30 ASSESSMENT — PAIN SCALES - GENERAL: PAINLEVEL: 0-NO PAIN

## 2024-01-30 NOTE — PATIENT INSTRUCTIONS
We are increasing the dose of methotrexate to 8 tablets every week  Continue folic acid daily    Decrease prednisone to 7.5 mg daily for 2 weeks then one tablet daily after that    I will see you back in 10-12 weeks

## 2024-01-31 NOTE — PROGRESS NOTES
Subjective   Patient ID: [id number]   Ehsan Dias is a 78 y.o. male who presents for Follow-up.  HPI  Follow up for ICI induced arthritis     Current IS   Prednisone 10 mg daily 2/2023  previously 2.5 -10 mg 8/2022       Recenty started Methotrexate 15mg weekly about 10 weeks ago     RECALL  This is a 77-year-old male who presents to [facility]. Past medical history includes that of hypertension, hyperlipidemia, hypothyroidism, coronary artery disease and most importantly, mesothelioma. For mesothelioma, he follows with oncologist. He was diagnosed on 4/2021, about 1 year ago, he was started on combination of ipilimumab and nivolumab. He developed significant joint pain/inflammatory arthritis 6 months later. Thereafter, ipilimumab was discontinued. He was started on Plaquenil. At the same time, he developed a skin rash and it was not certain whether this was related to Plaquenil or ipilimumab. Irrespective, the Plaquenil was discontinued. He was treated with prednisone and reports that the joint symptoms are well with prednisone 10 mg a day. At his recent follow-up with his rheumatologist, Dr. Segura, it was advised that he consider reducing the dose of prednisone.   Today, patient mentions that he has been taking prednisone 7.5 mg a day and continues to have some discomfort on his MCPs as well as wrist joints. He reports protracted morning stiffness. He does not report much discomfort in other joints including feet, knees, ankles, shoulders or elbows. He does not report much discomfort on neck or back. He does have a history of right shoulder replacement. Patient questions whether it is reasonable to take prednisone 10 mg a day on a regular basis.      Interval Symptoms  - awaiting last dose of PD-1 inhibitor Nivolumab    - Started methotrexate about 10 weeks ago  Noticed 60-70% improvement in his symptoms  Does not hurt as much and stiffness is considerably less but now about 45-60 mins  Not requiring any  NSAIDs  Still taking 10mg prednisone did not taper the dose to 7.5mg  Has residual symptoms notices them more when he's playing the guitar but mainly that is numbness in his hand with prolonged bending of his wrist  Otherwise no swelling of any joints  No extra articular RA symptoms       Review of Systems  Constitutional: Denies fever, chills   Eyes: Denies dry eyes, blurry vision, redness of the eyes, pain in the eyes   ENT: Denies dry mouth, loss of taste, sores in the mouth, or difficulty swallowing   Cardiovascular: Denies chest pain, palpitations   Respiratory: Denies shortness of breath   Gastrointestinal: Denies changes in bowl or bladder function, nausea, vomiting, heartburn   Integumentary: Denies photosensitivity, rash or lesions, Raynaud's   Neurological: Denies any numbness or tingling   Hematologic/Lymphatic: Denies bleeding, alopecia, Raynaud's phenomena   Review of Systems   Constitutional: Negative.    HENT: Negative.     Eyes: Negative.    Cardiovascular: Negative.    Gastrointestinal: Negative.    Genitourinary: Negative.    Musculoskeletal:  Positive for arthralgias.   Skin: Negative.    Allergic/Immunologic: Negative.    Neurological: Negative.    Hematological: Negative.    Psychiatric/Behavioral: Negative.         Objective   Physical Exam  Physical Exam  General: Cooperative, in NAD   Eyes: Conjunctiva clear, sclera white, anicteric   Nose: No external deformity, no mucosal crusting or signs of bleeding   Throat/Mouth: Moist mucous membranes, normal dentition, no ulcers or lesions   Lungs: No respiratory distress; lungs CTAB; no wheezes, rales, rhonchi, or stridor   Heart: Normal S1 and S2; regular rate and rhythm; no murmurs, rubs, or gallops  Skin: No rashes, ulcers, tophi, or nodules noted  MSK:   Spine: Normal posture, no point tenderness to palpation, normal ROM    Upper Extremities:   Hands: tender 2nd+3rd MCPs bilaterally without clinically evident synovitis  Wrists:  Tender wrist -  extensor aspect wo swelling b/l good ROM  Tinnels +ve     Elbows: No erythema, warmth, synovitis, or pain; normal ROM   Shoulders: No erythema, warmth, appreciable swelling, or pain; normal ROM   Lower Extremities:   Hips: No gross deformity, erythema, warmth, or pain; normal ROM   Knees: No erythema, warmth, swelling, or pain; normal ROM   Ankles: No erythema, warmth, synovitis, or pain; normal ROM  Feet: No gross deformity, erythema, or swelling; MTP squeeze negative bilaterally       Assessment/Plan   Diagnoses and all orders for this visit:  Polyarthritis, inflammatory (CMS/HCC)  ICI induced arthritis ( seronegative RA)  Grade 2 iAER  CDAI 13.5 today ( 6 TJC, 0 SJC, PGA 4)  Improvement in his symptoms but ongoing moderate activity based on symptoms and CDAI  Clinically no joint deformities    Inc methotrexate to 20mg weekly  Decrease prednisone to 7.5mg x 2 weeks then 5mg daily thereafter  Will try to taper it further next visit in 10-12 weeks  Has labs every 2 weeks and are reassuring  ESR/CRP were normal previously    We discussed that he would need to continue IST even after cessation of ICI therapy   In future there is possibility of tapering the methotrexate if he remains in remission off prednisone for at least 6 months  Otherwise we may need to add-on other forms of IST along with methotrexate e.g TNFi that wont have an effect on his mesthothelioma  He verbalized understanding    CTS - predominantly left hand  Now mainly while playing the guitar  If symptoms progress will proceed with EMG  No nocturnal symptoms  No weakness on exam today    HM-  UTD with vaccines  Will address shingles + RSV next visit as he would have to hold methotrexate 1 dose after immunization      Case seen and discussed with Dr STEPHANIE Barton MD

## 2024-02-07 ENCOUNTER — INFUSION (OUTPATIENT)
Dept: HEMATOLOGY/ONCOLOGY | Facility: HOSPITAL | Age: 79
End: 2024-02-07
Payer: MEDICARE

## 2024-02-07 VITALS
DIASTOLIC BLOOD PRESSURE: 71 MMHG | SYSTOLIC BLOOD PRESSURE: 134 MMHG | OXYGEN SATURATION: 99 % | TEMPERATURE: 99.1 F | BODY MASS INDEX: 28.57 KG/M2 | HEART RATE: 96 BPM | RESPIRATION RATE: 18 BRPM | WEIGHT: 199.1 LBS

## 2024-02-07 DIAGNOSIS — C45.9 MALIGNANT NEOPLASM OF MESOTHELIAL TISSUE (MULTI): ICD-10-CM

## 2024-02-07 LAB
ALBUMIN SERPL BCP-MCNC: 4.2 G/DL (ref 3.4–5)
ALP SERPL-CCNC: 86 U/L (ref 33–136)
ALT SERPL W P-5'-P-CCNC: 35 U/L (ref 10–52)
ANION GAP SERPL CALC-SCNC: 12 MMOL/L (ref 10–20)
AST SERPL W P-5'-P-CCNC: 25 U/L (ref 9–39)
BASOPHILS # BLD AUTO: 0.03 X10*3/UL (ref 0–0.1)
BASOPHILS NFR BLD AUTO: 0.4 %
BILIRUB SERPL-MCNC: 0.5 MG/DL (ref 0–1.2)
BUN SERPL-MCNC: 17 MG/DL (ref 6–23)
CALCIUM SERPL-MCNC: 9.4 MG/DL (ref 8.6–10.3)
CHLORIDE SERPL-SCNC: 103 MMOL/L (ref 98–107)
CO2 SERPL-SCNC: 29 MMOL/L (ref 21–32)
CREAT SERPL-MCNC: 0.73 MG/DL (ref 0.5–1.3)
EGFRCR SERPLBLD CKD-EPI 2021: >90 ML/MIN/1.73M*2
EOSINOPHIL # BLD AUTO: 0.1 X10*3/UL (ref 0–0.4)
EOSINOPHIL NFR BLD AUTO: 1.2 %
ERYTHROCYTE [DISTWIDTH] IN BLOOD BY AUTOMATED COUNT: 15.8 % (ref 11.5–14.5)
GLUCOSE SERPL-MCNC: 82 MG/DL (ref 74–99)
HCT VFR BLD AUTO: 43.1 % (ref 41–52)
HGB BLD-MCNC: 14.3 G/DL (ref 13.5–17.5)
IMM GRANULOCYTES # BLD AUTO: 0.04 X10*3/UL (ref 0–0.5)
IMM GRANULOCYTES NFR BLD AUTO: 0.5 % (ref 0–0.9)
LYMPHOCYTES # BLD AUTO: 1.17 X10*3/UL (ref 0.8–3)
LYMPHOCYTES NFR BLD AUTO: 14 %
MCH RBC QN AUTO: 31.7 PG (ref 26–34)
MCHC RBC AUTO-ENTMCNC: 33.2 G/DL (ref 32–36)
MCV RBC AUTO: 96 FL (ref 80–100)
MONOCYTES # BLD AUTO: 0.82 X10*3/UL (ref 0.05–0.8)
MONOCYTES NFR BLD AUTO: 9.8 %
NEUTROPHILS # BLD AUTO: 6.22 X10*3/UL (ref 1.6–5.5)
NEUTROPHILS NFR BLD AUTO: 74.1 %
NRBC BLD-RTO: 0 /100 WBCS (ref 0–0)
PLATELET # BLD AUTO: 265 X10*3/UL (ref 150–450)
POTASSIUM SERPL-SCNC: 3.9 MMOL/L (ref 3.5–5.3)
PROT SERPL-MCNC: 6.9 G/DL (ref 6.4–8.2)
RBC # BLD AUTO: 4.51 X10*6/UL (ref 4.5–5.9)
SODIUM SERPL-SCNC: 140 MMOL/L (ref 136–145)
WBC # BLD AUTO: 8.4 X10*3/UL (ref 4.4–11.3)

## 2024-02-07 PROCEDURE — 2500000004 HC RX 250 GENERAL PHARMACY W/ HCPCS (ALT 636 FOR OP/ED): Performed by: CLINICAL NURSE SPECIALIST

## 2024-02-07 PROCEDURE — 96413 CHEMO IV INFUSION 1 HR: CPT

## 2024-02-07 PROCEDURE — 84075 ASSAY ALKALINE PHOSPHATASE: CPT

## 2024-02-07 PROCEDURE — 85025 COMPLETE CBC W/AUTO DIFF WBC: CPT

## 2024-02-07 RX ORDER — PROCHLORPERAZINE MALEATE 10 MG
10 TABLET ORAL EVERY 6 HOURS PRN
Status: DISCONTINUED | OUTPATIENT
Start: 2024-02-07 | End: 2024-02-07 | Stop reason: HOSPADM

## 2024-02-07 RX ORDER — DIPHENHYDRAMINE HYDROCHLORIDE 50 MG/ML
50 INJECTION INTRAMUSCULAR; INTRAVENOUS AS NEEDED
Status: DISCONTINUED | OUTPATIENT
Start: 2024-02-07 | End: 2024-02-07 | Stop reason: HOSPADM

## 2024-02-07 RX ORDER — ALBUTEROL SULFATE 0.83 MG/ML
3 SOLUTION RESPIRATORY (INHALATION) AS NEEDED
Status: DISCONTINUED | OUTPATIENT
Start: 2024-02-07 | End: 2024-02-07 | Stop reason: HOSPADM

## 2024-02-07 RX ORDER — EPINEPHRINE 0.3 MG/.3ML
0.3 INJECTION SUBCUTANEOUS EVERY 5 MIN PRN
Status: DISCONTINUED | OUTPATIENT
Start: 2024-02-07 | End: 2024-02-07 | Stop reason: HOSPADM

## 2024-02-07 RX ORDER — PROCHLORPERAZINE EDISYLATE 5 MG/ML
10 INJECTION INTRAMUSCULAR; INTRAVENOUS EVERY 6 HOURS PRN
Status: DISCONTINUED | OUTPATIENT
Start: 2024-02-07 | End: 2024-02-07 | Stop reason: HOSPADM

## 2024-02-07 RX ORDER — FAMOTIDINE 10 MG/ML
20 INJECTION INTRAVENOUS ONCE AS NEEDED
Status: DISCONTINUED | OUTPATIENT
Start: 2024-02-07 | End: 2024-02-07 | Stop reason: HOSPADM

## 2024-02-07 RX ADMIN — SODIUM CHLORIDE 240 MG: 9 INJECTION, SOLUTION INTRAVENOUS at 14:37

## 2024-02-21 ENCOUNTER — OFFICE VISIT (OUTPATIENT)
Dept: PRIMARY CARE | Facility: CLINIC | Age: 79
End: 2024-02-21
Payer: MEDICARE

## 2024-02-21 VITALS
OXYGEN SATURATION: 97 % | TEMPERATURE: 97.3 F | WEIGHT: 199 LBS | HEART RATE: 94 BPM | HEIGHT: 69 IN | DIASTOLIC BLOOD PRESSURE: 88 MMHG | SYSTOLIC BLOOD PRESSURE: 148 MMHG | BODY MASS INDEX: 29.47 KG/M2 | RESPIRATION RATE: 12 BRPM

## 2024-02-21 DIAGNOSIS — M62.838 TRAPEZIUS MUSCLE SPASM: ICD-10-CM

## 2024-02-21 DIAGNOSIS — M06.4 POLYARTHRITIS, INFLAMMATORY (MULTI): ICD-10-CM

## 2024-02-21 DIAGNOSIS — J01.10 ACUTE NON-RECURRENT FRONTAL SINUSITIS: Primary | ICD-10-CM

## 2024-02-21 PROCEDURE — 1036F TOBACCO NON-USER: CPT | Performed by: FAMILY MEDICINE

## 2024-02-21 PROCEDURE — 3079F DIAST BP 80-89 MM HG: CPT | Performed by: FAMILY MEDICINE

## 2024-02-21 PROCEDURE — 1126F AMNT PAIN NOTED NONE PRSNT: CPT | Performed by: FAMILY MEDICINE

## 2024-02-21 PROCEDURE — 1157F ADVNC CARE PLAN IN RCRD: CPT | Performed by: FAMILY MEDICINE

## 2024-02-21 PROCEDURE — 3077F SYST BP >= 140 MM HG: CPT | Performed by: FAMILY MEDICINE

## 2024-02-21 PROCEDURE — 99213 OFFICE O/P EST LOW 20 MIN: CPT | Performed by: FAMILY MEDICINE

## 2024-02-21 PROCEDURE — 1123F ACP DISCUSS/DSCN MKR DOCD: CPT | Performed by: FAMILY MEDICINE

## 2024-02-21 PROCEDURE — 1159F MED LIST DOCD IN RCRD: CPT | Performed by: FAMILY MEDICINE

## 2024-02-21 RX ORDER — DOXYCYCLINE HYCLATE 100 MG
100 TABLET ORAL 2 TIMES DAILY
Qty: 20 TABLET | Refills: 0 | Status: SHIPPED | OUTPATIENT
Start: 2024-02-21 | End: 2024-03-02

## 2024-02-21 ASSESSMENT — ENCOUNTER SYMPTOMS
NERVOUS/ANXIOUS: 0
CHEST TIGHTNESS: 0
SHORTNESS OF BREATH: 0
ARTHRALGIAS: 0
ABDOMINAL DISTENTION: 0
FEVER: 0
WEAKNESS: 0
NECK PAIN: 1
BLOOD IN STOOL: 0
FATIGUE: 0
CHILLS: 0
COUGH: 1
APPETITE CHANGE: 0
DYSURIA: 0
BACK PAIN: 0
SORE THROAT: 1
ADENOPATHY: 0
ABDOMINAL PAIN: 0
DIARRHEA: 0
RHINORRHEA: 1
EYE PAIN: 0
DYSPHORIC MOOD: 0
DIFFICULTY URINATING: 0
EYE REDNESS: 0
CONSTIPATION: 0
DIZZINESS: 0
HEADACHES: 0
BRUISES/BLEEDS EASILY: 0

## 2024-02-21 NOTE — PROGRESS NOTES
"Subjective   Patient ID: Ehsan Dias is a 78 y.o. male who presents for Cough.  Pt has sinus congestion, PND, ST and cough . Cough is producing mucus. No SOB. No fever, chill or myalgias. Onset was 5 days ago.  Pt reports symptoms are  worsening. No sick contacts. COVID test 2 negative, including this morning.  It occurs with frequency of constant  .   Pt has not tried  for treatment without improvement.   He's taking Prednisone and methotrexate for inflammotory arthritis and has mesothelioma..    Cough  Associated symptoms include postnasal drip, rhinorrhea and a sore throat. Pertinent negatives include no chest pain, chills, eye redness, fever, headaches, rash or shortness of breath.       Review of Systems   Constitutional:  Negative for appetite change, chills, fatigue and fever.   HENT:  Positive for congestion, postnasal drip, rhinorrhea and sore throat. Negative for hearing loss.    Eyes:  Negative for pain, redness and visual disturbance.   Respiratory:  Positive for cough. Negative for chest tightness and shortness of breath.    Cardiovascular:  Negative for chest pain and leg swelling.   Gastrointestinal:  Negative for abdominal distention, abdominal pain, blood in stool, constipation and diarrhea.   Genitourinary:  Negative for difficulty urinating and dysuria.   Musculoskeletal:  Positive for neck pain (R trap pain occurs a couple times yearly). Negative for arthralgias and back pain.   Skin:  Negative for rash.   Neurological:  Negative for dizziness, weakness and headaches.   Hematological:  Negative for adenopathy. Does not bruise/bleed easily.   Psychiatric/Behavioral:  Negative for dysphoric mood. The patient is not nervous/anxious.        Objective   /88   Pulse 94   Temp 36.3 °C (97.3 °F)   Resp 12   Ht 1.753 m (5' 9\")   Wt 90.3 kg (199 lb)   SpO2 97%   BMI 29.39 kg/m²    Physical Exam  Constitutional:       General: He is not in acute distress.     Appearance: Normal " appearance. He is not ill-appearing.   HENT:      Head: Normocephalic and atraumatic.      Comments: Tender over b/l frontal sinuses     Right Ear: Tympanic membrane, ear canal and external ear normal.      Left Ear: Tympanic membrane, ear canal and external ear normal.      Nose: Congestion and rhinorrhea present.      Mouth/Throat:      Mouth: Mucous membranes are moist.      Pharynx: Posterior oropharyngeal erythema present. No oropharyngeal exudate.   Eyes:      Conjunctiva/sclera: Conjunctivae normal.   Neck:      Vascular: No carotid bruit.   Cardiovascular:      Rate and Rhythm: Normal rate and regular rhythm.      Heart sounds: Normal heart sounds. No murmur heard.  Pulmonary:      Breath sounds: Normal breath sounds. No wheezing, rhonchi or rales.   Abdominal:      General: Bowel sounds are normal. There is no distension.      Palpations: Abdomen is soft. There is no mass.      Tenderness: There is no abdominal tenderness.   Musculoskeletal:         General: No swelling or deformity.      Cervical back: Neck supple. No tenderness.   Lymphadenopathy:      Cervical: No cervical adenopathy.   Skin:     General: Skin is warm and dry.      Findings: No rash.   Neurological:      Mental Status: He is alert.      Motor: No weakness.   Psychiatric:         Mood and Affect: Mood normal.         Behavior: Behavior normal.         Judgment: Judgment normal.       Assessment/Plan   Diagnoses and all orders for this visit:  Acute non-recurrent frontal sinusitis - with current meds/medical conditions and worseing of symptoms will treat with Doxy 10days. May also try Robitussin and Sinus Rinse as discussed.  Polyarthritis, inflammatory - not as well controlled since cutting back Pred, should notify Rheumatology  Trapezius muscle spasm- discussed hydration, topical pain reliever, heat and Tylenol as needed.    Follow up as planned

## 2024-02-21 NOTE — PROGRESS NOTES
"Subjective   Patient ID: Ehsan Dias is a 78 y.o. male who presents for Cough.    HPI     Review of Systems    Objective   /88   Pulse 94   Temp 36.3 °C (97.3 °F)   Resp 12   Ht 1.753 m (5' 9\")   Wt 90.3 kg (199 lb)   SpO2 97%   BMI 29.39 kg/m²     Physical Exam    Assessment/Plan          "

## 2024-02-25 ENCOUNTER — HOSPITAL ENCOUNTER (EMERGENCY)
Facility: HOSPITAL | Age: 79
Discharge: HOME | End: 2024-02-25
Attending: STUDENT IN AN ORGANIZED HEALTH CARE EDUCATION/TRAINING PROGRAM
Payer: MEDICARE

## 2024-02-25 ENCOUNTER — APPOINTMENT (OUTPATIENT)
Dept: RADIOLOGY | Facility: HOSPITAL | Age: 79
End: 2024-02-25
Payer: MEDICARE

## 2024-02-25 VITALS
DIASTOLIC BLOOD PRESSURE: 80 MMHG | RESPIRATION RATE: 20 BRPM | WEIGHT: 199 LBS | HEART RATE: 90 BPM | HEIGHT: 70 IN | TEMPERATURE: 97.9 F | BODY MASS INDEX: 28.49 KG/M2 | OXYGEN SATURATION: 96 % | SYSTOLIC BLOOD PRESSURE: 138 MMHG

## 2024-02-25 DIAGNOSIS — B37.0 THRUSH: ICD-10-CM

## 2024-02-25 DIAGNOSIS — M54.2 NECK PAIN: Primary | ICD-10-CM

## 2024-02-25 DIAGNOSIS — M06.4 POLYARTHRITIS, INFLAMMATORY (MULTI): ICD-10-CM

## 2024-02-25 LAB
ALBUMIN SERPL BCP-MCNC: 4.2 G/DL (ref 3.4–5)
ALP SERPL-CCNC: 87 U/L (ref 33–136)
ALT SERPL W P-5'-P-CCNC: 27 U/L (ref 10–52)
ANION GAP SERPL CALC-SCNC: 14 MMOL/L (ref 10–20)
AST SERPL W P-5'-P-CCNC: 22 U/L (ref 9–39)
BASOPHILS # BLD AUTO: 0.06 X10*3/UL (ref 0–0.1)
BASOPHILS NFR BLD AUTO: 0.6 %
BILIRUB SERPL-MCNC: 1.1 MG/DL (ref 0–1.2)
BUN SERPL-MCNC: 15 MG/DL (ref 6–23)
CALCIUM SERPL-MCNC: 9.8 MG/DL (ref 8.6–10.3)
CHLORIDE SERPL-SCNC: 100 MMOL/L (ref 98–107)
CO2 SERPL-SCNC: 27 MMOL/L (ref 21–32)
CREAT SERPL-MCNC: 0.88 MG/DL (ref 0.5–1.3)
EGFRCR SERPLBLD CKD-EPI 2021: 87 ML/MIN/1.73M*2
EOSINOPHIL # BLD AUTO: 0.09 X10*3/UL (ref 0–0.4)
EOSINOPHIL NFR BLD AUTO: 0.9 %
ERYTHROCYTE [DISTWIDTH] IN BLOOD BY AUTOMATED COUNT: 15.3 % (ref 11.5–14.5)
FLUAV RNA RESP QL NAA+PROBE: NOT DETECTED
FLUBV RNA RESP QL NAA+PROBE: NOT DETECTED
GLUCOSE SERPL-MCNC: 88 MG/DL (ref 74–99)
HCT VFR BLD AUTO: 44 % (ref 41–52)
HGB BLD-MCNC: 14.8 G/DL (ref 13.5–17.5)
IMM GRANULOCYTES # BLD AUTO: 0.04 X10*3/UL (ref 0–0.5)
IMM GRANULOCYTES NFR BLD AUTO: 0.4 % (ref 0–0.9)
LYMPHOCYTES # BLD AUTO: 1.25 X10*3/UL (ref 0.8–3)
LYMPHOCYTES NFR BLD AUTO: 12.9 %
MAGNESIUM SERPL-MCNC: 1.76 MG/DL (ref 1.6–2.4)
MCH RBC QN AUTO: 32.5 PG (ref 26–34)
MCHC RBC AUTO-ENTMCNC: 33.6 G/DL (ref 32–36)
MCV RBC AUTO: 97 FL (ref 80–100)
MONOCYTES # BLD AUTO: 1.04 X10*3/UL (ref 0.05–0.8)
MONOCYTES NFR BLD AUTO: 10.7 %
NEUTROPHILS # BLD AUTO: 7.2 X10*3/UL (ref 1.6–5.5)
NEUTROPHILS NFR BLD AUTO: 74.5 %
NRBC BLD-RTO: 0 /100 WBCS (ref 0–0)
PLATELET # BLD AUTO: 319 X10*3/UL (ref 150–450)
POTASSIUM SERPL-SCNC: 3.8 MMOL/L (ref 3.5–5.3)
PROT SERPL-MCNC: 7.3 G/DL (ref 6.4–8.2)
RBC # BLD AUTO: 4.55 X10*6/UL (ref 4.5–5.9)
S PYO DNA THROAT QL NAA+PROBE: NOT DETECTED
SARS-COV-2 RNA RESP QL NAA+PROBE: NOT DETECTED
SODIUM SERPL-SCNC: 137 MMOL/L (ref 136–145)
WBC # BLD AUTO: 9.7 X10*3/UL (ref 4.4–11.3)

## 2024-02-25 PROCEDURE — 70491 CT SOFT TISSUE NECK W/DYE: CPT | Performed by: RADIOLOGY

## 2024-02-25 PROCEDURE — 36415 COLL VENOUS BLD VENIPUNCTURE: CPT | Performed by: STUDENT IN AN ORGANIZED HEALTH CARE EDUCATION/TRAINING PROGRAM

## 2024-02-25 PROCEDURE — 2500000004 HC RX 250 GENERAL PHARMACY W/ HCPCS (ALT 636 FOR OP/ED): Performed by: STUDENT IN AN ORGANIZED HEALTH CARE EDUCATION/TRAINING PROGRAM

## 2024-02-25 PROCEDURE — 70491 CT SOFT TISSUE NECK W/DYE: CPT

## 2024-02-25 PROCEDURE — 87651 STREP A DNA AMP PROBE: CPT | Performed by: STUDENT IN AN ORGANIZED HEALTH CARE EDUCATION/TRAINING PROGRAM

## 2024-02-25 PROCEDURE — 2550000001 HC RX 255 CONTRASTS: Performed by: STUDENT IN AN ORGANIZED HEALTH CARE EDUCATION/TRAINING PROGRAM

## 2024-02-25 PROCEDURE — 87081 CULTURE SCREEN ONLY: CPT | Mod: PARLAB | Performed by: STUDENT IN AN ORGANIZED HEALTH CARE EDUCATION/TRAINING PROGRAM

## 2024-02-25 PROCEDURE — 96361 HYDRATE IV INFUSION ADD-ON: CPT

## 2024-02-25 PROCEDURE — 80053 COMPREHEN METABOLIC PANEL: CPT | Performed by: STUDENT IN AN ORGANIZED HEALTH CARE EDUCATION/TRAINING PROGRAM

## 2024-02-25 PROCEDURE — 83735 ASSAY OF MAGNESIUM: CPT | Performed by: STUDENT IN AN ORGANIZED HEALTH CARE EDUCATION/TRAINING PROGRAM

## 2024-02-25 PROCEDURE — 99284 EMERGENCY DEPT VISIT MOD MDM: CPT | Mod: 25

## 2024-02-25 PROCEDURE — 85025 COMPLETE CBC W/AUTO DIFF WBC: CPT | Performed by: STUDENT IN AN ORGANIZED HEALTH CARE EDUCATION/TRAINING PROGRAM

## 2024-02-25 PROCEDURE — 96375 TX/PRO/DX INJ NEW DRUG ADDON: CPT | Mod: 59

## 2024-02-25 PROCEDURE — 96374 THER/PROPH/DIAG INJ IV PUSH: CPT | Mod: 59

## 2024-02-25 PROCEDURE — 87502 INFLUENZA DNA AMP PROBE: CPT | Performed by: STUDENT IN AN ORGANIZED HEALTH CARE EDUCATION/TRAINING PROGRAM

## 2024-02-25 RX ORDER — KETOROLAC TROMETHAMINE 30 MG/ML
15 INJECTION, SOLUTION INTRAMUSCULAR; INTRAVENOUS ONCE
Status: COMPLETED | OUTPATIENT
Start: 2024-02-25 | End: 2024-02-25

## 2024-02-25 RX ORDER — CHLORHEXIDINE GLUCONATE ORAL RINSE 1.2 MG/ML
15 SOLUTION DENTAL AS NEEDED
Qty: 120 ML | Refills: 0 | Status: SHIPPED | OUTPATIENT
Start: 2024-02-25 | End: 2024-03-10

## 2024-02-25 RX ORDER — KETOROLAC TROMETHAMINE 10 MG/1
10 TABLET, FILM COATED ORAL EVERY 8 HOURS PRN
Qty: 10 TABLET | Refills: 0 | Status: SHIPPED | OUTPATIENT
Start: 2024-02-25 | End: 2024-03-31

## 2024-02-25 RX ORDER — PREDNISONE 10 MG/1
10 TABLET ORAL DAILY
Qty: 75 TABLET | Refills: 0 | Status: SHIPPED | OUTPATIENT
Start: 2024-02-25 | End: 2024-05-15 | Stop reason: WASHOUT

## 2024-02-25 RX ORDER — DEXAMETHASONE SODIUM PHOSPHATE 10 MG/ML
10 INJECTION INTRAMUSCULAR; INTRAVENOUS ONCE
Status: COMPLETED | OUTPATIENT
Start: 2024-02-25 | End: 2024-02-25

## 2024-02-25 RX ORDER — NYSTATIN 100000 [USP'U]/ML
4 SUSPENSION ORAL 4 TIMES DAILY
Qty: 100 ML | Refills: 0 | Status: SHIPPED | OUTPATIENT
Start: 2024-02-25 | End: 2024-03-01

## 2024-02-25 RX ORDER — PREDNISONE 5 MG/1
TABLET ORAL
Qty: 60 TABLET | Refills: 0 | Status: SHIPPED | OUTPATIENT
Start: 2024-02-25

## 2024-02-25 RX ADMIN — SODIUM CHLORIDE, POTASSIUM CHLORIDE, SODIUM LACTATE AND CALCIUM CHLORIDE 1000 ML: 600; 310; 30; 20 INJECTION, SOLUTION INTRAVENOUS at 09:11

## 2024-02-25 RX ADMIN — KETOROLAC TROMETHAMINE 15 MG: 30 INJECTION, SOLUTION INTRAMUSCULAR; INTRAVENOUS at 09:12

## 2024-02-25 RX ADMIN — DEXAMETHASONE SODIUM PHOSPHATE 10 MG: 10 INJECTION, SOLUTION INTRAMUSCULAR; INTRAVENOUS at 11:53

## 2024-02-25 RX ADMIN — IOHEXOL 75 ML: 350 INJECTION, SOLUTION INTRAVENOUS at 10:35

## 2024-02-25 ASSESSMENT — PAIN DESCRIPTION - DESCRIPTORS: DESCRIPTORS: ACHING

## 2024-02-25 ASSESSMENT — PAIN SCALES - GENERAL
PAINLEVEL_OUTOF10: 2
PAINLEVEL_OUTOF10: 9
PAINLEVEL_OUTOF10: 0 - NO PAIN
PAINLEVEL_OUTOF10: 10 - WORST POSSIBLE PAIN

## 2024-02-25 ASSESSMENT — PAIN DESCRIPTION - FREQUENCY: FREQUENCY: CONSTANT/CONTINUOUS

## 2024-02-25 ASSESSMENT — PAIN DESCRIPTION - LOCATION
LOCATION: THROAT
LOCATION: THROAT
LOCATION: OTHER (COMMENT)

## 2024-02-25 ASSESSMENT — PAIN DESCRIPTION - ONSET: ONSET: ONGOING

## 2024-02-25 ASSESSMENT — COLUMBIA-SUICIDE SEVERITY RATING SCALE - C-SSRS
2. HAVE YOU ACTUALLY HAD ANY THOUGHTS OF KILLING YOURSELF?: NO
6. HAVE YOU EVER DONE ANYTHING, STARTED TO DO ANYTHING, OR PREPARED TO DO ANYTHING TO END YOUR LIFE?: NO
1. IN THE PAST MONTH, HAVE YOU WISHED YOU WERE DEAD OR WISHED YOU COULD GO TO SLEEP AND NOT WAKE UP?: NO

## 2024-02-25 ASSESSMENT — PAIN - FUNCTIONAL ASSESSMENT
PAIN_FUNCTIONAL_ASSESSMENT: 0-10

## 2024-02-25 ASSESSMENT — PAIN DESCRIPTION - PROGRESSION: CLINICAL_PROGRESSION: GRADUALLY WORSENING

## 2024-02-25 NOTE — ED PROVIDER NOTES
HPI   Chief Complaint   Patient presents with    Oral Swelling     Pt arrived by PV c/o pain and swelling to tongue that he can hardly move, makes eating, talking, swallowing 10/10 pain - Pt's lips, gums, inner cheeks, roof of mouth, and throat all hurt as well but aren't as swollen - Pt had had sore throat & runny nose all week - PCP diagnosed pt with sinusitis Wed and placed him on Doxycycline - Friday through Sat it became worse and when his tongue started to swell       HPI     Patient is a 78-year-old male with a past medical history of hypertension, malignant mesothelioma status post immunotherapy, hyperlipidemia and history of Ram-Rito syndrome presenting for oral pain.  He was seen on Wednesday in his primary care doctor's appointment for sinus discomfort and was started on doxycycline.  Since being started on that medication he states it has not improved and in fact he feels a lot of pain.  It hurts primarily in his throat, hurts around his tongue and hurts to swallow.  He has not noticed any swelling.  He states some pain more on the right side than the left side.  Denies any fevers.  In terms of his malignant mesothelioma history he was on a course of immunotherapy his last course was 3 weeks ago.               Malina Coma Scale Score: 15                     Patient History   Past Medical History:   Diagnosis Date    Abdominal cramping 02/27/2023    Anemia due to blood loss 09/15/2023    Atherosclerosis of aorta (CMS/HCC) 10/10/2023    CAD (coronary artery disease) 02/27/2023    Inferior wall MI, s/p FROY x2 to RCA. 70% on revascularized LAD. 2001 [facility].    Depression 09/15/2023    Edema, lower extremity 09/15/2023    Gender identity uncertainty in adult 09/15/2023    Hypercholesterolemia 02/27/2023    Dr. David Quinn    Hypertension     Hypertension 02/27/2023    Insomnia 02/27/2023    Ischemic cardiomyopathy 09/15/2023    EF 50% on echo of 9/7/2022    Malignant neoplasm of mesothelial tissue  (CMS/Formerly [facility]) 02/27/2023    Other fatigue 09/15/2023    Personal history of diseases of the blood and blood-forming organs and certain disorders involving the immune mechanism 07/20/2017    History of thrombocytosis    Personal history of malignant neoplasm of soft tissue 11/18/2021    History of malignant mesothelioma    Personal history of other diseases of male genital organs 11/11/2019    History of impotence    Personal history of transient ischemic attack (TIA), and cerebral infarction without residual deficits 12/14/2021    History of transient ischemic attack    Pleural mass 02/27/2023    Recurrent major depressive disorder, in partial remission (CMS/[facility]ly [facility]) 02/27/2023    Secondary malignant neoplasm of retroperitoneum and peritoneum (CMS/[facility]ly [facility]) 10/10/2023    Skin irritation 09/15/2023    SOB (shortness of breath) on exertion 09/15/2023    Ram-Rito syndrome (CMS/Formerly [facility]) 09/15/2023    Viremia 09/15/2023    Xerosis cutis 11/15/2022     Past Surgical History:   Procedure Laterality Date    LUNG DECORTICATION Right     OTHER SURGICAL HISTORY  05/26/2021    Pleurectomy with decortication    OTHER SURGICAL HISTORY  05/26/2021    Cardiac catheterization with stent placement    OTHER SURGICAL HISTORY  05/26/2021    Thoracentesis    OTHER SURGICAL HISTORY  04/07/2021    Bronchoscopy    OTHER SURGICAL HISTORY  04/07/2021    Pleural biopsy    SHOULDER SURGERY Right 02/09/2015    Shoulder Surgery    US GUIDED ABDOMINAL PARACENTESIS  02/18/2022    US GUIDED ABDOMINAL PARACENTESIS 2[place]18/2022 [facility]    US GUIDED ABDOMINAL PARACENTESIS  03/11/2022    US GUIDED ABDOMINAL PARACENTESIS 3[place]11/2022 [facility] [place] [facility]    US GUIDED ABDOMINAL PARACENTESIS  03/22/2022    US GUIDED ABDOMINAL PARACENTESIS 3/22/2022 [facility]    US GUIDED ABDOMINAL PARACENTESIS  04/01/2022    US GUIDED ABDOMINAL PARACENTESIS 4/1/2022 [facility]     Family History   Problem Relation Name Age of Onset    Heart disease Mother      Other (cardiac  disorder) Mother      Heart disease Father      Other (cardiac disorder) Father      Coronary artery disease Brother       Social History     Tobacco Use    Smoking status: Former     Types: Cigarettes     Quit date: 1969     Years since quittin.1     Passive exposure: Past    Smokeless tobacco: Never   Substance Use Topics    Alcohol use: Not Currently    Drug use: Not Currently       Physical Exam   ED Triage Vitals [24 0823]   Temperature Heart Rate Respirations BP   36.6 °C (97.9 °F) (!) 105 16 133/81      Pulse Ox Temp Source Heart Rate Source Patient Position   94 % Oral Monitor Sitting      BP Location FiO2 (%)     Right arm --       Physical Exam  Vitals and nursing note reviewed.   Constitutional:       General: He is not in acute distress.     Appearance: He is well-developed.   HENT:      Head: Normocephalic and atraumatic.      Jaw: Tenderness present. No trismus or swelling.      Salivary Glands: Right salivary gland is tender.   Eyes:      Conjunctiva/sclera: Conjunctivae normal.   Cardiovascular:      Rate and Rhythm: Regular rhythm. Tachycardia present.      Heart sounds: No murmur heard.  Pulmonary:      Effort: Pulmonary effort is normal. No respiratory distress.      Breath sounds: Normal breath sounds.   Abdominal:      Palpations: Abdomen is soft.      Tenderness: There is no abdominal tenderness.   Musculoskeletal:         General: No swelling.      Cervical back: Neck supple.   Skin:     General: Skin is warm and dry.      Capillary Refill: Capillary refill takes less than 2 seconds.   Neurological:      Mental Status: He is alert.   Psychiatric:         Mood and Affect: Mood normal.         ED Course & MDM   Diagnoses as of 24 1404   Neck pain   Thrush       Medical Decision Making  78-year-old male presenting as above on arrival here hemodynamically tachycardic otherwise stable.  On bedside assessment he is conversant but with ,ildly muffled speech.  His tongue is flat,  there is no evidence of angioedema I am able to with a tongue depressor visualize his posterior oropharynx his uvula is flat and appropriate.  He is status post tonsillectomy, no visualized swelling.  He has no trismus but given his muffled voice concern for possible retropharyngeal process or some deep space infection I am unable to visualize will get a CT of the neck for further evaluation.  I have lower suspicion for an allergic reaction given lack of urticaria, nausea, vomiting, diarrhea or othe allergic reaction symptoms he is no wheezing, lung symmetric and clear.  The chronicity of symptoms as well as more reassuring as he has had symptoms beginning since Friday evening.    Labs wnl, CT without abscess. Discussed and offered NP scope to visualize posterior oropharynx however patient feels significantly improved. He has been stable on multiple assessments, resting comfortably flat and sleeping at times without any airway difficulty. I did discuss a course of steroids and toradol and peridex and nystatin for possible thrust as well as outpatient f/u with ENT if not improving. They are in agreement with this plan and discharged home.    Procedure  Procedures     Sabine Calhoun MD  02/27/24 [id number]

## 2024-02-28 LAB — S PYO THROAT QL CULT: NORMAL

## 2024-03-04 ENCOUNTER — TELEPHONE (OUTPATIENT)
Dept: PRIMARY CARE | Facility: CLINIC | Age: 79
End: 2024-03-04
Payer: MEDICARE

## 2024-03-04 NOTE — TELEPHONE ENCOUNTER
Pt called and left message wanting to schedule an apt with ENT. I called him back and gave him his name and number.

## 2024-03-06 ENCOUNTER — OFFICE VISIT (OUTPATIENT)
Dept: HEMATOLOGY/ONCOLOGY | Facility: HOSPITAL | Age: 79
End: 2024-03-06
Payer: MEDICARE

## 2024-03-06 VITALS
DIASTOLIC BLOOD PRESSURE: 70 MMHG | OXYGEN SATURATION: 100 % | SYSTOLIC BLOOD PRESSURE: 140 MMHG | BODY MASS INDEX: 27.97 KG/M2 | HEART RATE: 62 BPM | TEMPERATURE: 96.4 F | WEIGHT: 194.9 LBS | RESPIRATION RATE: 17 BRPM

## 2024-03-06 DIAGNOSIS — C45.9 MALIGNANT NEOPLASM OF MESOTHELIAL TISSUE (MULTI): ICD-10-CM

## 2024-03-06 PROCEDURE — 99215 OFFICE O/P EST HI 40 MIN: CPT | Performed by: INTERNAL MEDICINE

## 2024-03-06 PROCEDURE — 1125F AMNT PAIN NOTED PAIN PRSNT: CPT | Performed by: INTERNAL MEDICINE

## 2024-03-06 PROCEDURE — 1036F TOBACCO NON-USER: CPT | Performed by: INTERNAL MEDICINE

## 2024-03-06 PROCEDURE — 1159F MED LIST DOCD IN RCRD: CPT | Performed by: INTERNAL MEDICINE

## 2024-03-06 PROCEDURE — 3078F DIAST BP <80 MM HG: CPT | Performed by: INTERNAL MEDICINE

## 2024-03-06 PROCEDURE — 1123F ACP DISCUSS/DSCN MKR DOCD: CPT | Performed by: INTERNAL MEDICINE

## 2024-03-06 PROCEDURE — 1157F ADVNC CARE PLAN IN RCRD: CPT | Performed by: INTERNAL MEDICINE

## 2024-03-06 PROCEDURE — 3077F SYST BP >= 140 MM HG: CPT | Performed by: INTERNAL MEDICINE

## 2024-03-06 ASSESSMENT — ENCOUNTER SYMPTOMS
CHILLS: 0
VISUAL CHANGE: 0
DIAPHORESIS: 0
CHANGE IN BOWEL HABIT: 0
FATIGUE: 0
SWOLLEN GLANDS: 0
COUGH: 0
FEVER: 0
VOMITING: 0
MYALGIAS: 0
SORE THROAT: 1
NUMBNESS: 0
HEADACHES: 0
WEAKNESS: 0
VERTIGO: 0
NAUSEA: 0
ARTHRALGIAS: 0
ANOREXIA: 0
NECK PAIN: 0
ABDOMINAL PAIN: 0
JOINT SWELLING: 0

## 2024-03-06 ASSESSMENT — PAIN SCALES - GENERAL: PAINLEVEL: 2

## 2024-03-06 NOTE — PROGRESS NOTES
Patient ID: Ehsan Dias is a 78 y.o. male.    DIAGNOSIS     Malignant right pleural mesothelioma- epithelioid type.  Date of diagnosis: 4/2/2021 from VATS guided pleural mass biopsy the neoplasm shows solid and papillary growth patterns. IHC of  tumor  cells were POSITIVE for calretinin, WT-1, CK5/6 and D2-40 (weak, focal), and NEGATIVE for CEAm, TTF-1, B72.3 and MOC31,        STAGING     Initial T2N0M0  Progression in chest and peritoneum in January 2022        CURRENT SITES OF DISEASE     pleura, mediastinum, peritoneum        MOLECULAR GENOMICS     MUTYH 48.9%  BAP1 17.5%   Tumor Mutational [place] (TMB): 5.8 m/MB Microsatellite Instability (MSI) Status: Stable           SERUM TUMOR MARKER     Not applicable        PRIOR THERAPY     1- Pleurectomy May 7th , 2021   2- 02.23.2022 : Started on Ipilimumab and Nivolumab. Documented clinical and radiographic response.  Ipilimumab dropped and nivolumab alone was continued starting December 21, 2022 given toxicities of the combination.  Last treated February 2024, completed 2 years of treatment       CURRENT THERAPY     Observation        CURRENT ONCOLOGICAL PROBLEMS     1/ R sided pleuritic chest pain , Improving  2. weight loss- in history it seems like this is intentional (need additional data points in future follow ups) - Gaining weight post surgery  3. dry cough - Significantly improved  4- Polypoid lesion seen on CT scan of the chest within the distal esophagus, referred for endoscopy.,  September 2021  5-  ascites starting Jan 2022.  Paracentesis performed in February suggest malignant ascites from mesothelioma.  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  Seen by rheumatology August 2022, prescribed Celebrex. 1/18/23 - Prednisone taper 10 mg to current 7.5 mg.  Down to prednisone 5 mg with initiation of methotrexate  in July 2023.  Patient notified me on August 23, 2023 that he is not taking his methotrexate and he is fearful of the side effects.  Continues to have polyarthritis symptoms.  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  Due to see ENT.       HISTORY OF PRESENT ILLNESS      His initial presentation was in July of 2018 when he presented with cc of  persistent fever, right sided pleuritic chest pain for a month. He was seen by Dr. Catie Harvey in Pulmonary clinic, routine imaging showed right side pleural effusion.  Diagnostic thoracentesis was performed on 7/13 that showed exudative effusion with neutrophilic predominance, cultures negative, cytology negative for carcinoma.   He completed course oral antibiotics and his pain and effusion resolved.  Again, had recurrence of pleuritic chest pain  and pleural effusion   later in 12/2020. The effusion was found to be worse on repeat chest imaging ( CT Chest in 2/2021). Diagnostic and therapeutic thoracentesis was done which showed exudative effusion with lymphocyte predominance. Given his recurrent ipsilateral moderate  size pleural effusion and near complete collapse of the right lower lobe and mild right middle lobe atelectasis on CT chest- he was referred to CT surgeon and a PET CT was also obtained.      3/2021: PET CT revealed right-sided pleural effusion with multiple hypermetabolic  areas of pleural nodularity visualized along the  pleural surface of the right lung, which predominantly involve the right lung base. In addition, there were multiple FDG avid mediastinal lymph nodes including enlarged subcarinal lymph nodes (maximum SUV  of 4.4), paratracheal lymph nodes (maximum SUV of 3.1), and right hilar  lymph nodes (maximum SUV of 2.9).     3/23/21: was seen by Dr. Dannielle Junior from CT surgery and was scheduled to get R VATS pleural biopsy on 4/2/21: thorascopic exploration showed some adhesions at the diaphragm to the chest wall and also  to the lung.        PAST MEDICAL HISTORY     CAD with MI s/p PCI to RCA with 2 stents,   OA R shoulder s/p replacement,   HTN   elevated PSA with FDG avidity in post lobe of prostate- followed by Dr. 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   former smoker, quit 1969, 2 packs x 8 year  = 16 pack years   smoked marijuana since 1970s; quit few months ago  Possible exposure to asbestos when worked in the [employer].          CURRENT MEDS     see med list         ALLERGIES     nkda         FAMILY HISTORY     no family h/o mesothelioma     Subjective    Cancer  Associated symptoms include a sore throat. Pertinent negatives include no abdominal pain, anorexia, arthralgias, change in bowel habit, chest pain, chills, congestion, coughing, diaphoresis, fatigue, fever, headaches, joint swelling, myalgias, nausea, neck pain, numbness, rash, swollen glands, urinary symptoms, vertigo, visual change, vomiting or weakness.     Patient completed his last immunotherapy several weeks ago in early February 2024.  He comes to the emergency room on February 25 with mouth pain after starting doxycycline for sinusitis.  There was some swelling in his mouth and the pain was severe and he could not open or close his mouth very well.  This has significantly improved but the symptoms remain including a sensitive nodule he  feels at the web underneath his tongue.  Otherwise denies shortness of breath cough hemoptysis.  His overall joint symptoms have improved since starting methotrexate.  No fever.    Objective    BSA: 2.09 meters squared  /70   Pulse 62   Temp 35.8 °C (96.4 °F) (Skin)   Resp 17   Wt 88.4 kg (194 lb 14.4 oz)   SpO2 100%   BMI 27.97 kg/m²      Physical Exam  Constitutional:       General: He is not in acute distress.     Appearance: He is not ill-appearing, toxic-appearing or diaphoretic.   HENT:      Mouth/Throat:      Pharynx: Posterior oropharyngeal erythema present. No oropharyngeal exudate.      Comments: Small purple sized nodule underneath the tongue  Eyes:      General: No scleral icterus.     Conjunctiva/sclera: Conjunctivae normal.   Cardiovascular:      Rate and Rhythm: Normal rate and regular rhythm.      Pulses: Normal pulses.      Heart sounds: Normal heart sounds. No murmur heard.     No friction rub. No gallop.   Pulmonary:      Effort: Pulmonary effort is normal. No respiratory distress.      Breath sounds: Normal breath sounds. No stridor. No wheezing, rhonchi or rales.   Chest:      Chest wall: No tenderness.   Abdominal:      General: Abdomen is flat. Bowel sounds are normal. There is no distension.      Palpations: Abdomen is soft. There is no mass.      Tenderness: There is no abdominal tenderness. There is no guarding or rebound.   Musculoskeletal:      Cervical back: No tenderness.   Lymphadenopathy:      Cervical: No cervical adenopathy.         Performance Status:  Symptomatic; fully ambulatory      Assessment/Plan     This is a very nice gentleman with epithelioid mesothelioma diagnosed 3 years ago in April 2021.  Due to disease progression he was started on immunotherapy in February 2022 and completed immunotherapy in February 2024, to 4 years, with a complete radiographic response.  He has had some immune related adverse events are described above.  Will see him back in 2 months  time with repeat imaging studies.  He has developed some mouth pain and swelling.  He is due to see ENT tomorrow.      Cancer Staging   No matching staging information was found for the patient.    Oncology History   Malignant neoplasm of mesothelial tissue (CMS/HCC)   2/27/2023 Initial Diagnosis    Malignant neoplasm of mesothelial tissue (CMS/HCC)     10/4/2023 -  Chemotherapy    Nivolumab 240 mg (Biweekly), 28 Day Cycles         Shawn Blum MD  Professor of Medicine and Oncology  [facility]  Sejal Reese Chair in Lung Cancer  Thoracic Oncology  [facility]

## 2024-03-07 ENCOUNTER — TELEPHONE (OUTPATIENT)
Dept: RHEUMATOLOGY | Facility: CLINIC | Age: 79
End: 2024-03-07

## 2024-03-07 NOTE — TELEPHONE ENCOUNTER
Patient is updating his medical condition... he was in the ER and now he is on a new Prednisone dosage that started on Feb 26th and he'd like to discuss. Please advise.

## 2024-03-20 DIAGNOSIS — S61.039A: Primary | ICD-10-CM

## 2024-03-20 RX ORDER — MUPIROCIN 20 MG/G
OINTMENT TOPICAL 3 TIMES DAILY
Qty: 22 G | Refills: 0 | Status: SHIPPED | OUTPATIENT
Start: 2024-03-20 | End: 2024-03-30

## 2024-04-05 ENCOUNTER — LAB (OUTPATIENT)
Dept: LAB | Facility: LAB | Age: 79
End: 2024-04-05
Payer: MEDICARE

## 2024-04-05 DIAGNOSIS — E78.00 HYPERCHOLESTEROLEMIA: ICD-10-CM

## 2024-04-05 LAB
CHOLEST SERPL-MCNC: 122 MG/DL (ref 0–199)
CHOLESTEROL/HDL RATIO: 3.7
HDLC SERPL-MCNC: 33 MG/DL
LDLC SERPL CALC-MCNC: 53 MG/DL
NON HDL CHOLESTEROL: 89 MG/DL (ref 0–149)
TRIGL SERPL-MCNC: 181 MG/DL (ref 0–149)
VLDL: 36 MG/DL (ref 0–40)

## 2024-04-05 PROCEDURE — 80061 LIPID PANEL: CPT

## 2024-04-05 PROCEDURE — 36415 COLL VENOUS BLD VENIPUNCTURE: CPT

## 2024-04-15 NOTE — PROGRESS NOTES
"Subjective   Ehsan Dias  is a 78 y.o. male who presents for had no chief complaint listed for this encounter.    Currently the patient is {Usual state of health:21582}. He {T report deny:38094}. {WILDorBLANK:82278::\" \"}    {CWT [facility] stuff:59723}    He  reports that he quit smoking about 55 years ago. His smoking use included cigarettes. He has been exposed to tobacco smoke. He has never used smokeless tobacco. He reports that he does not currently use alcohol. He reports that he does not currently use drugs.    Objective   Physical Exam  {CWT exam:71987::\" \"}  Diagnostic Studies  {CWT reviewed:73195}    Assessment/Plan   Overall, I believe that the patient {CWT doing well:71204}.     {CWT plan smart text:06895}    I recommend {CWTworkup:43906}    I discussed this in detail with the patient, including a discussion of alternatives. They were comfortable with this approach.     Juan Michelle MD  [phone]      "

## 2024-04-20 DIAGNOSIS — I10 PRIMARY HYPERTENSION: ICD-10-CM

## 2024-04-23 ENCOUNTER — OFFICE VISIT (OUTPATIENT)
Dept: RHEUMATOLOGY | Facility: CLINIC | Age: 79
End: 2024-04-23
Payer: MEDICARE

## 2024-04-23 VITALS
SYSTOLIC BLOOD PRESSURE: 158 MMHG | TEMPERATURE: 97.7 F | BODY MASS INDEX: 28.35 KG/M2 | HEART RATE: 82 BPM | HEIGHT: 70 IN | WEIGHT: 198 LBS | DIASTOLIC BLOOD PRESSURE: 83 MMHG

## 2024-04-23 DIAGNOSIS — M06.4 POLYARTHRITIS, INFLAMMATORY (MULTI): ICD-10-CM

## 2024-04-23 DIAGNOSIS — M19.90 UNSPECIFIED OSTEOARTHRITIS, UNSPECIFIED SITE: ICD-10-CM

## 2024-04-23 PROCEDURE — 99214 OFFICE O/P EST MOD 30 MIN: CPT | Performed by: INTERNAL MEDICINE

## 2024-04-23 PROCEDURE — 99214 OFFICE O/P EST MOD 30 MIN: CPT | Mod: GC | Performed by: INTERNAL MEDICINE

## 2024-04-23 PROCEDURE — 3077F SYST BP >= 140 MM HG: CPT | Performed by: INTERNAL MEDICINE

## 2024-04-23 PROCEDURE — 3079F DIAST BP 80-89 MM HG: CPT | Performed by: INTERNAL MEDICINE

## 2024-04-23 PROCEDURE — 1159F MED LIST DOCD IN RCRD: CPT | Performed by: INTERNAL MEDICINE

## 2024-04-23 PROCEDURE — 1157F ADVNC CARE PLAN IN RCRD: CPT | Performed by: INTERNAL MEDICINE

## 2024-04-23 PROCEDURE — 1160F RVW MEDS BY RX/DR IN RCRD: CPT | Performed by: INTERNAL MEDICINE

## 2024-04-23 PROCEDURE — 1123F ACP DISCUSS/DSCN MKR DOCD: CPT | Performed by: INTERNAL MEDICINE

## 2024-04-23 RX ORDER — METHOTREXATE 2.5 MG/1
25 TABLET ORAL
Qty: 102.857 TABLET | Refills: 1 | Status: SHIPPED | OUTPATIENT
Start: 2024-04-28 | End: 2024-04-29 | Stop reason: SDUPTHER

## 2024-04-23 RX ORDER — FOLIC ACID 1 MG/1
1 TABLET ORAL DAILY
Qty: 90 TABLET | Refills: 1 | Status: SHIPPED | OUTPATIENT
Start: 2024-04-23

## 2024-04-23 ASSESSMENT — ENCOUNTER SYMPTOMS
EYES NEGATIVE: 1
CARDIOVASCULAR NEGATIVE: 1
HEMATOLOGIC/LYMPHATIC NEGATIVE: 1
ARTHRALGIAS: 1
CONSTITUTIONAL NEGATIVE: 1
ALLERGIC/IMMUNOLOGIC NEGATIVE: 1
NEUROLOGICAL NEGATIVE: 1
GASTROINTESTINAL NEGATIVE: 1
PSYCHIATRIC NEGATIVE: 1

## 2024-04-23 NOTE — PATIENT INSTRUCTIONS
- We are increasing the dose of methotrexate to 10 tablets weekly ( you can split the dose 5 tabs fri am and 5 tab fri pm)  - continue on prednisone same dose for now,  I would titrate down the prednisone in future if symptoms are controlled    Follow up x 3 months.

## 2024-04-24 ENCOUNTER — APPOINTMENT (OUTPATIENT)
Dept: SURGERY | Facility: CLINIC | Age: 79
End: 2024-04-24
Payer: MEDICARE

## 2024-04-24 NOTE — PROGRESS NOTES
Subjective   Patient ID: [id number]   Ehsan Dias is a 78 y.o. male who presents for Follow-up.  HPI  Follow up for ICI induced arthritis     Current IS          Methotrexate 20 mg    prednisone 5 mg daily ( variable doses August 2022)    RECALL  This is a 77-year-old male who presents to [facility]. Past medical history includes that of hypertension, hyperlipidemia, hypothyroidism, coronary artery disease and most importantly, mesothelioma. For mesothelioma, he follows with oncologist. He was diagnosed on 4/2021, about 1 year ago, he was started on combination of ipilimumab and nivolumab. He developed significant joint pain/inflammatory arthritis 6 months later. Thereafter, ipilimumab was discontinued. He was started on Plaquenil. At the same time, he developed a skin rash and it was not certain whether this was related to Plaquenil or ipilimumab. Irrespective, the Plaquenil was discontinued. He was treated with prednisone and reports that the joint symptoms are well with prednisone 10 mg a day. At his recent follow-up with his rheumatologist, Dr. Segura, it was advised that he consider reducing the dose of prednisone.   Today, patient mentions that he has been taking prednisone 7.5 mg a day and continues to have some discomfort on his MCPs as well as wrist joints. He reports protracted morning stiffness. He does not report much discomfort in other joints including feet, knees, ankles, shoulders or elbows. He does not report much discomfort on neck or back. He does have a history of right shoulder replacement. Patient questions whether it is reasonable to take prednisone 10 mg a day on a regular basis.      ICI history for pleural mesothelioma   Started on Ipilimumab and Nivolumab. Documented clinical and radiographic response.  Ipilimumab dropped and nivolumab alone was continued starting December 21, 2022 given toxicities of the combination.  Last treated February 2024, completed 2 years of treatment        Interval Symptoms  -  Had an allergic reaction to doxycycline in march  Has been on higher doses of prednisone for about 3 weeks, now done to 5mg  Noticed good arthritis control when he was in high dose prednisone  Now having intermittent morning stiffness for 15-20 mins and arthralgias after activity especially guitar playing  90% better overall  Otherwise no swelling of any joints  No extra articular RA symptoms       Review of Systems  Constitutional: Denies fever, chills   Eyes: Denies dry eyes, blurry vision, redness of the eyes, pain in the eyes   ENT: Denies dry mouth, loss of taste, sores in the mouth, or difficulty swallowing   Cardiovascular: Denies chest pain, palpitations   Respiratory: Denies shortness of breath   Gastrointestinal: Denies changes in bowl or bladder function, nausea, vomiting, heartburn   Integumentary: Denies photosensitivity, rash or lesions, Raynaud's   Neurological: Denies any numbness or tingling   Hematologic/Lymphatic: Denies bleeding, alopecia, Raynaud's phenomena   Review of Systems   Constitutional: Negative.    HENT: Negative.     Eyes: Negative.    Cardiovascular: Negative.    Gastrointestinal: Negative.    Genitourinary: Negative.    Musculoskeletal:  Positive for arthralgias.   Skin: Negative.    Allergic/Immunologic: Negative.    Neurological: Negative.    Hematological: Negative.    Psychiatric/Behavioral: Negative.         Objective   Physical Exam  Physical Exam  General: Cooperative, in NAD   Eyes: Conjunctiva clear, sclera white, anicteric   Nose: No external deformity, no mucosal crusting or signs of bleeding   Throat/Mouth: Moist mucous membranes, normal dentition, no ulcers or lesions   Lungs: No respiratory distress; lungs CTAB; no wheezes, rales, rhonchi, or stridor   Heart: Normal S1 and S2; regular rate and rhythm; no murmurs, rubs, or gallops  Skin: No rashes, ulcers, tophi, or nodules noted  MSK:   Spine: Normal posture, no point tenderness to palpation,  normal ROM    Upper Extremities:   Hands: Tender 2nd bilaterally with minimal synovial thickening    Wrists:  Tender wrist - extensor aspect wo swelling b/l good ROM  Tinnels +ve     Elbows: No erythema, warmth, synovitis, or pain; normal ROM   Shoulders: No erythema, warmth, appreciable swelling, or pain; normal ROM   Lower Extremities:   Hips: No gross deformity, erythema, warmth, or pain; normal ROM   Knees: No erythema, warmth, swelling, or pain; normal ROM   Ankles: No erythema, warmth, synovitis, or pain; normal ROM  Feet: No gross deformity, erythema, or swelling; MTP squeeze negative bilaterally       Assessment/Plan   Diagnoses and all orders for this visit:  Polyarthritis, inflammatory (CMS/HCC)  ICI induced arthritis ( seronegative RA)  Grade 2 iAER  CDAI 9.5 today ( 2 TJC, 0 SJC)  Previously upto 6 TJC last visit  Good improvement overall, but has residual symptoms that went away when he was on higher doses of prednisone  Will increase methotrexate to 25mg weekly ( with split dosing for better absorption)  Will continue 5mg prednisone for now, re-eval in 3 mo  If in remission, will taper off prednisone   If continues to demonstrate active inflammation, Leflunomide addition can be considered with lowering of methotrexate dose  Or continuing low dose prednisone with methotrexate    Given he had malignancy; will try to avoid use of TNFi if possible in light of recent evidence from observational data showing increased recurrence risk with TNFi or Il-6 inhibition compared to methotrexate    CTS - predominantly left hand  No complaints of weakness or numbness today  If symptoms progress will proceed with EMG  No nocturnal symptoms  No weakness on exam today    HM-  UTD with vaccines  Will address shingles + RSV next visit as he would have to hold methotrexate 1 dose after immunization      Case seen and discussed with Dr Obie Barton MD

## 2024-04-25 RX ORDER — LISINOPRIL 10 MG/1
10 TABLET ORAL DAILY
Qty: 30 TABLET | Refills: 0 | Status: SHIPPED | OUTPATIENT
Start: 2024-04-25 | End: 2024-05-11 | Stop reason: SDUPTHER

## 2024-05-03 ENCOUNTER — LAB (OUTPATIENT)
Dept: LAB | Facility: LAB | Age: 79
End: 2024-05-03
Payer: MEDICARE

## 2024-05-03 DIAGNOSIS — M06.4 POLYARTHRITIS, INFLAMMATORY (MULTI): ICD-10-CM

## 2024-05-03 LAB
ALBUMIN SERPL BCP-MCNC: 4.3 G/DL (ref 3.4–5)
ALP SERPL-CCNC: 88 U/L (ref 33–136)
ALT SERPL W P-5'-P-CCNC: 25 U/L (ref 10–52)
ANION GAP SERPL CALC-SCNC: 14 MMOL/L (ref 10–20)
AST SERPL W P-5'-P-CCNC: 20 U/L (ref 9–39)
BASOPHILS # BLD AUTO: 0.05 X10*3/UL (ref 0–0.1)
BASOPHILS NFR BLD AUTO: 0.7 %
BILIRUB SERPL-MCNC: 0.5 MG/DL (ref 0–1.2)
BUN SERPL-MCNC: 21 MG/DL (ref 6–23)
CALCIUM SERPL-MCNC: 9.9 MG/DL (ref 8.6–10.6)
CHLORIDE SERPL-SCNC: 101 MMOL/L (ref 98–107)
CO2 SERPL-SCNC: 31 MMOL/L (ref 21–32)
CREAT SERPL-MCNC: 0.82 MG/DL (ref 0.5–1.3)
CRP SERPL-MCNC: 0.35 MG/DL
EGFRCR SERPLBLD CKD-EPI 2021: 89 ML/MIN/1.73M*2
EOSINOPHIL # BLD AUTO: 0.11 X10*3/UL (ref 0–0.4)
EOSINOPHIL NFR BLD AUTO: 1.6 %
ERYTHROCYTE [DISTWIDTH] IN BLOOD BY AUTOMATED COUNT: 15.2 % (ref 11.5–14.5)
GLUCOSE SERPL-MCNC: 78 MG/DL (ref 74–99)
HCT VFR BLD AUTO: 43.6 % (ref 41–52)
HGB BLD-MCNC: 14.2 G/DL (ref 13.5–17.5)
IMM GRANULOCYTES # BLD AUTO: 0.05 X10*3/UL (ref 0–0.5)
IMM GRANULOCYTES NFR BLD AUTO: 0.7 % (ref 0–0.9)
LYMPHOCYTES # BLD AUTO: 1.55 X10*3/UL (ref 0.8–3)
LYMPHOCYTES NFR BLD AUTO: 22.2 %
MCH RBC QN AUTO: 31.8 PG (ref 26–34)
MCHC RBC AUTO-ENTMCNC: 32.6 G/DL (ref 32–36)
MCV RBC AUTO: 98 FL (ref 80–100)
MONOCYTES # BLD AUTO: 0.8 X10*3/UL (ref 0.05–0.8)
MONOCYTES NFR BLD AUTO: 11.4 %
NEUTROPHILS # BLD AUTO: 4.43 X10*3/UL (ref 1.6–5.5)
NEUTROPHILS NFR BLD AUTO: 63.4 %
NRBC BLD-RTO: 0 /100 WBCS (ref 0–0)
PLATELET # BLD AUTO: 320 X10*3/UL (ref 150–450)
POTASSIUM SERPL-SCNC: 4.1 MMOL/L (ref 3.5–5.3)
PROT SERPL-MCNC: 6.7 G/DL (ref 6.4–8.2)
RBC # BLD AUTO: 4.46 X10*6/UL (ref 4.5–5.9)
SODIUM SERPL-SCNC: 142 MMOL/L (ref 136–145)
WBC # BLD AUTO: 7 X10*3/UL (ref 4.4–11.3)

## 2024-05-03 PROCEDURE — 80053 COMPREHEN METABOLIC PANEL: CPT

## 2024-05-03 PROCEDURE — 86140 C-REACTIVE PROTEIN: CPT

## 2024-05-03 PROCEDURE — 85025 COMPLETE CBC W/AUTO DIFF WBC: CPT

## 2024-05-03 PROCEDURE — 36415 COLL VENOUS BLD VENIPUNCTURE: CPT

## 2024-05-06 ENCOUNTER — HOSPITAL ENCOUNTER (OUTPATIENT)
Dept: RADIOLOGY | Facility: CLINIC | Age: 79
End: 2024-05-06
Payer: MEDICARE

## 2024-05-06 ENCOUNTER — APPOINTMENT (OUTPATIENT)
Dept: RADIOLOGY | Facility: CLINIC | Age: 79
End: 2024-05-06
Payer: MEDICARE

## 2024-05-07 ENCOUNTER — HOSPITAL ENCOUNTER (OUTPATIENT)
Dept: RADIOLOGY | Facility: CLINIC | Age: 79
Discharge: HOME | End: 2024-05-07
Payer: MEDICARE

## 2024-05-07 DIAGNOSIS — C45.9 MALIGNANT NEOPLASM OF MESOTHELIAL TISSUE (MULTI): ICD-10-CM

## 2024-05-07 PROCEDURE — 74177 CT ABD & PELVIS W/CONTRAST: CPT

## 2024-05-07 PROCEDURE — 2550000001 HC RX 255 CONTRASTS: Performed by: INTERNAL MEDICINE

## 2024-05-07 PROCEDURE — 71250 CT THORAX DX C-: CPT | Performed by: STUDENT IN AN ORGANIZED HEALTH CARE EDUCATION/TRAINING PROGRAM

## 2024-05-07 PROCEDURE — 71250 CT THORAX DX C-: CPT

## 2024-05-07 RX ADMIN — IOHEXOL 68 ML: 350 INJECTION, SOLUTION INTRAVENOUS at 14:51

## 2024-05-07 NOTE — PROGRESS NOTES
"Jatin Dias  is a 78 y.o. male who presents for evaluation of mesothelioma status post pleurectomy decortication on 5/7/2021.     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 I felt he was an appropriate operative candidate and offered him pleurectomy decortication. This was performed 5/7/2021. Aside from some transient hypotension, the patient had an unremarkable postoperative course and was discharged home with home care. Since his surgery, he has received adjuvant chemoimmunotherapy.    Currently the patient is presenting for routine follow-up and in their usual state of health. He denies the following symptoms: chest pain, shortness of breath at rest, shortness of breath with activity, cough, hemoptysis, fevers, chills, and weight loss.  Mouth sore that lasted 3 weeks and then resolved    There have been no significant changes to their documented medical, surgical and family history.     He  reports that he quit smoking about 55 years ago. His smoking use included cigarettes. He has been exposed to tobacco smoke. He has never used smokeless tobacco. He reports that he does not currently use alcohol. He reports that he does not currently use drugs.    Objective   Physical Exam  The patient is well-appearing and in no acute distress. The trachea is midline and there is no crepitus. The lungs were clear to auscultation grossly. There was good effort and excursion. The heart had a regular rate and rhythm. The abdomen was soft, nontender and nondistended. The extremities had no edema or gross deformities. Mood and affect are appropriate.  Diagnostic Studies  I reviewed documentation from medical oncology believes the patient is \"clinically doing well\"  === 05/07/24 ===    CT CHEST WO IV CONTRAST    - Impression -  CHEST:  1. Stable post surgical changes in the right hemithorax with dense  mesh and slightly thickening adjacent medial pleural lining " and right  hemidiaphragm. No evidence of nodular thickening or masses to suggest  recurrence.  2. Stable subcentimeter bilateral few pulmonary nodules. No new  suspicious pulmonary nodules.  3. No suspicious intrathoracic lymphadenopathy.    ABDOMEN-PELVIS:  1. No definite metastatic disease in the abdomen or pelvis.  2. Other ancillary findings are unchanged.      MACRO:  None    Signed by: Ian Irby 5/9/2024 8:56 AM  Dictation workstation:   [id number]    Assessment/Plan   Overall, I believe that the patient is doing well.     Based on the patient's clinical presentation and my review of their radiographic imaging, I believe they have no evidence of cancer recurrence.  I recommend ongoing radiographic screening.    I recommend  radiographic surveillance by oncology with yearly visits in this clinic    I discussed this in detail with the patient, including a discussion of alternatives. They were comfortable with this approach.     Juan Michelle MD  [phone]

## 2024-05-08 ENCOUNTER — OFFICE VISIT (OUTPATIENT)
Dept: HEMATOLOGY/ONCOLOGY | Facility: HOSPITAL | Age: 79
End: 2024-05-08
Payer: MEDICARE

## 2024-05-08 VITALS
HEART RATE: 73 BPM | RESPIRATION RATE: 16 BRPM | OXYGEN SATURATION: 96 % | BODY MASS INDEX: 28.22 KG/M2 | DIASTOLIC BLOOD PRESSURE: 59 MMHG | TEMPERATURE: 96.6 F | WEIGHT: 196.65 LBS | SYSTOLIC BLOOD PRESSURE: 123 MMHG

## 2024-05-08 DIAGNOSIS — C45.9 MALIGNANT NEOPLASM OF MESOTHELIAL TISSUE (MULTI): ICD-10-CM

## 2024-05-08 DIAGNOSIS — D89.89 OTHER SPECIFIED DISORDERS INVOLVING THE IMMUNE MECHANISM, NOT ELSEWHERE CLASSIFIED (MULTI): ICD-10-CM

## 2024-05-08 PROCEDURE — 1126F AMNT PAIN NOTED NONE PRSNT: CPT | Performed by: INTERNAL MEDICINE

## 2024-05-08 PROCEDURE — 1123F ACP DISCUSS/DSCN MKR DOCD: CPT | Performed by: INTERNAL MEDICINE

## 2024-05-08 PROCEDURE — 3074F SYST BP LT 130 MM HG: CPT | Performed by: INTERNAL MEDICINE

## 2024-05-08 PROCEDURE — 1160F RVW MEDS BY RX/DR IN RCRD: CPT | Performed by: INTERNAL MEDICINE

## 2024-05-08 PROCEDURE — 3078F DIAST BP <80 MM HG: CPT | Performed by: INTERNAL MEDICINE

## 2024-05-08 PROCEDURE — 1159F MED LIST DOCD IN RCRD: CPT | Performed by: INTERNAL MEDICINE

## 2024-05-08 PROCEDURE — 1157F ADVNC CARE PLAN IN RCRD: CPT | Performed by: INTERNAL MEDICINE

## 2024-05-08 PROCEDURE — 99214 OFFICE O/P EST MOD 30 MIN: CPT | Performed by: INTERNAL MEDICINE

## 2024-05-08 PROCEDURE — 1036F TOBACCO NON-USER: CPT | Performed by: INTERNAL MEDICINE

## 2024-05-08 ASSESSMENT — ENCOUNTER SYMPTOMS
NUMBNESS: 0
MYALGIAS: 0
ABDOMINAL PAIN: 0
VERTIGO: 0
CHILLS: 0
CHANGE IN BOWEL HABIT: 0
ARTHRALGIAS: 0
FATIGUE: 0
SORE THROAT: 0
JOINT SWELLING: 0
SWOLLEN GLANDS: 0
ANOREXIA: 0
HEADACHES: 0
FEVER: 0
NECK PAIN: 0
VOMITING: 0
COUGH: 0
WEAKNESS: 0
DIAPHORESIS: 0
NAUSEA: 0
VISUAL CHANGE: 0

## 2024-05-08 ASSESSMENT — PAIN SCALES - GENERAL: PAINLEVEL: 0-NO PAIN

## 2024-05-08 NOTE — PROGRESS NOTES
Patient ID: Ehsan Dias is a 78 y.o. male.    DIAGNOSIS     Malignant right pleural mesothelioma- epithelioid type.  Date of diagnosis: 4/2/2021 from VATS guided pleural mass biopsy the neoplasm shows solid and papillary growth patterns. IHC of  tumor  cells were POSITIVE for calretinin, WT-1, CK5/6 and D2-40 (weak, focal), and NEGATIVE for CEAm, TTF-1, B72.3 and MOC31,        STAGING     Initial T2N0M0  Progression in chest and peritoneum in January 2022        CURRENT SITES OF DISEASE     pleura, mediastinum, peritoneum        MOLECULAR GENOMICS     MUTYH 48.9%  BAP1 17.5%   Tumor Mutational [place] (TMB): 5.8 m/MB Microsatellite Instability (MSI) Status: Stable           SERUM TUMOR MARKER     Not applicable        PRIOR THERAPY     1- Pleurectomy May 7th , 2021   2- 02.23.2022 : Started on Ipilimumab and Nivolumab. Documented clinical and radiographic response.  Ipilimumab dropped and nivolumab alone was continued starting December 21, 2022 given toxicities of the combination.  Last treated February 2024, completed 2 years of treatment        CURRENT THERAPY     Observation        CURRENT ONCOLOGICAL PROBLEMS     1/ R sided pleuritic chest pain , Improving  2. weight loss- in history it seems like this is intentional (need additional data points in future follow ups) - Gaining weight post surgery  3. dry cough - Significantly improved  4- Polypoid lesion seen on CT scan of the chest within the distal esophagus, referred for endoscopy.,  September 2021  5-  ascites starting Jan 2022.  Paracentesis performed in February suggest malignant ascites from mesothelioma.  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  Seen by rheumatology August 2022, prescribed Celebrex. 1/18/23 - Prednisone taper 10 mg to current 7.5 mg.  Down to prednisone 5 mg with initiation of methotrexate  in July 2023.  Patient notified me on August 23, 2023 that he is not taking his methotrexate and he is fearful of the side effects.  Continues to have polyarthritis symptoms.  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  Due to see ENT.        HISTORY OF PRESENT ILLNESS      His initial presentation was in July of 2018 when he presented with cc of  persistent fever, right sided pleuritic chest pain for a month. He was seen by Dr. Catie Harvey in Pulmonary clinic, routine imaging showed right side pleural effusion.  Diagnostic thoracentesis was performed on 7/13 that showed exudative effusion with neutrophilic predominance, cultures negative, cytology negative for carcinoma.   He completed course oral antibiotics and his pain and effusion resolved.  Again, had recurrence of pleuritic chest pain  and pleural effusion   later in 12/2020. The effusion was found to be worse on repeat chest imaging ( CT Chest in 2/2021). Diagnostic and therapeutic thoracentesis was done which showed exudative effusion with lymphocyte predominance. Given his recurrent ipsilateral moderate  size pleural effusion and near complete collapse of the right lower lobe and mild right middle lobe atelectasis on CT chest- he was referred to CT surgeon and a PET CT was also obtained.      3/2021: PET CT revealed right-sided pleural effusion with multiple hypermetabolic  areas of pleural nodularity visualized along the  pleural surface of the right lung, which predominantly involve the right lung base. In addition, there were multiple FDG avid mediastinal lymph nodes including enlarged subcarinal lymph nodes (maximum SUV  of 4.4), paratracheal lymph nodes (maximum SUV of 3.1), and right hilar  lymph nodes (maximum SUV of 2.9).     3/23/21: was seen by Dr. Dannielle Junior from CT surgery and was scheduled to get R VATS pleural biopsy on 4/2/21: thorascopic exploration showed some adhesions at the diaphragm to the chest wall and also  to the lung.        PAST MEDICAL HISTORY     CAD with MI s/p PCI to RCA with 2 stents,   OA R shoulder s/p replacement,   HTN   elevated PSA with FDG avidity in post lobe of prostate- followed by Dr. 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   former smoker, quit 1969, 2 packs x 8 year  = 16 pack years   smoked marijuana since 1970s; quit few months ago  Possible exposure to asbestos when worked in the [employer].          CURRENT MEDS     see med list         ALLERGIES     nkda         FAMILY HISTORY     no family h/o mesothelioma     Subjective    Cancer  Pertinent negatives include no abdominal pain, anorexia, arthralgias, change in bowel habit, chest pain, chills, congestion, coughing, diaphoresis, fatigue, fever, headaches, joint swelling, myalgias, nausea, neck pain, numbness, rash, sore throat, swollen glands, urinary symptoms, vertigo, visual change, vomiting or weakness.     About 5 days ago the patient had some diarrhea but this resolved after 24 hours otherwise feeling well, followed by his rheumatologist for his inflammatory polyarthritis, on prednisone 5 mg daily and methotrexate.    Objective    BSA: 2.1 meters squared  /59 (BP Location: Left arm, Patient Position: Sitting, BP Cuff Size: Large adult)   Pulse 73   Temp 35.9 °C (96.6 °F) (Temporal)   Resp 16   Wt 89.2 kg  (196 lb 10.4 oz)   SpO2 96%   BMI 28.22 kg/m²      Physical Exam  Constitutional:       General: He is not in acute distress.     Appearance: Normal appearance. He is not ill-appearing, toxic-appearing or diaphoretic.   HENT:      Mouth/Throat:      Pharynx: Oropharynx is clear. No oropharyngeal exudate or posterior oropharyngeal erythema.   Eyes:      General: No scleral icterus.     Conjunctiva/sclera: Conjunctivae normal.   Cardiovascular:      Rate and Rhythm: Normal rate and regular rhythm.      Pulses: Normal pulses.      Heart sounds: Normal heart sounds. No murmur heard.     No friction rub. No gallop.   Pulmonary:      Effort: Pulmonary effort is normal. No respiratory distress.      Breath sounds: Normal breath sounds. No stridor. No wheezing, rhonchi or rales.   Chest:      Chest wall: No tenderness.   Abdominal:      General: Abdomen is flat. Bowel sounds are normal. There is no distension.      Palpations: Abdomen is soft. There is no mass.      Tenderness: There is no abdominal tenderness. There is no guarding or rebound.   Musculoskeletal:      Cervical back: No tenderness.      Right lower leg: No edema.      Left lower leg: No edema.   Lymphadenopathy:      Cervical: No cervical adenopathy.   Skin:     Coloration: Skin is not jaundiced or pale.      Findings: No bruising, erythema, lesion or rash.   Neurological:      General: No focal deficit present.      Mental Status: He is oriented to person, place, and time.   Psychiatric:         Mood and Affect: Mood normal.         Behavior: Behavior normal.         Performance Status:  Asymptomatic     Latest Reference Range & Units 05/03/24 09:39   GLUCOSE 74 - 99 mg/dL 78   SODIUM 136 - 145 mmol/L 142   POTASSIUM 3.5 - 5.3 mmol/L 4.1   CHLORIDE 98 - 107 mmol/L 101   Bicarbonate 21 - 32 mmol/L 31   Anion Gap 10 - 20 mmol/L 14   Blood Urea Nitrogen 6 - 23 mg/dL 21   Creatinine 0.50 - 1.30 mg/dL 0.82   EGFR >60 mL/min/1.73m*2 89   Calcium 8.6 - 10.6 mg/dL  9.9   Albumin 3.4 - 5.0 g/dL 4.3   Alkaline Phosphatase 33 - 136 U/L 88   ALT 10 - 52 U/L 25   AST 9 - 39 U/L 20   Bilirubin Total 0.0 - 1.2 mg/dL 0.5   Total Protein 6.4 - 8.2 g/dL 6.7   C-Reactive Protein <1.00 mg/dL 0.35   LEUKOCYTES (10*3/UL) IN BLOOD BY AUTOMATED COUNT, [place] 4.4 - 11.3 x10*3/uL 7.0   nRBC 0.0 - 0.0 /100 WBCs 0.0   ERYTHROCYTES (10*6/UL) IN BLOOD BY AUTOMATED COUNT, [place] 4.50 - 5.90 x10*6/uL 4.46 (L)   HEMOGLOBIN 13.5 - 17.5 g/dL 14.2   HEMATOCRIT 41.0 - 52.0 % 43.6   MCV 80 - 100 fL 98   MCH 26.0 - 34.0 pg 31.8   MCHC 32.0 - 36.0 g/dL 32.6   RED CELL DISTRIBUTION WIDTH 11.5 - 14.5 % 15.2 (H)   PLATELETS (10*3/UL) IN BLOOD AUTOMATED COUNT, [place] 150 - 450 x10*3/uL 320   NEUTROPHILS/100 LEUKOCYTES IN BLOOD BY AUTOMATED COUNT, [place] 40.0 - 80.0 % 63.4   Immature Granulocytes %, Automated 0.0 - 0.9 % 0.7   Lymphocytes % 13.0 - 44.0 % 22.2   Monocytes % 2.0 - 10.0 % 11.4   Eosinophils % 0.0 - 6.0 % 1.6   Basophils % 0.0 - 2.0 % 0.7   NEUTROPHILS (10*3/UL) IN BLOOD BY AUTOMATED COUNT, [place] 1.60 - 5.50 x10*3/uL 4.43   Immature Granulocytes Absolute, Automated 0.00 - 0.50 x10*3/uL 0.05   Lymphocytes Absolute 0.80 - 3.00 x10*3/uL 1.55   Monocytes Absolute 0.05 - 0.80 x10*3/uL 0.80   Eosinophils Absolute 0.00 - 0.40 x10*3/uL 0.11   Basophils Absolute 0.00 - 0.10 x10*3/uL 0.05   (L): Data is abnormally low  (H): Data is abnormally high      Assessment/Plan     This is a 78-year-old gentleman with epithelioid mesothelioma who was treated with combination immunotherapy from February 2022 through February 2024 with a complete response.  He had disease involving the pleura mediastinum and peritoneum.  He is clinically doing well on observation.  We are seeing him every 2 months with scans every 4 months.  He had a scan a day ago I personally reviewed the scan showing no evidence of disease recurrence.  Final radiology report pending.      Cancer Staging   No matching staging information was  found for the patient.      Oncology History   Malignant neoplasm of mesothelial tissue (Multi)   2/27/2023 Initial Diagnosis    Malignant neoplasm of mesothelial tissue (CMS/HCC)     10/4/2023 - 2/7/2024 Chemotherapy    Nivolumab 240 mg (Biweekly), 28 Day Cycles          Shawn Blum MD  Professor of Medicine and Oncology  [facility]  Sejal Reese Chair in Lung Cancer  Thoracic Oncology  [facility]

## 2024-05-11 ENCOUNTER — OFFICE VISIT (OUTPATIENT)
Dept: PRIMARY CARE | Facility: CLINIC | Age: 79
End: 2024-05-11
Payer: MEDICARE

## 2024-05-11 VITALS
HEIGHT: 69 IN | RESPIRATION RATE: 12 BRPM | BODY MASS INDEX: 29.33 KG/M2 | SYSTOLIC BLOOD PRESSURE: 128 MMHG | HEART RATE: 72 BPM | DIASTOLIC BLOOD PRESSURE: 78 MMHG | WEIGHT: 198 LBS | OXYGEN SATURATION: 98 %

## 2024-05-11 DIAGNOSIS — E78.00 HYPERCHOLESTEROLEMIA: Chronic | ICD-10-CM

## 2024-05-11 DIAGNOSIS — R97.20 ELEVATED PSA: ICD-10-CM

## 2024-05-11 DIAGNOSIS — I25.5 ISCHEMIC CARDIOMYOPATHY: Chronic | ICD-10-CM

## 2024-05-11 DIAGNOSIS — R35.1 BENIGN PROSTATIC HYPERPLASIA WITH NOCTURIA: ICD-10-CM

## 2024-05-11 DIAGNOSIS — I10 PRIMARY HYPERTENSION: Chronic | ICD-10-CM

## 2024-05-11 DIAGNOSIS — N40.1 BENIGN PROSTATIC HYPERPLASIA WITH NOCTURIA: ICD-10-CM

## 2024-05-11 DIAGNOSIS — C45.9 MALIGNANT NEOPLASM OF MESOTHELIAL TISSUE (MULTI): Chronic | ICD-10-CM

## 2024-05-11 DIAGNOSIS — C78.6 SECONDARY MALIGNANT NEOPLASM OF RETROPERITONEUM AND PERITONEUM (MULTI): Chronic | ICD-10-CM

## 2024-05-11 DIAGNOSIS — I70.0 ATHEROSCLEROSIS OF AORTA (CMS-HCC): Chronic | ICD-10-CM

## 2024-05-11 DIAGNOSIS — I25.10 CORONARY ARTERY DISEASE INVOLVING NATIVE CORONARY ARTERY OF NATIVE HEART WITHOUT ANGINA PECTORIS: Primary | Chronic | ICD-10-CM

## 2024-05-11 DIAGNOSIS — M06.4 POLYARTHRITIS, INFLAMMATORY (MULTI): ICD-10-CM

## 2024-05-11 PROBLEM — F32.9 MAJOR DEPRESSIVE DISORDER, SINGLE EPISODE: Status: RESOLVED | Noted: 2021-05-07 | Resolved: 2024-05-11

## 2024-05-11 PROBLEM — R06.02 SOB (SHORTNESS OF BREATH) ON EXERTION: Chronic | Status: RESOLVED | Noted: 2023-09-15 | Resolved: 2024-05-11

## 2024-05-11 PROCEDURE — 1160F RVW MEDS BY RX/DR IN RCRD: CPT | Performed by: FAMILY MEDICINE

## 2024-05-11 PROCEDURE — 1036F TOBACCO NON-USER: CPT | Performed by: FAMILY MEDICINE

## 2024-05-11 PROCEDURE — 1159F MED LIST DOCD IN RCRD: CPT | Performed by: FAMILY MEDICINE

## 2024-05-11 PROCEDURE — 1157F ADVNC CARE PLAN IN RCRD: CPT | Performed by: FAMILY MEDICINE

## 2024-05-11 PROCEDURE — 3074F SYST BP LT 130 MM HG: CPT | Performed by: FAMILY MEDICINE

## 2024-05-11 PROCEDURE — 1123F ACP DISCUSS/DSCN MKR DOCD: CPT | Performed by: FAMILY MEDICINE

## 2024-05-11 PROCEDURE — 99214 OFFICE O/P EST MOD 30 MIN: CPT | Performed by: FAMILY MEDICINE

## 2024-05-11 PROCEDURE — 3078F DIAST BP <80 MM HG: CPT | Performed by: FAMILY MEDICINE

## 2024-05-11 RX ORDER — DILTIAZEM HYDROCHLORIDE 240 MG/1
240 CAPSULE, COATED, EXTENDED RELEASE ORAL DAILY
Qty: 90 CAPSULE | Refills: 1 | Status: SHIPPED | OUTPATIENT
Start: 2024-05-11

## 2024-05-11 RX ORDER — LISINOPRIL 10 MG/1
10 TABLET ORAL DAILY
Qty: 90 TABLET | Refills: 1 | Status: SHIPPED | OUTPATIENT
Start: 2024-05-11

## 2024-05-11 RX ORDER — HYDROCHLOROTHIAZIDE 12.5 MG/1
12.5 TABLET ORAL DAILY
Qty: 90 TABLET | Refills: 1 | Status: SHIPPED | OUTPATIENT
Start: 2024-05-11

## 2024-05-11 RX ORDER — ATORVASTATIN CALCIUM 80 MG/1
80 TABLET, FILM COATED ORAL DAILY
Qty: 90 TABLET | Refills: 1 | Status: SHIPPED | OUTPATIENT
Start: 2024-05-11

## 2024-05-11 RX ORDER — METOPROLOL SUCCINATE 50 MG/1
50 TABLET, EXTENDED RELEASE ORAL 2 TIMES DAILY
Qty: 180 TABLET | Refills: 1 | Status: SHIPPED | OUTPATIENT
Start: 2024-05-11

## 2024-05-11 ASSESSMENT — ENCOUNTER SYMPTOMS
CHEST TIGHTNESS: 0
CONSTIPATION: 0
WEAKNESS: 0
BACK PAIN: 0
DYSPHORIC MOOD: 0
BLOOD IN STOOL: 0
DIARRHEA: 0
BRUISES/BLEEDS EASILY: 0
NERVOUS/ANXIOUS: 0
DIZZINESS: 0
ARTHRALGIAS: 1
FATIGUE: 0
ABDOMINAL DISTENTION: 0
EYE REDNESS: 0
ADENOPATHY: 0
DIFFICULTY URINATING: 0
SHORTNESS OF BREATH: 0
ABDOMINAL PAIN: 0
EYE PAIN: 0
FEVER: 0
SORE THROAT: 0
COUGH: 0
CHILLS: 0
DYSURIA: 0
APPETITE CHANGE: 0
HEADACHES: 0

## 2024-05-11 NOTE — PROGRESS NOTES
"Subjective   Patient ID: Ehsan Dias is a 78 y.o. male who presents for Follow-up.    HPI     Review of Systems    Objective   /78   Pulse 72   Resp 12   Ht 1.753 m (5' 9\")   Wt 89.8 kg (198 lb)   SpO2 98%   BMI 29.24 kg/m²     Physical Exam    Assessment/Plan          "

## 2024-05-11 NOTE — PROGRESS NOTES
Subjective   Patient ID: Ehsan Dias is a 78 y.o. male who presents for Follow-up.  Mesothelioma is doing very well post immunotherapy. Monitoring with CT which has bee stable.  Breathing is ok, and gets occasion throat clearing but no cough.     RA is still bothersome mostly in hands, swelling and stiffness. Improving with methotrexate, Dose is being titrated up.     Pt has chronic, stable CMP, CAD, PVD, HTN, CMP.  Pt is taking Diltiazepam, Metoprolol and HCTZ. Tolerating well.  Exercising 1-2 days per week   Low sodium diet is usually being followed.   Is not monitoring home blood pressures.   Denies HA, vision changes or CP.     Pt has Dyslipidemia.   Lipid panel showed LDL 53, .  Currently taking Atorvastatin and is tolerating well without muscle pains or weakness.     Elevated PSA is chronic, he has not been seeing Dr Carrington ( plan was for MRI prostate but he declined). He has hx of negative prostate biopsy. Last PSA was 7 . Pt has 1 x nightly nocturia. Urinary stream is good and he denies difficulty starting urination or emptying bladder.     Pt has chronic, stable hypothyroidism.   Taking Synthroid  Lipid panel is well controlled.  Energy is good. Pt denies significant weight gain, low mood, constipation, or skin changes.   Medication is tolerated well without anxiety, tremors, palpitations, involuntary weight loss, heat intolerance or diarrhea.             Review of Systems   Constitutional:  Negative for appetite change, chills, fatigue and fever.   HENT:  Negative for congestion, hearing loss and sore throat.    Eyes:  Negative for pain, redness and visual disturbance.   Respiratory:  Negative for cough, chest tightness and shortness of breath.    Cardiovascular:  Negative for chest pain and leg swelling.   Gastrointestinal:  Negative for abdominal distention, abdominal pain, blood in stool, constipation and diarrhea.   Genitourinary:  Negative for difficulty urinating and dysuria.  "  Musculoskeletal:  Positive for arthralgias. Negative for back pain.   Skin:  Negative for rash.   Neurological:  Negative for dizziness, weakness and headaches.   Hematological:  Negative for adenopathy. Does not bruise/bleed easily.   Psychiatric/Behavioral:  Negative for dysphoric mood. The patient is not nervous/anxious.        Objective   /78   Pulse 72   Resp 12   Ht 1.753 m (5' 9\")   Wt 89.8 kg (198 lb)   SpO2 98%   BMI 29.24 kg/m²    Physical Exam  Constitutional:       General: He is not in acute distress.     Appearance: Normal appearance.   Cardiovascular:      Rate and Rhythm: Normal rate and regular rhythm.      Heart sounds: Normal heart sounds. No murmur heard.  Pulmonary:      Effort: Pulmonary effort is normal.      Breath sounds: Normal breath sounds.   Abdominal:      Palpations: Abdomen is soft.      Tenderness: There is no abdominal tenderness.   Neurological:      Mental Status: He is alert.   Psychiatric:         Mood and Affect: Mood normal.         Judgment: Judgment normal.           Assessment/Plan   Diagnoses and all orders for this visit:  Coronary artery disease/Atherosclerosis of aorta/Ischemic cardiomyopathy- stable, monitor.   Primary hypertension - well controlled, continue current meds and monitor  Hypercholesterolemia - doing well on Atorvastatin, continue  Benign prostatic hyperplasia with nocturia/ Elevated PSA - recheck PSA today  Malignant neoplasm of mesothelial tissue - doing well post immunotherapy, monitoring with oncology  Polyarthritis, inflammatory - improving with Methotrexate, Prednisone, per Rheumatology    Follow up in 6 months, 30mins       "

## 2024-05-13 ENCOUNTER — APPOINTMENT (OUTPATIENT)
Dept: OTOLARYNGOLOGY | Facility: CLINIC | Age: 79
End: 2024-05-13
Payer: MEDICARE

## 2024-05-15 ENCOUNTER — OFFICE VISIT (OUTPATIENT)
Dept: SURGERY | Facility: CLINIC | Age: 79
End: 2024-05-15
Payer: MEDICARE

## 2024-05-15 VITALS
DIASTOLIC BLOOD PRESSURE: 66 MMHG | RESPIRATION RATE: 19 BRPM | HEIGHT: 69 IN | HEART RATE: 82 BPM | TEMPERATURE: 98.1 F | SYSTOLIC BLOOD PRESSURE: 132 MMHG | OXYGEN SATURATION: 96 % | BODY MASS INDEX: 28.29 KG/M2 | WEIGHT: 191 LBS

## 2024-05-15 DIAGNOSIS — C45.9 MALIGNANT NEOPLASM OF MESOTHELIAL TISSUE (MULTI): Primary | Chronic | ICD-10-CM

## 2024-05-15 PROCEDURE — 1123F ACP DISCUSS/DSCN MKR DOCD: CPT | Performed by: THORACIC SURGERY (CARDIOTHORACIC VASCULAR SURGERY)

## 2024-05-15 PROCEDURE — 1036F TOBACCO NON-USER: CPT | Performed by: THORACIC SURGERY (CARDIOTHORACIC VASCULAR SURGERY)

## 2024-05-15 PROCEDURE — 3078F DIAST BP <80 MM HG: CPT | Performed by: THORACIC SURGERY (CARDIOTHORACIC VASCULAR SURGERY)

## 2024-05-15 PROCEDURE — 1159F MED LIST DOCD IN RCRD: CPT | Performed by: THORACIC SURGERY (CARDIOTHORACIC VASCULAR SURGERY)

## 2024-05-15 PROCEDURE — 1157F ADVNC CARE PLAN IN RCRD: CPT | Performed by: THORACIC SURGERY (CARDIOTHORACIC VASCULAR SURGERY)

## 2024-05-15 PROCEDURE — 99214 OFFICE O/P EST MOD 30 MIN: CPT | Performed by: THORACIC SURGERY (CARDIOTHORACIC VASCULAR SURGERY)

## 2024-05-15 PROCEDURE — 3075F SYST BP GE 130 - 139MM HG: CPT | Performed by: THORACIC SURGERY (CARDIOTHORACIC VASCULAR SURGERY)

## 2024-05-15 PROCEDURE — 1126F AMNT PAIN NOTED NONE PRSNT: CPT | Performed by: THORACIC SURGERY (CARDIOTHORACIC VASCULAR SURGERY)

## 2024-05-15 PROCEDURE — 1160F RVW MEDS BY RX/DR IN RCRD: CPT | Performed by: THORACIC SURGERY (CARDIOTHORACIC VASCULAR SURGERY)

## 2024-05-15 SDOH — ECONOMIC STABILITY: FOOD INSECURITY: WITHIN THE PAST 12 MONTHS, THE FOOD YOU BOUGHT JUST DIDN'T LAST AND YOU DIDN'T HAVE MONEY TO GET MORE.: NEVER TRUE

## 2024-05-15 SDOH — ECONOMIC STABILITY: FOOD INSECURITY: WITHIN THE PAST 12 MONTHS, YOU WORRIED THAT YOUR FOOD WOULD RUN OUT BEFORE YOU GOT MONEY TO BUY MORE.: NEVER TRUE

## 2024-05-15 ASSESSMENT — LIFESTYLE VARIABLES
HOW MANY STANDARD DRINKS CONTAINING ALCOHOL DO YOU HAVE ON A TYPICAL DAY: PATIENT DOES NOT DRINK
HOW OFTEN DO YOU HAVE A DRINK CONTAINING ALCOHOL: NEVER
SKIP TO QUESTIONS 9-10: 1
HOW OFTEN DO YOU HAVE SIX OR MORE DRINKS ON ONE OCCASION: NEVER
AUDIT-C TOTAL SCORE: 0

## 2024-05-15 ASSESSMENT — ENCOUNTER SYMPTOMS
LOSS OF SENSATION IN FEET: 1
OCCASIONAL FEELINGS OF UNSTEADINESS: 0
DEPRESSION: 0

## 2024-05-15 ASSESSMENT — PAIN SCALES - GENERAL: PAINLEVEL: 0-NO PAIN

## 2024-05-22 ENCOUNTER — APPOINTMENT (OUTPATIENT)
Dept: OTOLARYNGOLOGY | Facility: CLINIC | Age: 79
End: 2024-05-22
Payer: MEDICARE

## 2024-06-20 ENCOUNTER — OFFICE VISIT (OUTPATIENT)
Dept: RHEUMATOLOGY | Facility: CLINIC | Age: 79
End: 2024-06-20
Payer: MEDICARE

## 2024-06-20 VITALS
TEMPERATURE: 98.2 F | DIASTOLIC BLOOD PRESSURE: 69 MMHG | HEART RATE: 76 BPM | BODY MASS INDEX: 29.15 KG/M2 | WEIGHT: 196.8 LBS | HEIGHT: 69 IN | SYSTOLIC BLOOD PRESSURE: 129 MMHG

## 2024-06-20 DIAGNOSIS — M06.4 POLYARTHRITIS, INFLAMMATORY (MULTI): ICD-10-CM

## 2024-06-20 DIAGNOSIS — M81.0 OSTEOPOROSIS: ICD-10-CM

## 2024-06-20 DIAGNOSIS — M19.90 UNSPECIFIED OSTEOARTHRITIS, UNSPECIFIED SITE: ICD-10-CM

## 2024-06-20 PROCEDURE — 99214 OFFICE O/P EST MOD 30 MIN: CPT | Mod: GC | Performed by: INTERNAL MEDICINE

## 2024-06-20 PROCEDURE — 1157F ADVNC CARE PLAN IN RCRD: CPT | Performed by: INTERNAL MEDICINE

## 2024-06-20 PROCEDURE — 3074F SYST BP LT 130 MM HG: CPT | Performed by: INTERNAL MEDICINE

## 2024-06-20 PROCEDURE — 1126F AMNT PAIN NOTED NONE PRSNT: CPT | Performed by: INTERNAL MEDICINE

## 2024-06-20 PROCEDURE — 99214 OFFICE O/P EST MOD 30 MIN: CPT | Performed by: INTERNAL MEDICINE

## 2024-06-20 PROCEDURE — 3078F DIAST BP <80 MM HG: CPT | Performed by: INTERNAL MEDICINE

## 2024-06-20 PROCEDURE — 1123F ACP DISCUSS/DSCN MKR DOCD: CPT | Performed by: INTERNAL MEDICINE

## 2024-06-20 PROCEDURE — 1159F MED LIST DOCD IN RCRD: CPT | Performed by: INTERNAL MEDICINE

## 2024-06-20 RX ORDER — MULTIVITAMIN/IRON/FOLIC ACID 18MG-0.4MG
1 TABLET ORAL DAILY
COMMUNITY

## 2024-06-20 RX ORDER — FERROUS SULFATE, DRIED 160(50) MG
1 TABLET, EXTENDED RELEASE ORAL DAILY
Qty: 90 TABLET | Refills: 0 | Status: SHIPPED | OUTPATIENT
Start: 2024-06-20 | End: 2024-09-18

## 2024-06-20 RX ORDER — WHEAT GERM OIL
OIL (ML) ORAL
COMMUNITY

## 2024-06-20 RX ORDER — CREATININE 100 %
POWDER (GRAM) MISCELLANEOUS
COMMUNITY

## 2024-06-20 RX ORDER — PREDNISONE 5 MG/1
TABLET ORAL
Qty: 30 TABLET | Refills: 0 | Status: SHIPPED | OUTPATIENT
Start: 2024-06-20

## 2024-06-20 RX ORDER — METHOTREXATE 2.5 MG/1
25 TABLET ORAL
Qty: 106 TABLET | Refills: 1 | Status: SHIPPED | OUTPATIENT
Start: 2024-06-23 | End: 2024-11-18

## 2024-06-20 RX ORDER — FOLIC ACID 1 MG/1
1 TABLET ORAL DAILY
Qty: 90 TABLET | Refills: 1 | Status: SHIPPED | OUTPATIENT
Start: 2024-06-20

## 2024-06-20 ASSESSMENT — PAIN SCALES - GENERAL: PAINLEVEL: 0-NO PAIN

## 2024-06-20 NOTE — PATIENT INSTRUCTIONS
- Please take prednisone 5 mg every other day now and then stop after 2 weeks    - Please schedule a DEXA scan for the bone density    - follow up x 4-5 months with repeated blood tests    - take vitamin D and Ca daily.

## 2024-06-21 ASSESSMENT — ENCOUNTER SYMPTOMS
CARDIOVASCULAR NEGATIVE: 1
CONSTITUTIONAL NEGATIVE: 1
GASTROINTESTINAL NEGATIVE: 1
NEUROLOGICAL NEGATIVE: 1
HEMATOLOGIC/LYMPHATIC NEGATIVE: 1
EYES NEGATIVE: 1
ALLERGIC/IMMUNOLOGIC NEGATIVE: 1
PSYCHIATRIC NEGATIVE: 1
ARTHRALGIAS: 1

## 2024-06-21 NOTE — PROGRESS NOTES
Subjective   Patient ID: [id number]   Ehsan Dias is a 78 y.o. male who presents for Follow-up.  HPI  Follow up for ICI induced arthritis     Current IS          Methotrexate 20 mg    prednisone 5 mg daily ( variable doses August 2022)    RECALL  This is a 77-year-old male who presents to [facility]. Past medical history includes that of hypertension, hyperlipidemia, hypothyroidism, coronary artery disease and most importantly, mesothelioma. For mesothelioma, he follows with oncologist. He was diagnosed on 4/2021, about 1 year ago, he was started on combination of ipilimumab and nivolumab. He developed significant joint pain/inflammatory arthritis 6 months later. Thereafter, ipilimumab was discontinued. He was started on Plaquenil. At the same time, he developed a skin rash and it was not certain whether this was related to Plaquenil or ipilimumab. Irrespective, the Plaquenil was discontinued. He was treated with prednisone and reports that the joint symptoms are well with prednisone 10 mg a day. At his recent follow-up with his rheumatologist, Dr. Segura, it was advised that he consider reducing the dose of prednisone.   Today, patient mentions that he has been taking prednisone 7.5 mg a day and continues to have some discomfort on his MCPs as well as wrist joints. He reports protracted morning stiffness. He does not report much discomfort in other joints including feet, knees, ankles, shoulders or elbows. He does not report much discomfort on neck or back. He does have a history of right shoulder replacement. Patient questions whether it is reasonable to take prednisone 10 mg a day on a regular basis.      ICI history for pleural mesothelioma   Started on Ipilimumab and Nivolumab. Documented clinical and radiographic response.  Ipilimumab dropped and nivolumab alone was continued starting December 21, 2022 given toxicities of the combination.  Last treated February 2024, completed 2 years of treatment        Interval Symptoms  Doing good, has further improvement in his symptoms by splitting the methotrexate dosing of 25 mg weekly.  Denies any MST, minimal arthralgias, able to function normally  No CTS symptoms anymore  No side effects, no infections       Review of Systems  Constitutional: Denies fever, chills   Eyes: Denies dry eyes, blurry vision, redness of the eyes, pain in the eyes   ENT: Denies dry mouth, loss of taste, sores in the mouth, or difficulty swallowing   Cardiovascular: Denies chest pain, palpitations   Respiratory: Denies shortness of breath   Gastrointestinal: Denies changes in bowl or bladder function, nausea, vomiting, heartburn   Integumentary: Denies photosensitivity, rash or lesions, Raynaud's   Neurological: Denies any numbness or tingling   Hematologic/Lymphatic: Denies bleeding, alopecia, Raynaud's phenomena   Review of Systems   Constitutional: Negative.    HENT: Negative.     Eyes: Negative.    Cardiovascular: Negative.    Gastrointestinal: Negative.    Genitourinary: Negative.    Musculoskeletal:  Positive for arthralgias.   Skin: Negative.    Allergic/Immunologic: Negative.    Neurological: Negative.    Hematological: Negative.    Psychiatric/Behavioral: Negative.         Objective   Physical Exam  Physical Exam  General: Cooperative, in NAD   Eyes: Conjunctiva clear, sclera white, anicteric   Nose: No external deformity, no mucosal crusting or signs of bleeding   Throat/Mouth: Moist mucous membranes, normal dentition, no ulcers or lesions   Lungs: No respiratory distress; lungs CTAB; no wheezes, rales, rhonchi, or stridor   Heart: Normal S1 and S2; regular rate and rhythm; no murmurs, rubs, or gallops  Skin: No rashes, ulcers, tophi, or nodules noted  MSK:   Spine: Normal posture, no point tenderness to palpation, normal ROM    Upper Extremities:   Hands: Tender 2nd bilaterally with minimal synovial thickening    Wrists:  Tender wrist - extensor aspect wo swelling b/l good ROM  Tinnels  +ve     Elbows: No erythema, warmth, synovitis, or pain; normal ROM   Shoulders: No erythema, warmth, appreciable swelling, or pain; normal ROM   Lower Extremities:   Hips: No gross deformity, erythema, warmth, or pain; normal ROM   Knees: No erythema, warmth, swelling, or pain; normal ROM   Ankles: No erythema, warmth, synovitis, or pain; normal ROM  Feet: No gross deformity, erythema, or swelling; MTP squeeze negative bilaterally     Lab Results   Component Value Date    WBC 7.0 05/03/2024    HGB 14.2 05/03/2024    HCT 43.6 05/03/2024    MCV 98 05/03/2024     05/03/2024      Lab Results   Component Value Date    GLUCOSE 78 05/03/2024    CALCIUM 9.9 05/03/2024     05/03/2024    K 4.1 05/03/2024    CO2 31 05/03/2024     05/03/2024    BUN 21 05/03/2024    CREATININE 0.82 05/03/2024      Assessment/Plan   Diagnoses and all orders for this visit:  Polyarthritis, inflammatory (CMS/HCC)  ICI induced arthritis ( seronegative RA)  Grade 2 iAER  CDAI 2 today ( 0 TJC, 0 SJC)  Previously upto 6 TJC ( last visit 2 TJC with moderate CDAI)    Now in remission  Plan to taper prednisone in 2 weeks then stop  And use PRN for flares  Patient advised to inform us if a flare happens  Labs reviewed     Hx of CTS - predominantly left hand  Currently , asymptomatic  No nocturnal symptoms  No weakness on exam today    HM-  UTD with vaccines  Advised to get shingles and RSV vaccines.  Follows with hemonc for hx of mesothelioma       Case seen and discussed with Dr Bazzi.  Elder Barton MD

## 2024-06-28 RX ORDER — METHOTREXATE 2.5 MG/1
25 TABLET ORAL
Qty: 106 TABLET | Refills: 1 | Status: SHIPPED | OUTPATIENT
Start: 2024-06-30 | End: 2024-11-25

## 2024-07-03 ENCOUNTER — OFFICE VISIT (OUTPATIENT)
Dept: HEMATOLOGY/ONCOLOGY | Facility: HOSPITAL | Age: 79
End: 2024-07-03
Payer: MEDICARE

## 2024-07-03 VITALS
OXYGEN SATURATION: 100 % | WEIGHT: 193.78 LBS | BODY MASS INDEX: 28.62 KG/M2 | HEART RATE: 79 BPM | RESPIRATION RATE: 18 BRPM | DIASTOLIC BLOOD PRESSURE: 66 MMHG | TEMPERATURE: 98.1 F | SYSTOLIC BLOOD PRESSURE: 125 MMHG

## 2024-07-03 DIAGNOSIS — C45.9 MALIGNANT NEOPLASM OF MESOTHELIAL TISSUE (MULTI): ICD-10-CM

## 2024-07-03 DIAGNOSIS — R93.89 ABNORMAL FINDINGS ON DIAGNOSTIC IMAGING OF OTHER SPECIFIED BODY STRUCTURES: ICD-10-CM

## 2024-07-03 PROCEDURE — 1157F ADVNC CARE PLAN IN RCRD: CPT | Performed by: INTERNAL MEDICINE

## 2024-07-03 PROCEDURE — 99214 OFFICE O/P EST MOD 30 MIN: CPT | Performed by: INTERNAL MEDICINE

## 2024-07-03 PROCEDURE — 3074F SYST BP LT 130 MM HG: CPT | Performed by: INTERNAL MEDICINE

## 2024-07-03 PROCEDURE — 1123F ACP DISCUSS/DSCN MKR DOCD: CPT | Performed by: INTERNAL MEDICINE

## 2024-07-03 PROCEDURE — 1126F AMNT PAIN NOTED NONE PRSNT: CPT | Performed by: INTERNAL MEDICINE

## 2024-07-03 PROCEDURE — 3078F DIAST BP <80 MM HG: CPT | Performed by: INTERNAL MEDICINE

## 2024-07-03 PROCEDURE — 1159F MED LIST DOCD IN RCRD: CPT | Performed by: INTERNAL MEDICINE

## 2024-07-03 RX ORDER — CREATINE 100 %
5 POWDER (GRAM) MISCELLANEOUS DAILY
COMMUNITY

## 2024-07-03 ASSESSMENT — PAIN SCALES - GENERAL: PAINLEVEL: 0-NO PAIN

## 2024-07-10 ASSESSMENT — ENCOUNTER SYMPTOMS
DIAPHORESIS: 0
CHILLS: 0
ABDOMINAL PAIN: 0
VERTIGO: 0
NUMBNESS: 0
JOINT SWELLING: 0
WEAKNESS: 0
MYALGIAS: 0
ARTHRALGIAS: 1
SWOLLEN GLANDS: 0
NECK PAIN: 0
ANOREXIA: 0
CHANGE IN BOWEL HABIT: 0
SORE THROAT: 0
VISUAL CHANGE: 0
HEADACHES: 0
NAUSEA: 0
FEVER: 0
FATIGUE: 0
VOMITING: 0
COUGH: 0

## 2024-07-10 NOTE — PROGRESS NOTES
Patient ID: Ehsan Dias is a 78 y.o. male.    DIAGNOSIS     Malignant right pleural mesothelioma- epithelioid type.  Date of diagnosis: 4/2/2021 from VATS guided pleural mass biopsy the neoplasm shows solid and papillary growth patterns. IHC of  tumor  cells were POSITIVE for calretinin, WT-1, CK5/6 and D2-40 (weak, focal), and NEGATIVE for CEAm, TTF-1, B72.3 and MOC31,        STAGING     Initial T2N0M0  Progression in chest and peritoneum in January 2022        CURRENT SITES OF DISEASE     pleura, mediastinum, peritoneum        MOLECULAR GENOMICS     MUTYH 48.9%  BAP1 17.5%   Tumor Mutational [place] (TMB): 5.8 m/MB Microsatellite Instability (MSI) Status: Stable           SERUM TUMOR MARKER     Not applicable        PRIOR THERAPY     1- Pleurectomy May 7th , 2021   2- 02.23.2022 : Started on Ipilimumab and Nivolumab. Documented clinical and radiographic response.  Ipilimumab dropped and nivolumab alone was continued starting December 21, 2022 given toxicities of the combination.  Last treated February 2024, completed 2 years of treatment        CURRENT THERAPY     Observation        CURRENT ONCOLOGICAL PROBLEMS     1/ R sided pleuritic chest pain , Improving  2. weight loss- in history it seems like this is intentional (need additional data points in future follow ups) - Gaining weight post surgery  3. dry cough - Significantly improved  4- Polypoid lesion seen on CT scan of the chest within the distal esophagus, referred for endoscopy.,  September 2021  5-  ascites starting Jan 2022.  Paracentesis performed in February suggest malignant ascites from mesothelioma.  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  Seen by rheumatology August 2022, prescribed Celebrex. 1/18/23 - Prednisone taper 10 mg to current 7.5 mg.  Down to prednisone 5 mg with initiation of methotrexate  in July 2023.  Patient notified me on August 23, 2023 that he is not taking his methotrexate and he is fearful of the side effects.  Continues to have polyarthritis symptoms.  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  Due to see ENT.        HISTORY OF PRESENT ILLNESS      His initial presentation was in July of 2018 when he presented with cc of  persistent fever, right sided pleuritic chest pain for a month. He was seen by Dr. Catie Harvey in Pulmonary clinic, routine imaging showed right side pleural effusion.  Diagnostic thoracentesis was performed on 7/13 that showed exudative effusion with neutrophilic predominance, cultures negative, cytology negative for carcinoma.   He completed course oral antibiotics and his pain and effusion resolved.  Again, had recurrence of pleuritic chest pain  and pleural effusion   later in 12/2020. The effusion was found to be worse on repeat chest imaging ( CT Chest in 2/2021). Diagnostic and therapeutic thoracentesis was done which showed exudative effusion with lymphocyte predominance. Given his recurrent ipsilateral moderate  size pleural effusion and near complete collapse of the right lower lobe and mild right middle lobe atelectasis on CT chest- he was referred to CT surgeon and a PET CT was also obtained.      3/2021: PET CT revealed right-sided pleural effusion with multiple hypermetabolic  areas of pleural nodularity visualized along the  pleural surface of the right lung, which predominantly involve the right lung base. In addition, there were multiple FDG avid mediastinal lymph nodes including enlarged subcarinal lymph nodes (maximum SUV  of 4.4), paratracheal lymph nodes (maximum SUV of 3.1), and right hilar  lymph nodes (maximum SUV of 2.9).     3/23/21: was seen by Dr. Dannielle Junior from CT surgery and was scheduled to get R VATS pleural biopsy on 4/2/21: thorascopic exploration showed some adhesions at the diaphragm to the chest wall and also  to the lung.        PAST MEDICAL HISTORY     CAD with MI s/p PCI to RCA with 2 stents,   OA R shoulder s/p replacement,   HTN   elevated PSA with FDG avidity in post lobe of prostate- followed by Dr. 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   former smoker, quit 1969, 2 packs x 8 year  = 16 pack years   smoked marijuana since 1970s; quit few months ago  Possible exposure to asbestos when worked in the [employer].          CURRENT MEDS     see med list         ALLERGIES     nkda         FAMILY HISTORY     no family h/o mesothelioma     Subjective    Cancer  Associated symptoms include arthralgias. Pertinent negatives include no abdominal pain, anorexia, change in bowel habit, chest pain, chills, congestion, coughing, diaphoresis, fatigue, fever, headaches, joint swelling, myalgias, nausea, neck pain, numbness, rash, sore throat, swollen glands, urinary symptoms, vertigo, visual change, vomiting or weakness.         Objective    BSA: 2.07 meters squared  /66 (BP Location: Left arm, Patient Position: Sitting, BP Cuff Size: Adult)   Pulse 79   Temp 36.7 °C (98.1 °F) (Temporal)   Resp 18   Wt 87.9 kg (193 lb 12.6 oz)   SpO2 100%   BMI 28.62 kg/m²      Physical Exam  Constitutional:       General: He is not in acute distress.     Appearance: Normal appearance. He is not ill-appearing,  toxic-appearing or diaphoretic.   HENT:      Nose: No congestion or rhinorrhea.      Mouth/Throat:      Pharynx: No oropharyngeal exudate or posterior oropharyngeal erythema.   Eyes:      General: No scleral icterus.     Conjunctiva/sclera: Conjunctivae normal.   Cardiovascular:      Rate and Rhythm: Normal rate and regular rhythm.      Pulses: Normal pulses.      Heart sounds: Normal heart sounds. No murmur heard.     No friction rub. No gallop.   Pulmonary:      Effort: Pulmonary effort is normal. No respiratory distress.      Breath sounds: Normal breath sounds. No stridor. No wheezing, rhonchi or rales.   Chest:      Chest wall: No tenderness.   Abdominal:      General: Abdomen is flat. Bowel sounds are normal. There is no distension.      Palpations: Abdomen is soft. There is no mass.      Tenderness: There is no abdominal tenderness. There is no guarding or rebound.   Musculoskeletal:      Cervical back: No tenderness.      Right lower leg: No edema.      Left lower leg: No edema.   Lymphadenopathy:      Cervical: No cervical adenopathy.   Skin:     General: Skin is warm.      Coloration: Skin is not jaundiced.      Findings: No bruising.   Neurological:      General: No focal deficit present.      Mental Status: He is oriented to person, place, and time.         Performance Status:  Asymptomatic      Assessment/Plan     This is a 78-year-old gentleman with malignant epithelioid pleural mesothelioma with involvement of the peritoneum who obtained a complete response to combination immunotherapy with ipilimumab and nivolumab.  He received 2 years of treatment from February 2022 through February 2024.  His main immune related adverse events were hypothyroidism as well as a symmetrical inflammatory polyarthritis.  He is being followed for his polyarthritis by rheumatology and he is receiving hormone replacement therapy for his hypothyroidism.  He has been on observation since February 2024 without evidence of  disease progression.  We will see him back in early September 2024 with repeat imaging and blood work.      Cancer Staging   No matching staging information was found for the patient.      Oncology History   Malignant neoplasm of mesothelial tissue (Multi)   2/27/2023 Initial Diagnosis    Malignant neoplasm of mesothelial tissue (CMS/HCC)     10/4/2023 - 2/7/2024 Chemotherapy    Nivolumab 240 mg (Biweekly), 28 Day Cycles                   Shawn Blum MD

## 2024-07-11 ENCOUNTER — APPOINTMENT (OUTPATIENT)
Dept: OPHTHALMOLOGY | Facility: CLINIC | Age: 79
End: 2024-07-11
Payer: MEDICARE

## 2024-07-12 ENCOUNTER — HOSPITAL ENCOUNTER (OUTPATIENT)
Dept: RADIOLOGY | Facility: CLINIC | Age: 79
Discharge: HOME | End: 2024-07-12
Payer: MEDICARE

## 2024-07-12 DIAGNOSIS — M06.4 POLYARTHRITIS, INFLAMMATORY (MULTI): ICD-10-CM

## 2024-07-12 DIAGNOSIS — M81.0 OSTEOPOROSIS: ICD-10-CM

## 2024-07-16 DIAGNOSIS — E03.9 HYPOTHYROIDISM, UNSPECIFIED TYPE: ICD-10-CM

## 2024-07-16 NOTE — TELEPHONE ENCOUNTER
I spoke to patient and explained that his medication should be refilled through his rheumatologist, he explained that [facility] filled it last and he is in the process of finding a new rheumatologist as his left. I explained I will send the script since he is out of medication. He stated he is calling his rheumatologist office now to see a new provider.  Thank you

## 2024-07-17 ENCOUNTER — TELEPHONE (OUTPATIENT)
Dept: RHEUMATOLOGY | Facility: CLINIC | Age: 79
End: 2024-07-17

## 2024-07-17 ENCOUNTER — APPOINTMENT (OUTPATIENT)
Dept: RADIOLOGY | Facility: CLINIC | Age: 79
End: 2024-07-17
Payer: MEDICARE

## 2024-07-17 RX ORDER — LEVOTHYROXINE SODIUM 100 UG/1
100 TABLET ORAL DAILY
Qty: 30 TABLET | Refills: 0 | Status: SHIPPED | OUTPATIENT
Start: 2024-07-17 | End: 2024-07-18 | Stop reason: SDUPTHER

## 2024-07-17 NOTE — TELEPHONE ENCOUNTER
PATIENT IS REQUESTING A REFILL ON LEVOTHYROXINE 100. PLEASE SEND TO [facility] -[phone].

## 2024-07-18 DIAGNOSIS — E03.9 HYPOTHYROIDISM, UNSPECIFIED TYPE: ICD-10-CM

## 2024-07-18 RX ORDER — LEVOTHYROXINE SODIUM 100 UG/1
100 TABLET ORAL DAILY
Qty: 100 TABLET | Refills: 1 | Status: SHIPPED | OUTPATIENT
Start: 2024-07-18

## 2024-07-19 ENCOUNTER — HOSPITAL ENCOUNTER (OUTPATIENT)
Dept: RADIOLOGY | Facility: CLINIC | Age: 79
Discharge: HOME | End: 2024-07-19
Payer: MEDICARE

## 2024-07-19 PROCEDURE — 77080 DXA BONE DENSITY AXIAL: CPT

## 2024-07-19 ASSESSMENT — LIFESTYLE VARIABLES
CURRENT_SMOKER: N
3_OR_MORE_DRINKS_PER_DAY: N

## 2024-07-25 ENCOUNTER — APPOINTMENT (OUTPATIENT)
Dept: PRIMARY CARE | Facility: CLINIC | Age: 79
End: 2024-07-25
Payer: MEDICARE

## 2024-08-22 ENCOUNTER — HOSPITAL ENCOUNTER (OUTPATIENT)
Dept: RADIOLOGY | Facility: CLINIC | Age: 79
Discharge: HOME | End: 2024-08-22
Payer: MEDICARE

## 2024-08-22 DIAGNOSIS — J06.9 ACUTE RESPIRATORY DISEASE: ICD-10-CM

## 2024-08-22 PROCEDURE — 71046 X-RAY EXAM CHEST 2 VIEWS: CPT | Mod: FOREIGN READ | Performed by: RADIOLOGY

## 2024-08-22 PROCEDURE — 71046 X-RAY EXAM CHEST 2 VIEWS: CPT

## 2024-09-03 ENCOUNTER — HOSPITAL ENCOUNTER (OUTPATIENT)
Dept: RADIOLOGY | Facility: CLINIC | Age: 79
Discharge: HOME | End: 2024-09-03
Payer: MEDICARE

## 2024-09-03 DIAGNOSIS — C45.9 MALIGNANT NEOPLASM OF MESOTHELIAL TISSUE (MULTI): ICD-10-CM

## 2024-09-03 PROCEDURE — 74150 CT ABDOMEN W/O CONTRAST: CPT | Performed by: RADIOLOGY

## 2024-09-03 PROCEDURE — 74150 CT ABDOMEN W/O CONTRAST: CPT

## 2024-09-03 PROCEDURE — 71250 CT THORAX DX C-: CPT | Performed by: RADIOLOGY

## 2024-09-03 PROCEDURE — 71250 CT THORAX DX C-: CPT

## 2024-09-09 ENCOUNTER — APPOINTMENT (OUTPATIENT)
Dept: HEMATOLOGY/ONCOLOGY | Facility: HOSPITAL | Age: 79
End: 2024-09-09
Payer: MEDICARE

## 2024-09-17 DIAGNOSIS — M06.4 POLYARTHRITIS, INFLAMMATORY (MULTI): ICD-10-CM

## 2024-09-18 ENCOUNTER — OFFICE VISIT (OUTPATIENT)
Dept: HEMATOLOGY/ONCOLOGY | Facility: HOSPITAL | Age: 79
End: 2024-09-18
Payer: MEDICARE

## 2024-09-18 VITALS
WEIGHT: 186.4 LBS | TEMPERATURE: 98.2 F | SYSTOLIC BLOOD PRESSURE: 137 MMHG | DIASTOLIC BLOOD PRESSURE: 61 MMHG | OXYGEN SATURATION: 100 % | RESPIRATION RATE: 16 BRPM | HEART RATE: 86 BPM | BODY MASS INDEX: 27.53 KG/M2

## 2024-09-18 DIAGNOSIS — R93.812 ABNORMAL RADIOLOGIC FINDINGS ON DIAGNOSTIC IMAGING OF LEFT TESTICLE: ICD-10-CM

## 2024-09-18 DIAGNOSIS — C45.9 MALIGNANT NEOPLASM OF MESOTHELIAL TISSUE (MULTI): ICD-10-CM

## 2024-09-18 PROCEDURE — 3078F DIAST BP <80 MM HG: CPT | Performed by: INTERNAL MEDICINE

## 2024-09-18 PROCEDURE — 1125F AMNT PAIN NOTED PAIN PRSNT: CPT | Performed by: INTERNAL MEDICINE

## 2024-09-18 PROCEDURE — 1159F MED LIST DOCD IN RCRD: CPT | Performed by: INTERNAL MEDICINE

## 2024-09-18 PROCEDURE — 1157F ADVNC CARE PLAN IN RCRD: CPT | Performed by: INTERNAL MEDICINE

## 2024-09-18 PROCEDURE — 99215 OFFICE O/P EST HI 40 MIN: CPT | Performed by: INTERNAL MEDICINE

## 2024-09-18 PROCEDURE — 1123F ACP DISCUSS/DSCN MKR DOCD: CPT | Performed by: INTERNAL MEDICINE

## 2024-09-18 PROCEDURE — 3075F SYST BP GE 130 - 139MM HG: CPT | Performed by: INTERNAL MEDICINE

## 2024-09-18 ASSESSMENT — PAIN SCALES - GENERAL: PAINLEVEL: 5

## 2024-09-18 NOTE — PROGRESS NOTES
Patient ID: Ehsan Dias is a 79 y.o. male.    DIAGNOSIS     Malignant right pleural mesothelioma- epithelioid type.  Date of diagnosis: 4/2/2021 from VATS guided pleural mass biopsy the neoplasm shows solid and papillary growth patterns. IHC of  tumor  cells were POSITIVE for calretinin, WT-1, CK5/6 and D2-40 (weak, focal), and NEGATIVE for CEAm, TTF-1, B72.3 and MOC31,        STAGING     Initial T2N0M0  Progression in chest and peritoneum in January 2022        CURRENT SITES OF DISEASE     pleura, mediastinum, peritoneum        MOLECULAR GENOMICS     MUTYH 48.9%  BAP1 17.5%   Tumor Mutational [place] (TMB): 5.8 m/MB Microsatellite Instability (MSI) Status: Stable           SERUM TUMOR MARKER     Not applicable        PRIOR THERAPY     1- Pleurectomy May 7th , 2021   2- 02.23.2022 : Started on Ipilimumab and Nivolumab. Documented clinical and radiographic response.  Ipilimumab dropped and nivolumab alone was continued starting December 21, 2022 given toxicities of the combination.  Last treated February 2024, completed 2 years of treatment        CURRENT THERAPY     Observation        CURRENT ONCOLOGICAL PROBLEMS     1/ R sided pleuritic chest pain , Improving  2. weight loss- in history it seems like this is intentional (need additional data points in future follow ups) - Gaining weight post surgery  3. dry cough - Significantly improved  4- Polypoid lesion seen on CT scan of the chest within the distal esophagus, referred for endoscopy.,  September 2021  5-  ascites starting Jan 2022.  Paracentesis performed in February suggest malignant ascites from mesothelioma.  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  Seen by rheumatology August 2022, prescribed Celebrex. 1/18/23 - Prednisone taper 10 mg to current 7.5 mg.  Down to prednisone 5 mg with initiation of methotrexate  in July 2023.  Patient notified me on August 23, 2023 that he is not taking his methotrexate and he is fearful of the side effects.  Continues to have polyarthritis symptoms.  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  Due to see ENT.  14- Increasing left anterior sacroiliac left inguinal left hip and pelvic pain August and September 2024.  Ordered MRI of the pelvis.        HISTORY OF PRESENT ILLNESS      His initial presentation was in July of 2018 when he presented with cc of  persistent fever, right sided pleuritic chest pain for a month. He was seen by Dr. Catie Harvey in Pulmonary clinic, routine imaging showed right side pleural effusion.  Diagnostic thoracentesis was performed on 7/13 that showed exudative effusion with neutrophilic predominance, cultures negative, cytology negative for carcinoma.   He completed course oral antibiotics and his pain and effusion resolved.  Again, had recurrence of pleuritic chest pain  and pleural effusion   later in 12/2020. The effusion was found to be worse on repeat chest imaging ( CT Chest in 2/2021). Diagnostic and therapeutic thoracentesis was done which showed exudative effusion with lymphocyte predominance. Given his recurrent ipsilateral moderate  size pleural effusion and near complete collapse of the right lower lobe and mild right middle lobe atelectasis on CT chest- he was referred to CT surgeon and a PET CT was also obtained.       3/2021: PET CT revealed right-sided pleural effusion with multiple hypermetabolic  areas of pleural nodularity visualized along the pleural surface of the right lung, which predominantly involve the right lung base. In addition, there were multiple FDG avid mediastinal lymph nodes including enlarged subcarinal lymph nodes (maximum SUV  of 4.4), paratracheal lymph nodes (maximum SUV of 3.1), and right hilar  lymph nodes (maximum SUV of 2.9).     3/23/21: was seen by Dr. Dannielle Junior from CT surgery and was scheduled to get R VATS pleural biopsy on 4/2/21: thorascopic exploration showed some adhesions at the diaphragm to the chest wall and also  to the lung.        PAST MEDICAL HISTORY     CAD with MI s/p PCI to RCA with 2 stents,   OA R shoulder s/p replacement,   HTN   elevated PSA with FDG avidity in post lobe of prostate- followed by Dr. 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   former smoker, quit 1969, 2 packs x 8 year  = 16 pack years   smoked marijuana since 1970s; quit few months ago  Possible exposure to asbestos when worked in the [employer]          CURRENT MEDS     see med list         ALLERGIES     nkda         FAMILY HISTORY     no family h/o mesothelioma     Subjective    Cancer  Associated symptoms include arthralgias. Pertinent negatives include no abdominal pain, anorexia, change in bowel habit, chest pain, chills, congestion, coughing, diaphoresis, fatigue, fever, headaches, joint swelling, myalgias, nausea, neck pain, numbness, rash, sore throat, swollen glands, urinary symptoms, vertigo, visual change, vomiting or weakness.       Patient has been noticing some increasing left pelvic pain that he notes in his left sacroiliac area and left inguinal area.  It has been going on for the past month and has been gradually increasing.  He has had to now take some pain medications for it.   Otherwise he is feeling well.  He is appetite is good, he has bilateral chronic shoulder pain, no fever no night sweats, no shortness of breath cough hemoptysis, no abdominal distention or other GI symptoms.      Objective    BSA: 2.03 meters squared  /61 (BP Location: Right arm, Patient Position: Sitting)   Pulse 86   Temp 36.8 °C (98.2 °F) (Temporal)   Resp 16   Wt 84.6 kg (186 lb 6.4 oz)   SpO2 100%   BMI 27.53 kg/m²      Physical Exam  Constitutional:       General: He is not in acute distress.     Appearance: Normal appearance. He is not ill-appearing, toxic-appearing or diaphoretic.   HENT:      Nose: No congestion or rhinorrhea.   Eyes:      General: No scleral icterus.     Conjunctiva/sclera: Conjunctivae normal.   Cardiovascular:      Rate and Rhythm: Normal rate and regular rhythm.      Pulses: Normal pulses.      Heart sounds: Normal heart sounds. No murmur heard.     No friction rub. No gallop.   Pulmonary:      Effort: No respiratory distress.      Breath sounds: No stridor. No wheezing, rhonchi or rales.   Chest:      Chest wall: No tenderness.   Abdominal:      General: Abdomen is flat. Bowel sounds are normal. There is no distension.      Palpations: Abdomen is soft. There is no mass.      Tenderness: There is no abdominal tenderness. There is no guarding or rebound.   Musculoskeletal:      Cervical back: No tenderness.      Right lower leg: No edema.      Left lower leg: No edema.   Lymphadenopathy:      Cervical: Cervical adenopathy present.   Skin:     General: Skin is warm.      Coloration: Skin is not jaundiced or pale.      Findings: No bruising or erythema.   Neurological:      General: No focal deficit present.      Mental Status: He is oriented to person, place, and time.   Psychiatric:         Mood and Affect: Mood normal.         Behavior: Behavior normal.         Performance Status:  Symptomatic; fully ambulatory      CT ABDOMEN WO IV CONTRAST; CT CHEST WO IV CONTRAST;   9/3/2024   IMPRESSION:  CHEST AND ABDOMEN:  Stable postsurgical changes in the right lower chest with adjacent  pleural-parenchymal opacities. No significant change since  05/07/2024. No evidence of new/emerging metastatic disease within  limits of unenhanced exam.      Assessment/Plan     This is a patient with a right pleural mesothelioma, epithelioid type, with involvement of the peritoneum who had a complete response to combination immunotherapy which she completed 2 years of treatment in February 2024.  He is clinically doing well however 2 items were discussed today.    The patient has not had his blood work for a while and I talked about the importance of this.  He has been skipping those of blood tests.  And his blood work is from May 2024.  We reordered them hoping them he will get this at this time.    Given his increasing pelvic pain over the past month that has been gradually worsening and not getting any better we will order an MRI of his pelvis.  That we will get a better look up inside his pelvis (given the fact that he has had intra-abdominal disease in the past but also looking at his left pelvis and left hip.)      Cancer Staging   No matching staging information was found for the patient.      Oncology History   Malignant neoplasm of mesothelial tissue (Multi)   2/27/2023 Initial Diagnosis    Malignant neoplasm of mesothelial tissue (CMS/HCC)     10/4/2023 - 2/7/2024 Chemotherapy    Nivolumab 240 mg (Biweekly), 28 Day Cycles                   Shawn Blum MD

## 2024-09-19 RX ORDER — LANOLIN ALCOHOL/MO/W.PET/CERES
1 CREAM (GRAM) TOPICAL DAILY
Qty: 90 TABLET | Refills: 0 | Status: SHIPPED | OUTPATIENT
Start: 2024-09-19

## 2024-09-19 ASSESSMENT — ENCOUNTER SYMPTOMS
JOINT SWELLING: 0
COUGH: 0
DIAPHORESIS: 0
NAUSEA: 0
VISUAL CHANGE: 0
ANOREXIA: 0
VERTIGO: 0
FATIGUE: 0
WEAKNESS: 0
NECK PAIN: 0
CHILLS: 0
FEVER: 0
SORE THROAT: 0
HEADACHES: 0
SWOLLEN GLANDS: 0
VOMITING: 0
MYALGIAS: 0
CHANGE IN BOWEL HABIT: 0
NUMBNESS: 0
ABDOMINAL PAIN: 0
ARTHRALGIAS: 1

## 2024-09-23 PROBLEM — R91.8 ABNORMAL FINDINGS ON DIAGNOSTIC IMAGING OF LUNG: Status: ACTIVE | Noted: 2024-09-23

## 2024-09-23 PROBLEM — K59.00 CONSTIPATION: Status: ACTIVE | Noted: 2024-09-23

## 2024-09-23 PROBLEM — J20.9 ACUTE BRONCHITIS: Status: ACTIVE | Noted: 2024-09-23

## 2024-09-23 PROBLEM — G45.9 TRANSIENT ISCHEMIC ATTACK: Status: ACTIVE | Noted: 2024-09-23

## 2024-09-23 PROBLEM — K12.0 APHTHOUS ULCER OF TONGUE: Status: ACTIVE | Noted: 2024-04-17

## 2024-09-23 PROBLEM — R05.3 PERSISTENT COUGH: Status: ACTIVE | Noted: 2024-09-23

## 2024-09-23 PROBLEM — J06.9 ACUTE UPPER RESPIRATORY INFECTION: Status: ACTIVE | Noted: 2024-09-23

## 2024-09-23 PROBLEM — Z86.79 HISTORY OF ANGINA PECTORIS: Status: ACTIVE | Noted: 2024-09-23

## 2024-09-23 PROBLEM — F66 GENDER IDENTITY UNCERTAINTY IN ADULT: Chronic | Status: RESOLVED | Noted: 2023-09-15 | Resolved: 2024-09-23

## 2024-09-23 PROBLEM — D75.839 THROMBOCYTHEMIA: Status: ACTIVE | Noted: 2024-09-23

## 2024-09-23 PROBLEM — R91.8 ABNORMAL FINDINGS ON DIAGNOSTIC IMAGING OF LUNG: Status: RESOLVED | Noted: 2024-09-23 | Resolved: 2024-09-23

## 2024-09-25 ENCOUNTER — APPOINTMENT (OUTPATIENT)
Dept: RHEUMATOLOGY | Facility: CLINIC | Age: 79
End: 2024-09-25
Payer: MEDICARE

## 2024-09-25 ENCOUNTER — LAB (OUTPATIENT)
Dept: LAB | Facility: LAB | Age: 79
End: 2024-09-25
Payer: MEDICARE

## 2024-09-25 ENCOUNTER — HOSPITAL ENCOUNTER (OUTPATIENT)
Dept: RADIOLOGY | Facility: CLINIC | Age: 79
Discharge: HOME | End: 2024-09-25
Payer: MEDICARE

## 2024-09-25 VITALS
HEIGHT: 69 IN | SYSTOLIC BLOOD PRESSURE: 117 MMHG | TEMPERATURE: 98.2 F | HEART RATE: 83 BPM | BODY MASS INDEX: 27.7 KG/M2 | DIASTOLIC BLOOD PRESSURE: 75 MMHG | WEIGHT: 187 LBS

## 2024-09-25 DIAGNOSIS — C45.9 MALIGNANT NEOPLASM OF MESOTHELIAL TISSUE (MULTI): ICD-10-CM

## 2024-09-25 DIAGNOSIS — R93.89 ABNORMAL FINDINGS ON DIAGNOSTIC IMAGING OF OTHER SPECIFIED BODY STRUCTURES: ICD-10-CM

## 2024-09-25 DIAGNOSIS — M81.0 OSTEOPOROSIS, UNSPECIFIED OSTEOPOROSIS TYPE, UNSPECIFIED PATHOLOGICAL FRACTURE PRESENCE: ICD-10-CM

## 2024-09-25 DIAGNOSIS — M06.9 RHEUMATOID ARTHRITIS IN REMISSION (MULTI): Primary | ICD-10-CM

## 2024-09-25 DIAGNOSIS — M06.9 RHEUMATOID ARTHRITIS IN REMISSION (MULTI): ICD-10-CM

## 2024-09-25 DIAGNOSIS — R93.812 ABNORMAL RADIOLOGIC FINDINGS ON DIAGNOSTIC IMAGING OF LEFT TESTICLE: ICD-10-CM

## 2024-09-25 DIAGNOSIS — D89.89 OTHER SPECIFIED DISORDERS INVOLVING THE IMMUNE MECHANISM, NOT ELSEWHERE CLASSIFIED: ICD-10-CM

## 2024-09-25 DIAGNOSIS — M81.0 OSTEOPOROSIS: ICD-10-CM

## 2024-09-25 LAB
25(OH)D3 SERPL-MCNC: 53 NG/ML (ref 30–100)
ALBUMIN SERPL BCP-MCNC: 4.2 G/DL (ref 3.4–5)
ALP SERPL-CCNC: 146 U/L (ref 33–136)
ALT SERPL W P-5'-P-CCNC: 22 U/L (ref 10–52)
ANION GAP SERPL CALC-SCNC: 13 MMOL/L (ref 10–20)
AST SERPL W P-5'-P-CCNC: 20 U/L (ref 9–39)
BASOPHILS # BLD AUTO: 0.06 X10*3/UL (ref 0–0.1)
BASOPHILS NFR BLD AUTO: 0.8 %
BILIRUB SERPL-MCNC: 0.6 MG/DL (ref 0–1.2)
BUN SERPL-MCNC: 20 MG/DL (ref 6–23)
CALCIUM SERPL-MCNC: 9.5 MG/DL (ref 8.6–10.6)
CHLORIDE SERPL-SCNC: 102 MMOL/L (ref 98–107)
CO2 SERPL-SCNC: 30 MMOL/L (ref 21–32)
CORTIS SERPL-MCNC: 10.6 UG/DL (ref 2.5–20)
CORTIS SERPL-MCNC: 10.7 UG/DL (ref 2.5–20)
CREAT SERPL-MCNC: 0.86 MG/DL (ref 0.5–1.3)
CRP SERPL-MCNC: 0.7 MG/DL
EGFRCR SERPLBLD CKD-EPI 2021: 88 ML/MIN/1.73M*2
EOSINOPHIL # BLD AUTO: 0.1 X10*3/UL (ref 0–0.4)
EOSINOPHIL NFR BLD AUTO: 1.4 %
ERYTHROCYTE [DISTWIDTH] IN BLOOD BY AUTOMATED COUNT: 14.5 % (ref 11.5–14.5)
FOLATE SERPL-MCNC: 23 NG/ML
GLUCOSE SERPL-MCNC: 93 MG/DL (ref 74–99)
HCT VFR BLD AUTO: 42.5 % (ref 41–52)
HGB BLD-MCNC: 13.8 G/DL (ref 13.5–17.5)
IMM GRANULOCYTES # BLD AUTO: 0.03 X10*3/UL (ref 0–0.5)
IMM GRANULOCYTES NFR BLD AUTO: 0.4 % (ref 0–0.9)
LYMPHOCYTES # BLD AUTO: 1.11 X10*3/UL (ref 0.8–3)
LYMPHOCYTES NFR BLD AUTO: 15.7 %
MCH RBC QN AUTO: 31.4 PG (ref 26–34)
MCHC RBC AUTO-ENTMCNC: 32.5 G/DL (ref 32–36)
MCV RBC AUTO: 97 FL (ref 80–100)
MONOCYTES # BLD AUTO: 0.67 X10*3/UL (ref 0.05–0.8)
MONOCYTES NFR BLD AUTO: 9.4 %
NEUTROPHILS # BLD AUTO: 5.12 X10*3/UL (ref 1.6–5.5)
NEUTROPHILS NFR BLD AUTO: 72.3 %
NRBC BLD-RTO: 0 /100 WBCS (ref 0–0)
PLATELET # BLD AUTO: 335 X10*3/UL (ref 150–450)
POTASSIUM SERPL-SCNC: 4.4 MMOL/L (ref 3.5–5.3)
PROT SERPL-MCNC: 6.7 G/DL (ref 6.4–8.2)
RBC # BLD AUTO: 4.4 X10*6/UL (ref 4.5–5.9)
SODIUM SERPL-SCNC: 141 MMOL/L (ref 136–145)
TSH SERPL-ACNC: 0.58 MIU/L (ref 0.44–3.98)
VIT B12 SERPL-MCNC: 449 PG/ML (ref 211–911)
WBC # BLD AUTO: 7.1 X10*3/UL (ref 4.4–11.3)

## 2024-09-25 PROCEDURE — 3074F SYST BP LT 130 MM HG: CPT | Performed by: INTERNAL MEDICINE

## 2024-09-25 PROCEDURE — 82746 ASSAY OF FOLIC ACID SERUM: CPT

## 2024-09-25 PROCEDURE — 73503 X-RAY EXAM HIP UNI 4/> VIEWS: CPT | Mod: LEFT SIDE | Performed by: RADIOLOGY

## 2024-09-25 PROCEDURE — 85025 COMPLETE CBC W/AUTO DIFF WBC: CPT

## 2024-09-25 PROCEDURE — 1123F ACP DISCUSS/DSCN MKR DOCD: CPT | Performed by: INTERNAL MEDICINE

## 2024-09-25 PROCEDURE — 82607 VITAMIN B-12: CPT

## 2024-09-25 PROCEDURE — 84443 ASSAY THYROID STIM HORMONE: CPT

## 2024-09-25 PROCEDURE — 1159F MED LIST DOCD IN RCRD: CPT | Performed by: INTERNAL MEDICINE

## 2024-09-25 PROCEDURE — 1157F ADVNC CARE PLAN IN RCRD: CPT | Performed by: INTERNAL MEDICINE

## 2024-09-25 PROCEDURE — 80053 COMPREHEN METABOLIC PANEL: CPT

## 2024-09-25 PROCEDURE — 80400 ACTH STIMULATION PANEL: CPT

## 2024-09-25 PROCEDURE — 99214 OFFICE O/P EST MOD 30 MIN: CPT | Performed by: INTERNAL MEDICINE

## 2024-09-25 PROCEDURE — 82306 VITAMIN D 25 HYDROXY: CPT

## 2024-09-25 PROCEDURE — 3078F DIAST BP <80 MM HG: CPT | Performed by: INTERNAL MEDICINE

## 2024-09-25 PROCEDURE — 84402 ASSAY OF FREE TESTOSTERONE: CPT

## 2024-09-25 PROCEDURE — 36415 COLL VENOUS BLD VENIPUNCTURE: CPT

## 2024-09-25 PROCEDURE — 82024 ASSAY OF ACTH: CPT

## 2024-09-25 PROCEDURE — 1036F TOBACCO NON-USER: CPT | Performed by: INTERNAL MEDICINE

## 2024-09-25 PROCEDURE — 86140 C-REACTIVE PROTEIN: CPT

## 2024-09-25 PROCEDURE — 73503 X-RAY EXAM HIP UNI 4/> VIEWS: CPT | Mod: LT

## 2024-09-25 ASSESSMENT — ENCOUNTER SYMPTOMS
ARTHRALGIAS: 1
HEMATOLOGIC/LYMPHATIC NEGATIVE: 1
ALLERGIC/IMMUNOLOGIC NEGATIVE: 1
CARDIOVASCULAR NEGATIVE: 1
PSYCHIATRIC NEGATIVE: 1
CONSTITUTIONAL NEGATIVE: 1
NEUROLOGICAL NEGATIVE: 1
GASTROINTESTINAL NEGATIVE: 1
EYES NEGATIVE: 1

## 2024-09-25 NOTE — PROGRESS NOTES
Subjective   Patient ID: [id number]   Ehsan Dias is a 79 y.o. male who presents for Rheumatoid Arthritis.  HPI  Follow up for ICI induced arthritis     Current IS   Methotrexate 25 mg weekly increased last visit  Now decreased to 20 mg weekly bt himself about 2 weeks ago  Folic acid daily    RECALL  This is a 77-year-old male who presents to [facility]. Past medical history includes that of hypertension, hyperlipidemia, hypothyroidism, coronary artery disease and most importantly, mesothelioma. For mesothelioma, he follows with oncologist. He was diagnosed on 4/2021, about 1 year ago, he was started on combination of ipilimumab and nivolumab. He developed significant joint pain/inflammatory arthritis 6 months later. Thereafter, ipilimumab was discontinued. He was started on Plaquenil. At the same time, he developed a skin rash and it was not certain whether this was related to Plaquenil or ipilimumab. Irrespective, the Plaquenil was discontinued. He was treated with prednisone and reports that the joint symptoms are well with prednisone 10 mg a day.   At his recent follow-up with his rheumatologist, Dr. Segura, it was advised that he consider reducing the dose of prednisone.  Today, patient mentions that he has been taking prednisone 7.5 mg a day and continues to have some discomfort on his MCPs as well as wrist joints. He reports protracted morning stiffness. He does not report much discomfort in other joints including feet, knees, ankles, shoulders or elbows. He does not report much discomfort on neck or back. He does have a history of right shoulder replacement. Patient questions whether it is reasonable to take prednisone 10 mg a day on a regular basis.      ICI history for pleural mesothelioma  Started on Ipilimumab and Nivolumab. Documented clinical and radiographic response.  Ipilimumab dropped and nivolumab alone was continued starting December 21, 2022 given toxicities of the combination.  Last  treated February 2024, completed 2 years of treatment      Interval Symptoms  c/o MST in the am, some achiness when make a full fist  Has not noticed any swelling able to make full fist  Able to work out in the gym 3 times / week  Has been having pain in the left hip for a weeks, was over exerting himself then pain progressed  Now walks with a limp, and taking NSAID almost daily.     Review of Systems  Constitutional: Denies fever, chills   Eyes: Denies dry eyes, blurry vision, redness of the eyes, pain in the eyes   ENT: Denies dry mouth, loss of taste, sores in the mouth, or difficulty swallowing   Cardiovascular: Denies chest pain, palpitations   Respiratory: Denies shortness of breath   Gastrointestinal: Denies changes in bowl or bladder function, nausea, vomiting, heartburn   Integumentary: Denies photosensitivity, rash or lesions, Raynaud's   Neurological: Denies any numbness or tingling   Hematologic/Lymphatic: Denies bleeding, alopecia, Raynaud's phenomena       Review of Systems   Constitutional: Negative.    HENT: Negative.     Eyes: Negative.    Cardiovascular: Negative.    Gastrointestinal: Negative.    Genitourinary: Negative.    Musculoskeletal:  Positive for arthralgias.   Skin: Negative.    Allergic/Immunologic: Negative.    Neurological: Negative.    Hematological: Negative.    Psychiatric/Behavioral: Negative.         Objective   Physical Exam  Physical Exam  No synovitis  Grade 1/2 synovial proliferation of the right radiocarpal joint  Absent doppler  No synovitis in the MCP    Restricted hip movement Lt side  Severely limited adduction and abduction  Tenderness with FADIR and STANLEY in the groin    Rest of exam as below    General: Cooperative, in NAD   Eyes: Conjunctiva clear, sclera white, anicteric   Nose: No external deformity, no mucosal crusting or signs of bleeding   Throat/Mouth: Moist mucous membranes, normal dentition, no ulcers or lesions   Lungs: No respiratory distress; lungs CTAB; no  wheezes, rales, rhonchi, or stridor   Heart: Normal S1 and S2; regular rate and rhythm; no murmurs, rubs, or gallops  Skin: No rashes, ulcers, tophi, or nodules noted  MSK:   Spine: Normal posture, no point tenderness to palpation, normal ROM  Upper Extremities:   Hands: Tender 2nd bilaterally with minimal synovial thickening  Wrists:  Tender wrist - extensor aspect wo swelling b/l good ROM  Tinnels +ve     Elbows: No erythema, warmth, synovitis, or pain; normal ROM   Shoulders: No erythema, warmth, appreciable swelling, or pain; normal ROM   Lower Extremities:   Hips: No gross deformity, erythema, warmth, or pain; normal ROM   Knees: No erythema, warmth, swelling, or pain; normal ROM   Ankles: No erythema, warmth, synovitis, or pain; normal ROM  Feet: No gross deformity, erythema, or swelling; MTP squeeze negative bilaterally     Lab Results   Component Value Date    WBC 7.0 05/03/2024    HGB 14.2 05/03/2024    HCT 43.6 05/03/2024    MCV 98 05/03/2024     05/03/2024      Lab Results   Component Value Date    GLUCOSE 78 05/03/2024    CALCIUM 9.9 05/03/2024     05/03/2024    K 4.1 05/03/2024    CO2 31 05/03/2024     05/03/2024    BUN 21 05/03/2024    CREATININE 0.82 05/03/2024      Assessment/Plan   Diagnoses and all orders for this visit:  Polyarthritis, inflammatory (CMS/HCC)  ICI induced arthritis ( seronegative RA)  Grade 2 iAER  CDAI 3 today ( 0 TJC, 0 SJC)  Previously upto 6 TJC ( last visit 2 TJC with moderate CDAI)  Now in remission- c/o some achiness in the hands  But no synovitis on exam and by ultrasound - only grade 1/2 synovial proliferation in the radiocarpal joint seen with absent doppler    However, he has severely restricted movement of the left hip   He is scheduled to have MRI of the hip in 2 weeks by [facility]  Ordered Xray today - ultrasound not performed today  If Xray suggestive of severe OA, will offer US guided CSI and refer to orthopedic colleagues    Osteopenia with High  Frax  But wants to wait before starting anti resoprtives  Risks vs benefits explained  He is actively working out and taking Vit D and Ca consistently.  Now off prednisone for many months    Hx of CTS - predominantly left hand  Currently , asymptomatic  No nocturnal symptoms  No weakness on exam today    HM-  UTD with vaccines  Advised to get shingles and RSV vaccines.  Follows with hemonc for hx of mesothelioma     Surveillance labs ordered    MD Elder Farrell MD   of Medicine  [facility] - Department of Rheumatology  [facility]

## 2024-09-25 NOTE — PATIENT INSTRUCTIONS
Please take tylenol extra strength 500 mg x 2 tablets upto 3 times a day    Continue on methotrexate and folic same for now  Ordering hip xray and labs    Follow up 4-5 months

## 2024-09-27 LAB — ACTH PLAS-MCNC: 32.5 PG/ML (ref 7.2–63.3)

## 2024-09-29 LAB
TESTOSTERONE FREE (CHAN): 49.6 PG/ML (ref 30–135)
TESTOSTERONE,TOTAL,LC-MS/MS: 310 NG/DL (ref 250–1100)

## 2024-10-03 ENCOUNTER — HOSPITAL ENCOUNTER (OUTPATIENT)
Dept: RADIOLOGY | Facility: CLINIC | Age: 79
Discharge: HOME | End: 2024-10-03
Payer: MEDICARE

## 2024-10-03 VITALS — WEIGHT: 190 LBS | HEIGHT: 69 IN | BODY MASS INDEX: 28.14 KG/M2

## 2024-10-03 DIAGNOSIS — M06.4 POLYARTHRITIS, INFLAMMATORY (MULTI): ICD-10-CM

## 2024-10-03 DIAGNOSIS — C45.9 MALIGNANT NEOPLASM OF MESOTHELIAL TISSUE (MULTI): ICD-10-CM

## 2024-10-03 PROCEDURE — 73030 X-RAY EXAM OF SHOULDER: CPT | Mod: LT

## 2024-10-03 PROCEDURE — 2550000001 HC RX 255 CONTRASTS: Performed by: INTERNAL MEDICINE

## 2024-10-03 PROCEDURE — 72197 MRI PELVIS W/O & W/DYE: CPT

## 2024-10-03 PROCEDURE — A9575 INJ GADOTERATE MEGLUMI 0.1ML: HCPCS | Performed by: INTERNAL MEDICINE

## 2024-10-03 PROCEDURE — 72050 X-RAY EXAM NECK SPINE 4/5VWS: CPT

## 2024-10-03 RX ORDER — GADOTERATE MEGLUMINE 376.9 MG/ML
0.2 INJECTION INTRAVENOUS
Status: COMPLETED | OUTPATIENT
Start: 2024-10-03 | End: 2024-10-03

## 2024-10-14 PROBLEM — K12.0 APHTHOUS ULCER OF TONGUE: Status: RESOLVED | Noted: 2024-04-17 | Resolved: 2024-10-14

## 2024-10-14 PROBLEM — G45.9 TRANSIENT ISCHEMIC ATTACK: Chronic | Status: ACTIVE | Noted: 2024-09-23

## 2024-10-14 PROBLEM — K59.00 CONSTIPATION: Status: RESOLVED | Noted: 2024-09-23 | Resolved: 2024-10-14

## 2024-10-14 PROBLEM — J01.10 ACUTE NON-RECURRENT FRONTAL SINUSITIS: Status: RESOLVED | Noted: 2024-02-21 | Resolved: 2024-10-14

## 2024-10-14 PROBLEM — J20.9 ACUTE BRONCHITIS: Status: RESOLVED | Noted: 2024-09-23 | Resolved: 2024-10-14

## 2024-10-14 PROBLEM — J06.9 ACUTE UPPER RESPIRATORY INFECTION: Status: RESOLVED | Noted: 2024-09-23 | Resolved: 2024-10-14

## 2024-10-14 NOTE — PROGRESS NOTES
Referred by No ref. provider found    TABITHA Moser is here for follow-up.  Feeling well.  Tolerating lisinopril.  Blood pressures at home have been excellent     Past Medical History:  Problem List Items Addressed This Visit    None     Past Medical History:   Diagnosis Date    Abdominal cramping 02/27/2023    Anemia due to blood loss 09/15/2023    Atherosclerosis of aorta (CMS-HCC) 10/10/2023    CAD (coronary artery disease) 02/27/2023    Inferior wall MI, s/p FROY x2 to RCA. 70% on revascularized LAD. 2001 [facility].    Depression 09/15/2023    Edema, lower extremity 09/15/2023    Gender identity uncertainty in adult 09/15/2023    Hypercholesterolemia 02/27/2023    Dr. 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    Other fatigue 09/15/2023    Personal history of diseases of the blood and blood-forming organs and certain disorders involving the immune mechanism 07/20/2017    History of thrombocytosis    Personal history of malignant neoplasm of soft tissue 11/18/2021    History of malignant mesothelioma    Personal history of other diseases of male genital organs 11/11/2019    History of impotence    Personal history of transient ischemic attack (TIA), and cerebral infarction without residual deficits 12/14/2021    History of transient ischemic attack    Pleural mass 02/27/2023    Recurrent major depressive disorder, in partial remission (CMS-HCC) 02/27/2023    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    Skin irritation 09/15/2023    SOB (shortness of breath) on exertion 09/15/2023    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      Social History:  He reports that he quit smoking about 55 years ago. His smoking use included cigarettes. He has been exposed to tobacco smoke. He has never used smokeless tobacco. He reports that he does not currently use alcohol. He reports that he does not currently use drugs.    Family History:  Family History   Problem Relation Name Age of Onset    Heart disease Mother      Other (cardiac disorder) Mother      Heart disease Father      Other (cardiac disorder) Father      Coronary artery disease Brother       Allergies:  Doxycycline, Hydroxychloroquine, Clopidogrel, and Dyclonine    Outpatient Medications:  Current Outpatient Medications   Medication Instructions    aspirin 81 mg EC tablet oral    atorvastatin (LIPITOR) 80 mg, oral, Daily    b complex 0.4 mg tablet 1 tablet, oral, Daily    calcipotriene (Dovonex) 0.005 % cream Topical, 2 times daily    CASEIN-MILK,NFAT,SKIM-PALM OIL ORAL oral    creatine (bulk) 5 g, miscellaneous, Daily    creatinine, bulk, 100 % powder miscellaneous    dilTIAZem CD (CARDIZEM CD) 240 mg, oral, Daily    FLAXSEED MM Mouth/Throat    folic acid (FOLVITE) 1 mg, oral, Daily    hydroCHLOROthiazide (MICROZIDE) 12.5 mg, oral, Daily    levothyroxine (SYNTHROID, LEVOXYL) 100 mcg, oral, Daily    lisinopril 10 mg, oral, Daily    methotrexate (TREXALL) 25 mg, oral, Once Weekly, Follow directions carefully, and ask to explain any part you do not understand. Take exactly as directed.    metoprolol succinate XL (TOPROL-XL) 50 mg, oral, 2 times daily, Do not crush  "or chew.    nitroglycerin (NITROSTAT) 0.4 mg, sublingual, Every 5 min PRN    Oyster Shell Calcium-Vit D3 500 mg-5 mcg (200 unit) tablet 1 tablet, oral, Daily    predniSONE (Deltasone) 5 mg tablet Take one tablet every other day for 2 weeks then stop and keep the rest for a flare    wheat germ oil oil oral    whey prot/arg/glu/C/Zn//tos (WHEY PROT-ARG-GLUT-C-ZN-CU-TOS ORAL) oral     Last Recorded Vitals:  There were no vitals filed for this visit.    Physical Exam  Patient is alert and oriented x3.  HEENT is unremarkable mucous members are moist  Neck no JVP no bruits upstrokes are full no thyromegaly  Lungs are clear bilaterally.  No wheezing crackles or rales  Heart regular rhythm normal S1-S2 there is no S3 no murmurs are heard.  Abdomen is soft bs are positive nontender nondistended no organomegaly no pulsatile masses  Extremities have no edema.  Distal pulses present palpable.  Neuro is grossly nonfocal  Skin has no rashes     Last Labs:  CBC -  Lab Results   Component Value Date    WBC 7.1 09/25/2024    HGB 13.8 09/25/2024    HCT 42.5 09/25/2024    MCV 97 09/25/2024     09/25/2024     CMP -  Lab Results   Component Value Date    CALCIUM 9.5 09/25/2024    PHOS 3.9 09/23/2021    PROT 6.7 09/25/2024    ALBUMIN 4.2 09/25/2024    AST 20 09/25/2024    ALT 22 09/25/2024    ALKPHOS 146 (H) 09/25/2024    BILITOT 0.6 09/25/2024     LIPID PANEL -   Lab Results   Component Value Date    CHOL 122 04/05/2024    HDL 33.0 04/05/2024    CHHDL 3.7 04/05/2024    VLDL 36 04/05/2024    TRIG 181 (H) 04/05/2024    NHDL 89 04/05/2024     RENAL FUNCTION PANEL -   Lab Results   Component Value Date    K 4.4 09/25/2024    PHOS 3.9 09/23/2021       No results found for: \"BNP\", \"HGBA1C\"       Procedure  ONCO ECHO [09/07/2022]: Est EF 50%. SD = impaired relaxation pattern. Mod enlarged RV. Low normal RVSF.     CT [05/01/2023]: CHEST: No focal infiltrate, pulm nodule or lymphadenopathy identified. Atherosclerotic disease. " ABDOMEN-PELVIS: Serosal calcifications along right dome of the liver. Fatty infiltration throughout the right lobe of the liver. No definite enhancing mass or lymphadenopathy identified. Colonic diverticulosis w/o ev/of acute diverticulitis.      CT [05/16/2022]: Lobulated irreg mass / thickening in medial aspect of right lower hemithorax / pleura, decreased from previous study. Decreased left pleural fluid. Decreased size of subcarinal lymph node. Minimally more conspicuous lymph node anterior to ascending aorta.     PET for CA staging [02/22/2022]: Re-dem of previously reported right lung base diaphragm/pleural thickening activity. This activity has decreased in comparison to the prior study. Finding is sugg/of residual but improved neoplastic activity. Note is made of interval development of punctate chest wall foci which may represent new mets dz vs an inflamm process. Interval development of a more mass-like area of hypermetabolic tracer activity along the paraspinal right lung base with/SUV of 6.6. Finding suspicious for an active neoplastic/recurrence process. Re-dem of mild hypermetabolic mediastinal adenopathy. Again, the findings may represent an inflammatory process, malignant involvement cannot be excluded. Interval increase of hypermetabolic tracer activity corresponding to the focus along the posterior aspect of the prostate gland.      CT [07/02/2021]: Interval evolution of postsurgical changes from right-sided VATS when compared to most recent      EX NST [03/30/2021]: 5 min 1 sec (7 METs) . . . Abnormal = ev/for prior mid and basilar inferoposterior wall MI w/o residual dane-infarct ischemia. WMAs, EF 54%.     EX NST [05/11/2009]: 10 min 30 sec (12.50 METs) . . . Normal â€“ 55%     CATH [2001]: stents Ã--2 to the RCA. 70% LAD lesion treated medically. [facility].     Assessment/Plan   1. CAD. Inferiorwall MI 2001 status post stenting x2 right coronary artery. At that time 70% and  unrevascularized LAD.  stress test 2021 no ischemia in the LAD territory. Scar in the inferior wall. Continue with medical therapy. No symptoms of angina. Last tress test 2021 with evidence of scar in the inferior wall.  I will have his stress test repeated next year before he sees me.  He will hold his diltiazem in the morning.     2. Hyperlipidemia. Followed by Dr. Calle. 4/5/2024 LDL 53 HDL 33 triglycerides 181    3. Hypertension. Excellent today. Renal fxn ok on lisinopril     4. Tachycardia.  Heart rates are pretty well-controlled.  I will consolidate his metoprolol succinate which is 750 twice daily to 100 mg every afternoon.  He will continue diltiazem to 40 2 AM.     5. Lower extremity edema.  No edema currently.  Sometimes related to immunotherapy and weight gain.  A repeat echo be done next at the same time as a stress test    6. Mesothelioma. 99% resected. Now receiving immunotherapy.  Doing great.  Immunotherapy.  This point appears to be resubmission.    EKG today.  Take metoprolol succinate 100 mg every afternoon.  Exercise nuclear stress test holding morning dose of diltiazem next year and an echo next year.  Return to see me 1 year.  Ángel Ambrocio MD     Instructions and follow up

## 2024-10-15 ENCOUNTER — APPOINTMENT (OUTPATIENT)
Dept: CARDIOLOGY | Facility: CLINIC | Age: 79
End: 2024-10-15
Payer: MEDICARE

## 2024-10-15 VITALS
HEART RATE: 79 BPM | SYSTOLIC BLOOD PRESSURE: 122 MMHG | WEIGHT: 189 LBS | BODY MASS INDEX: 27.91 KG/M2 | OXYGEN SATURATION: 97 % | DIASTOLIC BLOOD PRESSURE: 74 MMHG

## 2024-10-15 DIAGNOSIS — I10 PRIMARY HYPERTENSION: Chronic | ICD-10-CM

## 2024-10-15 DIAGNOSIS — E78.00 HYPERCHOLESTEROLEMIA: Chronic | ICD-10-CM

## 2024-10-15 DIAGNOSIS — I25.5 ISCHEMIC CARDIOMYOPATHY: Chronic | ICD-10-CM

## 2024-10-15 DIAGNOSIS — R00.0 TACHYCARDIA: ICD-10-CM

## 2024-10-15 DIAGNOSIS — I25.10 CORONARY ARTERY DISEASE INVOLVING NATIVE CORONARY ARTERY OF NATIVE HEART WITHOUT ANGINA PECTORIS: Primary | Chronic | ICD-10-CM

## 2024-10-15 PROBLEM — M54.17 LUMBOSACRAL NEURITIS: Status: ACTIVE | Noted: 2024-10-15

## 2024-10-15 PROBLEM — I48.91 UNSPECIFIED ATRIAL FIBRILLATION (MULTI): Status: ACTIVE | Noted: 2024-10-15

## 2024-10-15 PROCEDURE — 1159F MED LIST DOCD IN RCRD: CPT | Performed by: INTERNAL MEDICINE

## 2024-10-15 PROCEDURE — 93005 ELECTROCARDIOGRAM TRACING: CPT | Performed by: INTERNAL MEDICINE

## 2024-10-15 PROCEDURE — 3078F DIAST BP <80 MM HG: CPT | Performed by: INTERNAL MEDICINE

## 2024-10-15 PROCEDURE — 1036F TOBACCO NON-USER: CPT | Performed by: INTERNAL MEDICINE

## 2024-10-15 PROCEDURE — 99214 OFFICE O/P EST MOD 30 MIN: CPT | Performed by: INTERNAL MEDICINE

## 2024-10-15 PROCEDURE — 93010 ELECTROCARDIOGRAM REPORT: CPT | Performed by: INTERNAL MEDICINE

## 2024-10-15 PROCEDURE — 1157F ADVNC CARE PLAN IN RCRD: CPT | Performed by: INTERNAL MEDICINE

## 2024-10-15 PROCEDURE — 3074F SYST BP LT 130 MM HG: CPT | Performed by: INTERNAL MEDICINE

## 2024-10-15 PROCEDURE — 1160F RVW MEDS BY RX/DR IN RCRD: CPT | Performed by: INTERNAL MEDICINE

## 2024-10-15 PROCEDURE — 1123F ACP DISCUSS/DSCN MKR DOCD: CPT | Performed by: INTERNAL MEDICINE

## 2024-10-15 RX ORDER — METOPROLOL SUCCINATE 100 MG/1
100 TABLET, EXTENDED RELEASE ORAL 2 TIMES DAILY
Qty: 180 TABLET | Refills: 3 | Status: SHIPPED | OUTPATIENT
Start: 2024-10-15 | End: 2025-10-15

## 2024-10-15 NOTE — PATIENT INSTRUCTIONS
1. CAD. Inferiorwall MI 2001 status post stenting x2 right coronary artery. At that time 70% and unrevascularized LAD.  stress test 2021 no ischemia in the LAD territory. Scar in the inferior wall. Continue with medical therapy. No symptoms of angina. Last tress test 2021 with evidence of scar in the inferior wall.  I will have his stress test repeated next year before he sees me.  He will hold his diltiazem in the morning.     2. Hyperlipidemia. Followed by Dr. Calle. 4/5/2024 LDL 53 HDL 33 triglycerides 181    3. Hypertension. Excellent today. Renal fxn ok on lisinopril     4. Tachycardia.  Heart rates are pretty well-controlled.  I will consolidate his metoprolol succinate which is 750 twice daily to 100 mg every afternoon.  He will continue diltiazem to 40 2 AM.     5. Lower extremity edema.  No edema currently.  Sometimes related to immunotherapy and weight gain.  A repeat echo be done next at the same time as a stress test    6. Mesothelioma. 99% resected. Now receiving immunotherapy.  Doing great.  Immunotherapy.  This point appears to be resubmission.    EKG today.  Take metoprolol succinate 100 mg every afternoon.  Exercise nuclear stress test holding morning dose of diltiazem next year and an echo next year.  Return to see me 1 year.

## 2024-10-16 ENCOUNTER — OFFICE VISIT (OUTPATIENT)
Dept: HEMATOLOGY/ONCOLOGY | Facility: HOSPITAL | Age: 79
End: 2024-10-16
Payer: MEDICARE

## 2024-10-16 ENCOUNTER — TELEPHONE (OUTPATIENT)
Dept: RHEUMATOLOGY | Facility: CLINIC | Age: 79
End: 2024-10-16

## 2024-10-16 VITALS
DIASTOLIC BLOOD PRESSURE: 64 MMHG | BODY MASS INDEX: 27.91 KG/M2 | WEIGHT: 189 LBS | RESPIRATION RATE: 17 BRPM | SYSTOLIC BLOOD PRESSURE: 124 MMHG | TEMPERATURE: 98.4 F | OXYGEN SATURATION: 100 % | HEART RATE: 84 BPM

## 2024-10-16 DIAGNOSIS — C45.9 MALIGNANT NEOPLASM OF MESOTHELIAL TISSUE (MULTI): ICD-10-CM

## 2024-10-16 PROCEDURE — 1157F ADVNC CARE PLAN IN RCRD: CPT | Performed by: INTERNAL MEDICINE

## 2024-10-16 PROCEDURE — 1123F ACP DISCUSS/DSCN MKR DOCD: CPT | Performed by: INTERNAL MEDICINE

## 2024-10-16 PROCEDURE — 1125F AMNT PAIN NOTED PAIN PRSNT: CPT | Performed by: INTERNAL MEDICINE

## 2024-10-16 PROCEDURE — 3078F DIAST BP <80 MM HG: CPT | Performed by: INTERNAL MEDICINE

## 2024-10-16 PROCEDURE — 3074F SYST BP LT 130 MM HG: CPT | Performed by: INTERNAL MEDICINE

## 2024-10-16 PROCEDURE — 99214 OFFICE O/P EST MOD 30 MIN: CPT | Performed by: INTERNAL MEDICINE

## 2024-10-16 PROCEDURE — 1159F MED LIST DOCD IN RCRD: CPT | Performed by: INTERNAL MEDICINE

## 2024-10-16 ASSESSMENT — ENCOUNTER SYMPTOMS
SORE THROAT: 0
DIAPHORESIS: 0
ANOREXIA: 0
COUGH: 0
ABDOMINAL PAIN: 0
VOMITING: 0
CHILLS: 0
FEVER: 0
NUMBNESS: 0
JOINT SWELLING: 0
VISUAL CHANGE: 1
ARTHRALGIAS: 1
SWOLLEN GLANDS: 0
HEADACHES: 0
VERTIGO: 0
MYALGIAS: 0
FATIGUE: 0
WEAKNESS: 0
NECK PAIN: 1
NAUSEA: 0
CHANGE IN BOWEL HABIT: 0

## 2024-10-16 ASSESSMENT — PAIN SCALES - GENERAL: PAINLEVEL_OUTOF10: 6

## 2024-10-16 NOTE — TELEPHONE ENCOUNTER
Patient called stating he would like the hip injection done on a Monday at [facility]. Patient needs scheduled as soon as possible. Patient can be reached at [phone].    Thanks,  Arabella CRAWFORD

## 2024-10-16 NOTE — PROGRESS NOTES
Patient ID: Ehsan Dias is a 79 y.o. male.    DIAGNOSIS     Malignant right pleural mesothelioma- epithelioid type.  Date of diagnosis: 4/2/2021 from VATS guided pleural mass biopsy the neoplasm shows solid and papillary growth patterns. IHC of  tumor  cells were POSITIVE for calretinin, WT-1, CK5/6 and D2-40 (weak, focal), and NEGATIVE for CEAm, TTF-1, B72.3 and MOC31,        STAGING     Initial T2N0M0  Progression in chest and peritoneum in January 2022        CURRENT SITES OF DISEASE     pleura, mediastinum, peritoneum        MOLECULAR GENOMICS     MUTYH 48.9%  BAP1 17.5%   Tumor Mutational Rake (TMB): 5.8 m/MB Microsatellite Instability (MSI) Status: Stable           SERUM TUMOR MARKER     Not applicable        PRIOR THERAPY     1- Pleurectomy May 7th , 2021   2- 02.23.2022 : Started on Ipilimumab and Nivolumab. Documented clinical and radiographic response.  Ipilimumab dropped and nivolumab alone was continued starting December 21, 2022 given toxicities of the combination.  Last treated February 2024, completed 2 years of treatment        CURRENT THERAPY     Observation        CURRENT ONCOLOGICAL PROBLEMS     1/ R sided pleuritic chest pain , Improving  2. weight loss- in history it seems like this is intentional (need additional data points in future follow ups) - Gaining weight post surgery  3. dry cough - Significantly improved  4- Polypoid lesion seen on CT scan of the chest within the distal esophagus, referred for endoscopy.,  September 2021  5-  ascites starting Jan 2022.  Paracentesis performed in February suggest malignant ascites from mesothelioma.  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  Seen by rheumatology August 2022, prescribed Celebrex. 1/18/23 - Prednisone taper 10 mg to current 7.5 mg.  Down to prednisone 5 mg with initiation of methotrexate  in July 2023.  Patient notified me on August 23, 2023 that he is not taking his methotrexate and he is fearful of the side effects.  Continues to have polyarthritis symptoms.  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  Due to see ENT.  14- Increasing left anterior sacroiliac left inguinal left hip and pelvic pain August and September 2024.  Ordered MRI of the pelvis.  Evidence of significant left hip arthritis.  Consider orthopedic referral.        HISTORY OF PRESENT ILLNESS      His initial presentation was in July of 2018 when he presented with cc of  persistent fever, right sided pleuritic chest pain for a month. He was seen by Dr. Catie Harvey in Pulmonary clinic, routine imaging showed right side pleural effusion.  Diagnostic thoracentesis was performed on 7/13 that showed exudative effusion with neutrophilic predominance, cultures negative, cytology negative for carcinoma.   He completed course oral antibiotics and his pain and effusion resolved.  Again, had recurrence of pleuritic chest pain  and pleural effusion   later in 12/2020. The effusion was found to be worse on repeat chest imaging ( CT Chest in 2/2021). Diagnostic and therapeutic thoracentesis was done which showed exudative effusion with lymphocyte predominance. Given his recurrent ipsilateral moderate  size pleural effusion and near complete collapse of the right lower lobe and mild right middle lobe atelectasis on  CT chest- he was referred to CT surgeon and a PET CT was also obtained.      3/2021: PET CT revealed right-sided pleural effusion with multiple hypermetabolic  areas of pleural nodularity visualized along the pleural surface of the right lung, which predominantly involve the right lung base. In addition, there were multiple FDG avid mediastinal lymph nodes including enlarged subcarinal lymph nodes (maximum SUV  of 4.4), paratracheal lymph nodes (maximum SUV of 3.1), and right hilar  lymph nodes (maximum SUV of 2.9).     3/23/21: was seen by Dr. Dannielle Junior from CT surgery and was scheduled to get R VATS pleural biopsy on 4/2/21: thorascopic exploration showed some adhesions at the diaphragm to the chest wall and also  to the lung.        PAST MEDICAL HISTORY     CAD with MI s/p PCI to RCA with 2 stents,   OA R shoulder s/p replacement,   HTN   elevated PSA with FDG avidity in post lobe of prostate- followed by Dr. 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   former smoker, quit 1969, 2 packs x 8 year  = 16 pack years   smoked marijuana since 1970s; quit few months ago  Possible exposure to asbestos when worked in the [employer].          CURRENT MEDS     see med list         ALLERGIES     nkda         FAMILY HISTORY     no family h/o mesothelioma       Subjective    Cancer  Associated symptoms include arthralgias, neck pain and a visual change. Pertinent negatives include no abdominal pain, anorexia, change in bowel habit, chest pain, chills, congestion, coughing, diaphoresis, fatigue, fever, headaches, joint swelling, myalgias, nausea, numbness, rash, sore throat, swollen glands, urinary symptoms, vertigo, vomiting or weakness.       Since last seeing the patient on September 18 I had him go for an MRI of the pelvis that was done on October 3, 2024.  This is for left hip pain.  Severe left hip arthritis  was seen.  No evidence of cancer recurrence.  He has been seen by cardiology and hematology as well.  Denies any fever, breathing is the same, he remains active but has problems with his left hip, eating well,    Objective    BSA: 2.04 meters squared  /64 (BP Location: Left arm, Patient Position: Sitting)   Pulse 84   Temp 36.9 °C (98.4 °F) (Temporal)   Resp 17   Wt 85.7 kg (189 lb)   SpO2 100%   BMI 27.91 kg/m²      Physical Exam  Constitutional:       General: He is not in acute distress.     Appearance: Normal appearance. He is not ill-appearing, toxic-appearing or diaphoretic.   HENT:      Nose: No congestion or rhinorrhea.      Mouth/Throat:      Pharynx: No oropharyngeal exudate or posterior oropharyngeal erythema.   Eyes:      General: No scleral icterus.     Conjunctiva/sclera: Conjunctivae normal.   Cardiovascular:      Rate and Rhythm: Normal rate and regular rhythm.      Pulses: Normal pulses.      Heart sounds: Normal heart sounds. No murmur heard.     No friction rub. No gallop.   Pulmonary:      Effort: Pulmonary effort is normal. No respiratory distress.      Breath sounds: Normal breath sounds. No stridor. No wheezing or rales.   Chest:      Chest wall: No tenderness.   Abdominal:      General: Abdomen is flat. Bowel sounds are normal. There is no distension.      Palpations: Abdomen is soft. There is no mass.      Tenderness: There is no abdominal tenderness. There is no guarding or rebound.   Musculoskeletal:      Cervical back: No tenderness.      Right lower leg: No edema.      Left lower leg: No edema.   Lymphadenopathy:      Cervical: No cervical adenopathy.   Skin:     General: Skin is warm.   Neurological:      General: No focal deficit present.      Mental Status: He is oriented to person, place, and time.   Psychiatric:         Mood and Affect: Mood normal.         Behavior: Behavior normal.         Performance Status:  Symptomatic; fully ambulatory    MRI of the pelvis with and  without IV contrast; 10/3/2024   IMPRESSION:  1. Findings consistent with advanced osteoarthrosis of the left hip  joint, as detailed above, without definite evidence of osseous  metastatic disease. Additionally, there is advanced, however slightly  less severe, degenerative changes of the right hip joint.  2. Small volume left hip joint effusion with multiple intra-articular  bodies.      Assessment/Plan     This is an 79-year-old gentleman with epithelioid mesothelioma diagnosed nearly 3-1/2 years ago who had a complete response to ipilimumab and nivolumab that was administered from February 2022 through February 2024 at which time he completed his 2 years of treatment.  He has no evidence of disease recurrence.  We had ordered an MRI of the hip on him which shows significant osteoarthritis in his left hip.  We recommended for him to see an orthopedic doctor.  From an oncologic standpoint we will see him back in January with repeat imaging studies.      Cancer Staging   No matching staging information was found for the patient.      Oncology History   Malignant neoplasm of mesothelial tissue (Multi)   2/27/2023 Initial Diagnosis    Malignant neoplasm of mesothelial tissue (CMS/HCC)     10/4/2023 - 2/7/2024 Chemotherapy    Nivolumab 240 mg (Biweekly), 28 Day Cycles                   Shawn Blum MD

## 2024-10-19 NOTE — PROGRESS NOTES
Subjective   Patient ID: [id number]   Ehsan Dias is a 79 y.o. male who presents for Rheumatoid Arthritis (Follow up visit for RA.).  HPI  Follow up for ICI induced arthritis     Current IS   Methotrexate 25 mg weekly increased last visit  Now decreased to 20 mg weekly by himself about 9/2024  Folic acid daily    RECALL  This is a 77-year-old male who presents to [facility]. Past medical history includes that of hypertension, hyperlipidemia, hypothyroidism, coronary artery disease and most importantly, mesothelioma. For mesothelioma, he follows with oncologist. He was diagnosed on 4/2021, about 1 year ago, he was started on combination of ipilimumab and nivolumab. He developed significant joint pain/inflammatory arthritis 6 months later. Thereafter, ipilimumab was discontinued. He was started on Plaquenil. At the same time, he developed a skin rash and it was not certain whether this was related to Plaquenil or ipilimumab. Irrespective, the Plaquenil was discontinued. He was treated with prednisone and reports that the joint symptoms are well with prednisone 10 mg a day.   At his recent follow-up with his rheumatologist, Dr. Segura, it was advised that he consider reducing the dose of prednisone.  Today, patient mentions that he has been taking prednisone 7.5 mg a day and continues to have some discomfort on his MCPs as well as wrist joints. He reports protracted morning stiffness. He does not report much discomfort in other joints including feet, knees, ankles, shoulders or elbows. He does not report much discomfort on neck or back. He does have a history of right shoulder replacement. Patient questions whether it is reasonable to take prednisone 10 mg a day on a regular basis.    ICI history for pleural mesothelioma  Started on Ipilimumab and Nivolumab. Documented clinical and radiographic response.  Ipilimumab dropped and nivolumab alone was continued starting December 21, 2022 given toxicities of the  combination.  Last treated February 2024, completed 2 years of treatment      Interval Symptoms  Patient feels that left hip continues to get worse. Difficult to walk up steps. Was walking with a limp. Occasionally takes Tylenol to help with pain now here for ultrasound guided injection of the left hip (2 pills 2x a day). Pain on right side of abdomen that started 3 days ago, unsure if he pulled a muscle.      Review of Systems  Constitutional: Denies fever, chills   Eyes: Denies dry eyes, blurry vision, redness of the eyes, pain in the eyes   ENT: Denies dry mouth, loss of taste, sores in the mouth, or difficulty swallowing   Cardiovascular: Denies chest pain, palpitations   Respiratory: Denies shortness of breath   Gastrointestinal: Denies changes in bowl or bladder function, nausea, vomiting, heartburn   Integumentary: Denies photosensitivity, rash or lesions, Raynaud's   Neurological: Denies any numbness or tingling   Hematologic/Lymphatic: Denies bleeding, alopecia, Raynaud's phenomena     Objective   Physical Exam  General: Cooperative, in NAD   Eyes: Conjunctiva clear, sclera white, anicteric   Nose: No external deformity, no mucosal crusting or signs of bleeding   Skin: No rashes, ulcers, tophi, or nodules noted  MSK:   Spine: Normal posture, no point tenderness to palpation, normal ROM  Upper Extremities:   Hands: no swelling, warmth, tenderness  Wrists:  no swelling, warmth, tenderness    Elbows: No erythema, warmth, synovitis, or pain; normal ROM   Shoulders: decreased passive ROM of left shoulder  Lower Extremities:   Hips: log roll painful on left, limited range of motion   Knees: No erythema, warmth, swelling, or pain; normal ROM   Ankles: No erythema, warmth, synovitis, or pain; normal ROM    Lab Results   Component Value Date    WBC 7.1 09/25/2024    HGB 13.8 09/25/2024    HCT 42.5 09/25/2024    MCV 97 09/25/2024     09/25/2024      Lab Results   Component Value Date    GLUCOSE 93 09/25/2024     CALCIUM 9.5 09/25/2024     09/25/2024    K 4.4 09/25/2024    CO2 30 09/25/2024     09/25/2024    BUN 20 09/25/2024    CREATININE 0.86 09/25/2024      Assessment/Plan   Diagnoses and all orders for this visit:  Osteoarthritis, left hip and left shoulder  XR of his left hip and pelvis on 9/25/2024 revealed moderate right and advanced left hip osteoarthrosis  MR pelvis 10/3/2024 with findings consistent with advanced osteoarthrosis of the left hip  joint without definite evidence of osseous metastatic disease. Additionally, there is advanced, however slightly less severe, degenerative changes of the right hip joint. Small volume left hip joint effusion with multiple intra-articular bodies  Ultrasound guided injection of left hip today  Can schedule another appointment for left shoulder injection    Polyarthritis, inflammatory (CMS/HCC)  ICI induced arthritis ( seronegative RA)  No changes today    Osteopenia with High Frax  Previously wanted to wait before starting anti resoprtives  He is actively working out and taking Vit D and Ca consistently  Now off prednisone for many months    Hx of CTS - predominantly left hand  Currently , asymptomatic  No weakness on exam today    Large Joint Injection/Arthrocentesis: L hip joint on 10/21/2024 11:25 AM  Indications: pain  Details: (23G 3.5in spinal needle) needle, ultrasound-guided anterolateral approach  Medications: 40 mg triamcinolone acetonide 40 mg/mL; 1 mL lidocaine 10 mg/mL (1 %)  Outcome: tolerated well, no immediate complications    Ultrasound guided injection of the left hip using anterolateral approach under aseptic condition. Lidocaine was used as a local anesthetic followed by intraarticular injection with 40mg of Kenalog + 1cc of lidocaine. Patient tolerated the procedure well with no immediate complications  Procedure, treatment alternatives, risks and benefits explained, specific risks discussed. Patient was prepped and draped in the usual  sterile fashion.          Follow up scheduled with Dr. Barton 1/24/2025. Patient will call to schedule appointment with Dr. Barton for left shoulder injection  Patient seen and discussed with Dr. Oralia Escalante DO    Rheumatology Fellow PGY-4

## 2024-10-21 ENCOUNTER — APPOINTMENT (OUTPATIENT)
Dept: RHEUMATOLOGY | Facility: CLINIC | Age: 79
End: 2024-10-21
Payer: MEDICARE

## 2024-10-21 ENCOUNTER — HOSPITAL ENCOUNTER (OUTPATIENT)
Dept: RADIOLOGY | Facility: EXTERNAL LOCATION | Age: 79
Discharge: HOME | End: 2024-10-21

## 2024-10-21 VITALS
TEMPERATURE: 98 F | WEIGHT: 188 LBS | BODY MASS INDEX: 27.85 KG/M2 | SYSTOLIC BLOOD PRESSURE: 148 MMHG | HEART RATE: 86 BPM | DIASTOLIC BLOOD PRESSURE: 83 MMHG | HEIGHT: 69 IN

## 2024-10-21 DIAGNOSIS — M19.012 OSTEOARTHRITIS OF LEFT SHOULDER, UNSPECIFIED OSTEOARTHRITIS TYPE: ICD-10-CM

## 2024-10-21 DIAGNOSIS — M16.12 OSTEOARTHRITIS OF LEFT HIP, UNSPECIFIED OSTEOARTHRITIS TYPE: Primary | ICD-10-CM

## 2024-10-21 PROCEDURE — 99214 OFFICE O/P EST MOD 30 MIN: CPT

## 2024-10-21 PROCEDURE — 20610 DRAIN/INJ JOINT/BURSA W/O US: CPT

## 2024-10-21 PROCEDURE — 1125F AMNT PAIN NOTED PAIN PRSNT: CPT

## 2024-10-21 PROCEDURE — 1159F MED LIST DOCD IN RCRD: CPT

## 2024-10-21 PROCEDURE — 3077F SYST BP >= 140 MM HG: CPT

## 2024-10-21 PROCEDURE — 3079F DIAST BP 80-89 MM HG: CPT

## 2024-10-21 PROCEDURE — 20611 DRAIN/INJ JOINT/BURSA W/US: CPT

## 2024-10-21 PROCEDURE — 1157F ADVNC CARE PLAN IN RCRD: CPT

## 2024-10-21 PROCEDURE — 1123F ACP DISCUSS/DSCN MKR DOCD: CPT

## 2024-10-21 RX ORDER — TRIAMCINOLONE ACETONIDE 40 MG/ML
40 INJECTION, SUSPENSION INTRA-ARTICULAR; INTRAMUSCULAR
Status: COMPLETED | OUTPATIENT
Start: 2024-10-21 | End: 2024-10-21

## 2024-10-21 RX ORDER — LIDOCAINE HYDROCHLORIDE 10 MG/ML
1 INJECTION, SOLUTION INFILTRATION; PERINEURAL
Status: COMPLETED | OUTPATIENT
Start: 2024-10-21 | End: 2024-10-21

## 2024-10-21 ASSESSMENT — PAIN SCALES - GENERAL: PAINLEVEL_OUTOF10: 6

## 2024-11-13 ENCOUNTER — APPOINTMENT (OUTPATIENT)
Dept: PRIMARY CARE | Facility: CLINIC | Age: 79
End: 2024-11-13
Payer: MEDICARE

## 2024-11-13 VITALS
WEIGHT: 184 LBS | RESPIRATION RATE: 12 BRPM | DIASTOLIC BLOOD PRESSURE: 78 MMHG | SYSTOLIC BLOOD PRESSURE: 126 MMHG | HEIGHT: 69 IN | BODY MASS INDEX: 27.25 KG/M2 | HEART RATE: 84 BPM | OXYGEN SATURATION: 97 %

## 2024-11-13 DIAGNOSIS — R97.20 ELEVATED PSA: ICD-10-CM

## 2024-11-13 DIAGNOSIS — N52.9 MALE ERECTILE DISORDER: ICD-10-CM

## 2024-11-13 DIAGNOSIS — E78.00 HYPERCHOLESTEROLEMIA: Chronic | ICD-10-CM

## 2024-11-13 DIAGNOSIS — C45.9 MALIGNANT NEOPLASM OF MESOTHELIAL TISSUE (MULTI): Chronic | ICD-10-CM

## 2024-11-13 DIAGNOSIS — C78.6 SECONDARY MALIGNANT NEOPLASM OF RETROPERITONEUM AND PERITONEUM (MULTI): Chronic | ICD-10-CM

## 2024-11-13 DIAGNOSIS — E03.9 HYPOTHYROIDISM, UNSPECIFIED TYPE: ICD-10-CM

## 2024-11-13 DIAGNOSIS — I25.10 CORONARY ARTERY DISEASE INVOLVING NATIVE CORONARY ARTERY OF NATIVE HEART WITHOUT ANGINA PECTORIS: Primary | Chronic | ICD-10-CM

## 2024-11-13 DIAGNOSIS — I70.0 ATHEROSCLEROSIS OF AORTA (CMS-HCC): Chronic | ICD-10-CM

## 2024-11-13 DIAGNOSIS — M81.0 OSTEOPOROSIS, UNSPECIFIED OSTEOPOROSIS TYPE, UNSPECIFIED PATHOLOGICAL FRACTURE PRESENCE: ICD-10-CM

## 2024-11-13 DIAGNOSIS — M06.9 RHEUMATOID ARTHRITIS IN REMISSION (MULTI): ICD-10-CM

## 2024-11-13 DIAGNOSIS — N40.1 BENIGN PROSTATIC HYPERPLASIA WITH NOCTURIA: ICD-10-CM

## 2024-11-13 DIAGNOSIS — R35.1 BENIGN PROSTATIC HYPERPLASIA WITH NOCTURIA: ICD-10-CM

## 2024-11-13 DIAGNOSIS — I10 PRIMARY HYPERTENSION: Chronic | ICD-10-CM

## 2024-11-13 DIAGNOSIS — I25.5 ISCHEMIC CARDIOMYOPATHY: Chronic | ICD-10-CM

## 2024-11-13 DIAGNOSIS — M06.4 POLYARTHRITIS, INFLAMMATORY (MULTI): ICD-10-CM

## 2024-11-13 PROBLEM — I48.91 UNSPECIFIED ATRIAL FIBRILLATION (MULTI): Status: RESOLVED | Noted: 2024-10-15 | Resolved: 2024-11-13

## 2024-11-13 PROCEDURE — 99214 OFFICE O/P EST MOD 30 MIN: CPT | Performed by: FAMILY MEDICINE

## 2024-11-13 PROCEDURE — 3078F DIAST BP <80 MM HG: CPT | Performed by: FAMILY MEDICINE

## 2024-11-13 PROCEDURE — 3074F SYST BP LT 130 MM HG: CPT | Performed by: FAMILY MEDICINE

## 2024-11-13 PROCEDURE — 1123F ACP DISCUSS/DSCN MKR DOCD: CPT | Performed by: FAMILY MEDICINE

## 2024-11-13 PROCEDURE — 1159F MED LIST DOCD IN RCRD: CPT | Performed by: FAMILY MEDICINE

## 2024-11-13 PROCEDURE — G2211 COMPLEX E/M VISIT ADD ON: HCPCS | Performed by: FAMILY MEDICINE

## 2024-11-13 PROCEDURE — 1157F ADVNC CARE PLAN IN RCRD: CPT | Performed by: FAMILY MEDICINE

## 2024-11-13 PROCEDURE — 1036F TOBACCO NON-USER: CPT | Performed by: FAMILY MEDICINE

## 2024-11-13 RX ORDER — DILTIAZEM HYDROCHLORIDE 240 MG/1
240 CAPSULE, COATED, EXTENDED RELEASE ORAL DAILY
Qty: 100 CAPSULE | Refills: 1 | Status: SHIPPED | OUTPATIENT
Start: 2024-11-13

## 2024-11-13 RX ORDER — LISINOPRIL 10 MG/1
10 TABLET ORAL DAILY
Qty: 100 TABLET | Refills: 1 | Status: SHIPPED | OUTPATIENT
Start: 2024-11-13

## 2024-11-13 RX ORDER — HEPARIN 100 UNIT/ML
500 SYRINGE INTRAVENOUS AS NEEDED
OUTPATIENT
Start: 2024-11-13

## 2024-11-13 RX ORDER — HEPARIN SODIUM,PORCINE/PF 10 UNIT/ML
50 SYRINGE (ML) INTRAVENOUS AS NEEDED
OUTPATIENT
Start: 2024-11-13

## 2024-11-13 RX ORDER — LEVOTHYROXINE SODIUM 100 UG/1
100 TABLET ORAL DAILY
Qty: 100 TABLET | Refills: 1 | Status: SHIPPED | OUTPATIENT
Start: 2024-11-13

## 2024-11-13 RX ORDER — ATORVASTATIN CALCIUM 80 MG/1
80 TABLET, FILM COATED ORAL DAILY
Qty: 100 TABLET | Refills: 1 | Status: SHIPPED | OUTPATIENT
Start: 2024-11-13

## 2024-11-13 RX ORDER — HYDROCHLOROTHIAZIDE 12.5 MG/1
12.5 TABLET ORAL DAILY
Qty: 100 TABLET | Refills: 1 | Status: SHIPPED | OUTPATIENT
Start: 2024-11-13

## 2024-11-13 ASSESSMENT — ENCOUNTER SYMPTOMS
CONSTIPATION: 0
DYSPHORIC MOOD: 0
ABDOMINAL DISTENTION: 0
CHEST TIGHTNESS: 0
ABDOMINAL PAIN: 0
ARTHRALGIAS: 0
EYE REDNESS: 0
BLOOD IN STOOL: 0
NERVOUS/ANXIOUS: 0
COUGH: 0
WEAKNESS: 0
BRUISES/BLEEDS EASILY: 0
FATIGUE: 0
ADENOPATHY: 0
HEADACHES: 0
DYSURIA: 0
BACK PAIN: 0
FEVER: 0
EYE PAIN: 0
CHILLS: 0
DIZZINESS: 0
DIFFICULTY URINATING: 0
SORE THROAT: 0
DIARRHEA: 0
APPETITE CHANGE: 0
SHORTNESS OF BREATH: 0

## 2024-11-13 NOTE — PROGRESS NOTES
Subjective   Patient ID: Ehsan Dias is a 79 y.o. male who presents for Follow-up.  Mesothelioma/ retroperitoneal neoplasm is stable, monitoring with Onocology. Monitoring with CT    For RA he needed steroid injx in L hip last month, which helped. Otherwise doing well methotrexate and has not needed oral steroids recently.    Pt has chronic CMP, CAD, PVD, and HTN. Sees cardiology.  Pt is taking Diltiazem, hydrochlorothiazide and Metoprolol. Tolerating well.  Exercising 3 days per week   Low sodium diet is usually being followed.   Is not monitoring home blood pressures.   Denies HA, vision changes or CP.     Pt has Dyslipidemia.   Lipid panel showed LDL 53.  Currently taking Atorvastatin and is tolerating well without muscle pains or weakness.     For elevated PSA, overdue for follow up with Urology.    Pt has chronic, stable hypothyroidism.   Taking Synthroid  TSH is due to recheck.  Lipid panel is well controlled.  Energy is fair. Pt denies significant weight gain, low mood, constipation, or skin changes.   Medication is tolerated well without anxiety, tremors, palpitations, involuntary weight loss, heat intolerance or diarrhea.    He brings up low sexual desire and would like to see about starting Testosterone. T was 310, free T 49. He is overdue for follow up with Dr Carrington for elevated PSA.            Review of Systems   Constitutional:  Negative for appetite change, chills, fatigue and fever.   HENT:  Negative for congestion, hearing loss and sore throat.    Eyes:  Negative for pain, redness and visual disturbance.   Respiratory:  Negative for cough, chest tightness and shortness of breath.    Cardiovascular:  Negative for chest pain and leg swelling.   Gastrointestinal:  Negative for abdominal distention, abdominal pain, blood in stool, constipation and diarrhea.   Genitourinary:  Negative for difficulty urinating and dysuria.   Musculoskeletal:  Negative for arthralgias and back pain.   Skin:   "Negative for rash.   Neurological:  Negative for dizziness, weakness and headaches.   Hematological:  Negative for adenopathy. Does not bruise/bleed easily.   Psychiatric/Behavioral:  Negative for dysphoric mood. The patient is not nervous/anxious.        Objective   /78   Pulse 84   Resp 12   Ht 1.753 m (5' 9\")   Wt 83.5 kg (184 lb)   SpO2 97%   BMI 27.17 kg/m²    Physical Exam  Constitutional:       General: He is not in acute distress.     Appearance: Normal appearance.   Cardiovascular:      Rate and Rhythm: Normal rate and regular rhythm.      Heart sounds: Normal heart sounds. No murmur heard.  Pulmonary:      Effort: Pulmonary effort is normal.      Breath sounds: Normal breath sounds.   Abdominal:      Palpations: Abdomen is soft.      Tenderness: There is no abdominal tenderness.   Neurological:      Mental Status: He is alert.   Psychiatric:         Mood and Affect: Mood normal.         Judgment: Judgment normal.           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

## 2024-11-13 NOTE — PROGRESS NOTES
"Subjective   Patient ID: Ehsan Dias is a 79 y.o. male who presents for Follow-up.    HPI     Review of Systems    Objective   /78   Pulse 84   Resp 12   Ht 1.753 m (5' 9\")   Wt 83.5 kg (184 lb)   SpO2 97%   BMI 27.17 kg/m²     Physical Exam    Assessment/Plan          "

## 2024-12-15 DIAGNOSIS — I10 PRIMARY HYPERTENSION: Chronic | ICD-10-CM

## 2025-01-02 RX ORDER — METOPROLOL SUCCINATE 50 MG/1
50 TABLET, EXTENDED RELEASE ORAL 2 TIMES DAILY
Qty: 180 TABLET | Refills: 1 | OUTPATIENT
Start: 2025-01-02

## 2025-01-02 RX ORDER — DILTIAZEM HYDROCHLORIDE 240 MG/1
240 CAPSULE, COATED, EXTENDED RELEASE ORAL DAILY
Qty: 90 CAPSULE | Refills: 1 | OUTPATIENT
Start: 2025-01-02

## 2025-01-03 ENCOUNTER — LAB (OUTPATIENT)
Dept: LAB | Facility: LAB | Age: 80
End: 2025-01-03
Payer: MEDICARE

## 2025-01-03 ENCOUNTER — HOSPITAL ENCOUNTER (OUTPATIENT)
Dept: RADIOLOGY | Facility: CLINIC | Age: 80
End: 2025-01-03
Payer: MEDICARE

## 2025-01-03 DIAGNOSIS — R97.20 ELEVATED PSA: ICD-10-CM

## 2025-01-03 DIAGNOSIS — E78.00 HYPERCHOLESTEROLEMIA: Chronic | ICD-10-CM

## 2025-01-03 DIAGNOSIS — C45.9 MALIGNANT NEOPLASM OF MESOTHELIAL TISSUE (MULTI): ICD-10-CM

## 2025-01-03 LAB
ALBUMIN SERPL BCP-MCNC: 4.4 G/DL (ref 3.4–5)
ALP SERPL-CCNC: 126 U/L (ref 33–136)
ALT SERPL W P-5'-P-CCNC: 18 U/L (ref 10–52)
ANION GAP SERPL CALC-SCNC: 15 MMOL/L (ref 10–20)
AST SERPL W P-5'-P-CCNC: 10 U/L (ref 9–39)
BASOPHILS # BLD AUTO: 0.06 X10*3/UL (ref 0–0.1)
BASOPHILS NFR BLD AUTO: 0.9 %
BILIRUB SERPL-MCNC: 0.6 MG/DL (ref 0–1.2)
BUN SERPL-MCNC: 18 MG/DL (ref 6–23)
CALCIUM SERPL-MCNC: 9.9 MG/DL (ref 8.6–10.6)
CHLORIDE SERPL-SCNC: 102 MMOL/L (ref 98–107)
CHOLEST SERPL-MCNC: 116 MG/DL (ref 0–199)
CHOLESTEROL/HDL RATIO: 4
CO2 SERPL-SCNC: 30 MMOL/L (ref 21–32)
CREAT SERPL-MCNC: 0.74 MG/DL (ref 0.5–1.3)
EGFRCR SERPLBLD CKD-EPI 2021: >90 ML/MIN/1.73M*2
EOSINOPHIL # BLD AUTO: 0.14 X10*3/UL (ref 0–0.4)
EOSINOPHIL NFR BLD AUTO: 2.2 %
ERYTHROCYTE [DISTWIDTH] IN BLOOD BY AUTOMATED COUNT: 13.7 % (ref 11.5–14.5)
GLUCOSE SERPL-MCNC: 86 MG/DL (ref 74–99)
HCT VFR BLD AUTO: 46.5 % (ref 41–52)
HDLC SERPL-MCNC: 28.8 MG/DL
HGB BLD-MCNC: 14.9 G/DL (ref 13.5–17.5)
IMM GRANULOCYTES # BLD AUTO: 0.02 X10*3/UL (ref 0–0.5)
IMM GRANULOCYTES NFR BLD AUTO: 0.3 % (ref 0–0.9)
LDLC SERPL CALC-MCNC: 53 MG/DL
LYMPHOCYTES # BLD AUTO: 1.68 X10*3/UL (ref 0.8–3)
LYMPHOCYTES NFR BLD AUTO: 26.1 %
MCH RBC QN AUTO: 31.2 PG (ref 26–34)
MCHC RBC AUTO-ENTMCNC: 32 G/DL (ref 32–36)
MCV RBC AUTO: 98 FL (ref 80–100)
MONOCYTES # BLD AUTO: 0.74 X10*3/UL (ref 0.05–0.8)
MONOCYTES NFR BLD AUTO: 11.5 %
NEUTROPHILS # BLD AUTO: 3.8 X10*3/UL (ref 1.6–5.5)
NEUTROPHILS NFR BLD AUTO: 59 %
NON HDL CHOLESTEROL: 87 MG/DL (ref 0–149)
NRBC BLD-RTO: 0 /100 WBCS (ref 0–0)
PLATELET # BLD AUTO: 316 X10*3/UL (ref 150–450)
POTASSIUM SERPL-SCNC: 4 MMOL/L (ref 3.5–5.3)
PROT SERPL-MCNC: 7 G/DL (ref 6.4–8.2)
PSA SERPL-MCNC: 6.57 NG/ML
RBC # BLD AUTO: 4.77 X10*6/UL (ref 4.5–5.9)
SODIUM SERPL-SCNC: 143 MMOL/L (ref 136–145)
TRIGL SERPL-MCNC: 170 MG/DL (ref 0–149)
VLDL: 34 MG/DL (ref 0–40)
WBC # BLD AUTO: 6.4 X10*3/UL (ref 4.4–11.3)

## 2025-01-03 PROCEDURE — 80053 COMPREHEN METABOLIC PANEL: CPT

## 2025-01-03 PROCEDURE — 80061 LIPID PANEL: CPT

## 2025-01-03 PROCEDURE — 84153 ASSAY OF PSA TOTAL: CPT

## 2025-01-03 PROCEDURE — 85025 COMPLETE CBC W/AUTO DIFF WBC: CPT

## 2025-01-06 ENCOUNTER — HOSPITAL ENCOUNTER (OUTPATIENT)
Dept: RADIOLOGY | Facility: CLINIC | Age: 80
Discharge: HOME | End: 2025-01-06
Payer: MEDICARE

## 2025-01-06 DIAGNOSIS — C45.9 MALIGNANT NEOPLASM OF MESOTHELIAL TISSUE (MULTI): ICD-10-CM

## 2025-01-06 PROCEDURE — 71260 CT THORAX DX C+: CPT | Performed by: RADIOLOGY

## 2025-01-06 PROCEDURE — 74177 CT ABD & PELVIS W/CONTRAST: CPT

## 2025-01-06 PROCEDURE — 2550000001 HC RX 255 CONTRASTS: Performed by: INTERNAL MEDICINE

## 2025-01-06 PROCEDURE — 74177 CT ABD & PELVIS W/CONTRAST: CPT | Performed by: RADIOLOGY

## 2025-01-06 RX ADMIN — IOHEXOL 70 ML: 350 INJECTION, SOLUTION INTRAVENOUS at 13:52

## 2025-01-08 ENCOUNTER — OFFICE VISIT (OUTPATIENT)
Dept: HEMATOLOGY/ONCOLOGY | Facility: HOSPITAL | Age: 80
End: 2025-01-08
Payer: MEDICARE

## 2025-01-08 VITALS
TEMPERATURE: 97.3 F | DIASTOLIC BLOOD PRESSURE: 73 MMHG | HEART RATE: 76 BPM | OXYGEN SATURATION: 98 % | SYSTOLIC BLOOD PRESSURE: 143 MMHG | WEIGHT: 183.7 LBS | BODY MASS INDEX: 27.13 KG/M2 | RESPIRATION RATE: 18 BRPM

## 2025-01-08 DIAGNOSIS — C45.9 MALIGNANT NEOPLASM OF MESOTHELIAL TISSUE (MULTI): ICD-10-CM

## 2025-01-08 DIAGNOSIS — R93.812 ABNORMAL RADIOLOGIC FINDINGS ON DIAGNOSTIC IMAGING OF LEFT TESTICLE: ICD-10-CM

## 2025-01-08 PROCEDURE — 1036F TOBACCO NON-USER: CPT | Performed by: INTERNAL MEDICINE

## 2025-01-08 PROCEDURE — 99214 OFFICE O/P EST MOD 30 MIN: CPT | Performed by: INTERNAL MEDICINE

## 2025-01-08 PROCEDURE — 1159F MED LIST DOCD IN RCRD: CPT | Performed by: INTERNAL MEDICINE

## 2025-01-08 PROCEDURE — 3077F SYST BP >= 140 MM HG: CPT | Performed by: INTERNAL MEDICINE

## 2025-01-08 PROCEDURE — 1123F ACP DISCUSS/DSCN MKR DOCD: CPT | Performed by: INTERNAL MEDICINE

## 2025-01-08 PROCEDURE — 1157F ADVNC CARE PLAN IN RCRD: CPT | Performed by: INTERNAL MEDICINE

## 2025-01-08 PROCEDURE — 1126F AMNT PAIN NOTED NONE PRSNT: CPT | Performed by: INTERNAL MEDICINE

## 2025-01-08 PROCEDURE — 3078F DIAST BP <80 MM HG: CPT | Performed by: INTERNAL MEDICINE

## 2025-01-08 ASSESSMENT — ENCOUNTER SYMPTOMS
SWOLLEN GLANDS: 0
WEAKNESS: 0
NUMBNESS: 0
ARTHRALGIAS: 0
JOINT SWELLING: 0
FATIGUE: 0
NECK PAIN: 0
COUGH: 0
MYALGIAS: 0
VOMITING: 0
HEADACHES: 0
DIAPHORESIS: 0
CHILLS: 0
SORE THROAT: 0
NAUSEA: 0
ABDOMINAL PAIN: 0
ANOREXIA: 0
VERTIGO: 0
FEVER: 0
VISUAL CHANGE: 0
CHANGE IN BOWEL HABIT: 0

## 2025-01-08 ASSESSMENT — PAIN SCALES - GENERAL: PAINLEVEL_OUTOF10: 0-NO PAIN

## 2025-01-08 NOTE — PATIENT INSTRUCTIONS
Orders Placed:  -CT chest/abdomen/pelvis scan  -follow up appt in 3 months (4/9/25)  -Please get your blood work before your next appointment

## 2025-01-08 NOTE — PROGRESS NOTES
Patient ID: Ehsan Dias is a 79 y.o. male.    DIAGNOSIS     Malignant right pleural mesothelioma- epithelioid type.  Date of diagnosis: 4/2/2021 from VATS guided pleural mass biopsy the neoplasm shows solid and papillary growth patterns. IHC of  tumor  cells were POSITIVE for calretinin, WT-1, CK5/6 and D2-40 (weak, focal), and NEGATIVE for CEAm, TTF-1, B72.3 and MOC31,        STAGING     Initial T2N0M0  Progression in chest and peritoneum in January 2022        CURRENT SITES OF DISEASE     pleura, mediastinum, peritoneum        MOLECULAR GENOMICS     MUTYH 48.9%  BAP1 17.5%   Tumor Mutational Sneads (TMB): 5.8 m/MB Microsatellite Instability (MSI) Status: Stable           SERUM TUMOR MARKER     Not applicable        PRIOR THERAPY     1- Pleurectomy May 7th , 2021   2- 02.23.2022 : Started on Ipilimumab and Nivolumab. Documented clinical and radiographic response.  Ipilimumab dropped and nivolumab alone was continued starting December 21, 2022 given toxicities of the combination.  Last treated February 2024, completed 2 years of treatment        CURRENT THERAPY     Observation        CURRENT ONCOLOGICAL PROBLEMS     1/ R sided pleuritic chest pain , Improving  2. weight loss- in history it seems like this is intentional (need additional data points in future follow ups) - Gaining weight post surgery  3. dry cough - Significantly improved  4- Polypoid lesion seen on CT scan of the chest within the distal esophagus, referred for endoscopy.,  September 2021  5-  ascites starting Jan 2022.  Paracentesis performed in February suggest malignant ascites from mesothelioma.  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  Seen by rheumatology August 2022, prescribed Celebrex. 1/18/23 - Prednisone taper 10 mg to current 7.5 mg.  Down to prednisone 5 mg with initiation of methotrexate  in July 2023.  Patient notified me on August 23, 2023 that he is not taking his methotrexate and he is fearful of the side effects.  Continues to have polyarthritis symptoms.  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  Due to see ENT.  14- Increasing left anterior sacroiliac left inguinal left hip and pelvic pain August and September 2024.  Ordered MRI of the pelvis.  Evidence of significant left hip arthritis.  Consider orthopedic referral.  Received hip injections fall 2024   15-recommended to patient to go back to see urology given elevated PSA January 2024.       HISTORY OF PRESENT ILLNESS      His initial presentation was in July of 2018 when he presented with cc of  persistent fever, right sided pleuritic chest pain for a month. He was seen by Dr. Catie Harvey in Pulmonary clinic, routine imaging showed right side pleural effusion.  Diagnostic thoracentesis was performed on 7/13 that showed exudative effusion with neutrophilic predominance, cultures negative, cytology negative for carcinoma.   He completed course oral antibiotics and his pain and effusion resolved.  Again, had recurrence of pleuritic chest pain  and pleural effusion   later in 12/2020. The effusion was found to be worse on repeat chest imaging ( CT Chest in 2/2021). Diagnostic and therapeutic thoracentesis was done which showed exudative effusion with lymphocyte predominance. Given his recurrent ipsilateral  moderate  size pleural effusion and near complete collapse of the right lower lobe and mild right middle lobe atelectasis on CT chest- he was referred to CT surgeon and a PET CT was also obtained.      3/2021: PET CT revealed right-sided pleural effusion with multiple hypermetabolic  areas of pleural nodularity visualized along the pleural surface of the right lung, which predominantly involve the right lung base. In addition, there were multiple FDG avid mediastinal lymph nodes including enlarged subcarinal lymph nodes (maximum SUV  of 4.4), paratracheal lymph nodes (maximum SUV of 3.1), and right hilar  lymph nodes (maximum SUV of 2.9).     3/23/21: was seen by Dr. Dannielle Junior from CT surgery and was scheduled to get R VATS pleural biopsy on 4/2/21: thorascopic exploration showed some adhesions at the diaphragm to the chest wall and also  to the lung.        PAST MEDICAL HISTORY     CAD with MI s/p PCI to RCA with 2 stents,   OA R shoulder s/p replacement,   HTN   elevated PSA with FDG avidity in post lobe of prostate- followed by Dr. 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   former smoker, quit 1969, 2 packs x 8 year  = 16 pack years   smoked marijuana since 1970s; quit few months ago  Possible exposure to asbestos when worked in the [employer].          CURRENT MEDS     see med list         ALLERGIES     nkda         FAMILY HISTORY     no family h/o mesothelioma           Subjective    Cancer  Pertinent negatives include no abdominal pain, anorexia, arthralgias, change in bowel habit, chest pain, chills, congestion, coughing, diaphoresis, fatigue, fever, headaches, joint swelling, myalgias, nausea, neck pain, numbness, rash, sore throat, swollen glands, urinary symptoms, vertigo, visual change, vomiting or weakness.     Patient overall is clinically doing well notes nothing new.  His breathing is  the same, urinating well, does not wake up at night to urinate, appetite is good, no fever, no new headaches.  His hip pain and improved with the local injection and now the hip pain is back but not as bad.    Objective    BSA: 2.01 meters squared  /73   Pulse 76   Temp 36.3 °C (97.3 °F)   Resp 18   Wt 83.3 kg (183 lb 11.2 oz)   SpO2 98%   BMI 27.13 kg/m²      Physical Exam  Constitutional:       General: He is not in acute distress.     Appearance: Normal appearance. He is not ill-appearing, toxic-appearing or diaphoretic.   HENT:      Nose: No congestion or rhinorrhea.   Eyes:      General: No scleral icterus.     Conjunctiva/sclera: Conjunctivae normal.   Cardiovascular:      Rate and Rhythm: Normal rate and regular rhythm.      Pulses: Normal pulses.      Heart sounds: Normal heart sounds. No murmur heard.     No friction rub. No gallop.   Pulmonary:      Effort: Pulmonary effort is normal. No respiratory distress.      Breath sounds: Normal breath sounds. No stridor. No wheezing, rhonchi or rales.   Chest:      Chest wall: No tenderness.   Abdominal:      General: Abdomen is flat. Bowel sounds are normal. There is no distension.      Palpations: Abdomen is soft. There is no mass.      Tenderness: There is no abdominal tenderness. There is no guarding or rebound.   Musculoskeletal:      Cervical back: No tenderness.      Right lower leg: No edema.      Left lower leg: No edema.   Lymphadenopathy:      Cervical: No cervical adenopathy.   Skin:     General: Skin is warm.      Coloration: Skin is not jaundiced.   Neurological:      General: No focal deficit present.      Mental Status: He is oriented to person, place, and time.   Psychiatric:         Mood and Affect: Mood normal.         Behavior: Behavior normal.         Performance Status:  Symptomatic; fully ambulatory     Latest Reference Range & Units 01/03/25 09:56   GLUCOSE 74 - 99 mg/dL 86   SODIUM 136 - 145 mmol/L 143   POTASSIUM 3.5 - 5.3  mmol/L 4.0   CHLORIDE 98 - 107 mmol/L 102   Bicarbonate 21 - 32 mmol/L 30   Anion Gap 10 - 20 mmol/L 15   Blood Urea Nitrogen 6 - 23 mg/dL 18   Creatinine 0.50 - 1.30 mg/dL 0.74   EGFR >60 mL/min/1.73m*2 >90   Calcium 8.6 - 10.6 mg/dL 9.9   Albumin 3.4 - 5.0 g/dL 4.4   Alkaline Phosphatase 33 - 136 U/L 126   ALT 10 - 52 U/L 18   AST 9 - 39 U/L 10   Bilirubin Total 0.0 - 1.2 mg/dL 0.6   HDL CHOLESTEROL mg/dL 28.8   Cholesterol/HDL Ratio  4.0   LDL Calculated <=99 mg/dL 53   VLDL 0 - 40 mg/dL 34   TRIGLYCERIDES 0 - 149 mg/dL 170 (H)   Non HDL Cholesterol 0 - 149 mg/dL 87   Total Protein 6.4 - 8.2 g/dL 7.0   CHOLESTEROL 0 - 199 mg/dL 116   PSA <=4.00 ng/mL 6.57 (H)   WBC 4.4 - 11.3 x10*3/uL 6.4   nRBC 0.0 - 0.0 /100 WBCs 0.0   RBC 4.50 - 5.90 x10*6/uL 4.77   HEMOGLOBIN 13.5 - 17.5 g/dL 14.9   HEMATOCRIT 41.0 - 52.0 % 46.5   MCV 80 - 100 fL 98   MCH 26.0 - 34.0 pg 31.2   MCHC 32.0 - 36.0 g/dL 32.0   RED CELL DISTRIBUTION WIDTH 11.5 - 14.5 % 13.7   Platelets 150 - 450 x10*3/uL 316   Neutrophils % 40.0 - 80.0 % 59.0   Immature Granulocytes %, Automated 0.0 - 0.9 % 0.3   Lymphocytes % 13.0 - 44.0 % 26.1   Monocytes % 2.0 - 10.0 % 11.5   Eosinophils % 0.0 - 6.0 % 2.2   Basophils % 0.0 - 2.0 % 0.9   Neutrophils Absolute 1.60 - 5.50 x10*3/uL 3.80   Immature Granulocytes Absolute, Automated 0.00 - 0.50 x10*3/uL 0.02   Lymphocytes Absolute 0.80 - 3.00 x10*3/uL 1.68   Monocytes Absolute 0.05 - 0.80 x10*3/uL 0.74   Eosinophils Absolute 0.00 - 0.40 x10*3/uL 0.14   Basophils Absolute 0.00 - 0.10 x10*3/uL 0.06   (H): Data is abnormally high    CT CHEST ABDOMEN PELVIS W IV CONTRAST; 1/6/2025   IMPRESSION:  Malignant mesothelioma surveillance exam compared with CT chest and  abdomen dated 09/03/2024 and CT chest abdomen pelvis dated 01/22/2024:  1. Similar focal nodular thickening of the far inferomedial right  basilar pleura and multifocal calcifications of the right  subdiaphragmatic peritoneum. No new nodularity or masslike  thickening  of the pleura or peritoneum suggest worsening or recurrent disease.  2. Prominent right lower abdominal quadrant lymph nodes are stable  over multiple prior exams.  3. Prostatomegaly with diffuse thickening of the urinary bladder  wall, likely related to chronic outlet obstruction, however correlate  with concern for cystitis.  4. Cholelithiasis without cholecystitis and findings suggestive of  adenomyomatosis of the gallbladder fundus.  5. Additional chronic and incidental findings as above.      Assessment/Plan     This is a 79-year-old gentleman with epithelioid right pleural mesothelioma with involvement of the right pleural space and peritoneum.  He was diagnosed in April 2021 he is now close to 4 years out since his diagnosis.  He had a complete response to combination immunotherapy with ipilimumab and nivolumab.  This combination therapy was completed in February 2024.  Since then he has been on observation without evidence of disease recurrence.  I personally reviewed his most recent imaging showing no progression.  Will see him back in 2 months time with repeat imaging studies.  I recommended that he go see his urologist given there persistent elevation in PSA.    Cancer Staging   No matching staging information was found for the patient.      Oncology History   Malignant neoplasm of mesothelial tissue (Multi)   2/27/2023 Initial Diagnosis    Malignant neoplasm of mesothelial tissue (CMS/HCC)     10/4/2023 - 2/7/2024 Chemotherapy    Nivolumab 240 mg (Biweekly), 28 Day Cycles                   hSawn Blum MD

## 2025-01-24 ENCOUNTER — APPOINTMENT (OUTPATIENT)
Dept: RHEUMATOLOGY | Facility: CLINIC | Age: 80
End: 2025-01-24
Payer: MEDICARE

## 2025-01-27 ENCOUNTER — APPOINTMENT (OUTPATIENT)
Dept: PRIMARY CARE | Facility: CLINIC | Age: 80
End: 2025-01-27
Payer: MEDICARE

## 2025-03-05 ENCOUNTER — APPOINTMENT (OUTPATIENT)
Facility: CLINIC | Age: 80
End: 2025-03-05
Payer: MEDICARE

## 2025-03-05 VITALS
TEMPERATURE: 98.2 F | HEART RATE: 76 BPM | SYSTOLIC BLOOD PRESSURE: 142 MMHG | BODY MASS INDEX: 26.2 KG/M2 | DIASTOLIC BLOOD PRESSURE: 79 MMHG | HEIGHT: 70 IN | WEIGHT: 183 LBS

## 2025-03-05 DIAGNOSIS — L40.9 PSORIASIS: ICD-10-CM

## 2025-03-05 DIAGNOSIS — M19.90 INFLAMMATORY ARTHRITIS: Primary | ICD-10-CM

## 2025-03-05 DIAGNOSIS — D84.9 IMMUNOSUPPRESSION: ICD-10-CM

## 2025-03-05 DIAGNOSIS — M47.812 OSTEOARTHRITIS OF CERVICAL SPINE, UNSPECIFIED SPINAL OSTEOARTHRITIS COMPLICATION STATUS: ICD-10-CM

## 2025-03-05 DIAGNOSIS — T45.AX5D ADVERSE EFFECT OF IMMUNE CHECKPOINT INHIBITOR, SUBSEQUENT ENCOUNTER: ICD-10-CM

## 2025-03-05 PROBLEM — T45.AX5A: Status: ACTIVE | Noted: 2025-03-05

## 2025-03-05 PROCEDURE — 1123F ACP DISCUSS/DSCN MKR DOCD: CPT | Performed by: INTERNAL MEDICINE

## 2025-03-05 PROCEDURE — 99214 OFFICE O/P EST MOD 30 MIN: CPT | Performed by: INTERNAL MEDICINE

## 2025-03-05 PROCEDURE — G2211 COMPLEX E/M VISIT ADD ON: HCPCS | Performed by: INTERNAL MEDICINE

## 2025-03-05 PROCEDURE — 3078F DIAST BP <80 MM HG: CPT | Performed by: INTERNAL MEDICINE

## 2025-03-05 PROCEDURE — 1159F MED LIST DOCD IN RCRD: CPT | Performed by: INTERNAL MEDICINE

## 2025-03-05 PROCEDURE — 3077F SYST BP >= 140 MM HG: CPT | Performed by: INTERNAL MEDICINE

## 2025-03-05 PROCEDURE — 1157F ADVNC CARE PLAN IN RCRD: CPT | Performed by: INTERNAL MEDICINE

## 2025-03-05 RX ORDER — METHOTREXATE 2.5 MG/1
2.5 TABLET ORAL
COMMUNITY
Start: 2025-01-29

## 2025-03-05 ASSESSMENT — ENCOUNTER SYMPTOMS
PSYCHIATRIC NEGATIVE: 1
GASTROINTESTINAL NEGATIVE: 1
EYES NEGATIVE: 1
CONSTITUTIONAL NEGATIVE: 1
ARTHRALGIAS: 1
NEUROLOGICAL NEGATIVE: 1
HEMATOLOGIC/LYMPHATIC NEGATIVE: 1
CARDIOVASCULAR NEGATIVE: 1
ALLERGIC/IMMUNOLOGIC NEGATIVE: 1

## 2025-03-05 NOTE — PROGRESS NOTES
Subjective   Patient ID: [id number]   Ehsan Dias is a 79 y.o. male who presents for Rheumatoid Arthritis.  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 Past medical history includes that of hypertension, hyperlipidemia, hypothyroidism, coronary artery disease and most importantly, mesothelioma. For mesothelioma, he follows with oncologist. He was diagnosed on 4/2021, about 1 year ago, he was started on combination of ipilimumab and nivolumab. He developed significant joint pain/inflammatory arthritis 6 months later. Thereafter, ipilimumab was discontinued. He was started on Plaquenil. At the same time, he developed a skin rash and it was not certain whether this was related to Plaquenil or ipilimumab. Irrespective, the Plaquenil was discontinued. He was treated with prednisone and reports that the joint symptoms are well with prednisone 10 mg a day.   At his recent follow-up with his rheumatologist, Dr. Segura, it was advised that he consider reducing the dose of prednisone.  Today, patient mentions that he has been taking prednisone 7.5 mg a day and continues to have some discomfort on his MCPs as well as wrist joints. He reports protracted morning stiffness. He does not report much discomfort in other joints including feet, knees, ankles, shoulders or elbows. He does not report much discomfort on neck or back. He does have a history of right shoulder replacement.  Patient questions whether it is reasonable to take prednisone 10 mg a day on a regular basis.      ICI history for pleural mesothelioma  Started on Ipilimumab and Nivolumab. Documented clinical and radiographic response.  Ipilimumab dropped and nivolumab alone was continued starting December 21, 2022 given toxicities of the combination.  Last treated February 2024, completed 2 years of treatment        9 /2024  c/o MST in the am, some achiness when make a full fist  Has not noticed any swelling able to make full fist  Able to work out in the gym 3 times / week  Has been having pain in the left hip for a weeks, was over exerting himself then pain progressed  Now walks with a limp, and taking NSAID almost daily.  10/2024  CSI to L hip performed under US guidance      Review of Systems   Constitutional: Negative.    HENT: Negative.     Eyes: Negative.    Cardiovascular: Negative.    Gastrointestinal: Negative.    Genitourinary: Negative.    Musculoskeletal:  Positive for arthralgias.   Skin: Negative.    Allergic/Immunologic: Negative.    Neurological: Negative.    Hematological: Negative.    Psychiatric/Behavioral: Negative.         Objective   Physical Exam    No synovitis today    Previously Grade 1/2 synovial proliferation of the right radiocarpal joint  Absent doppler  No synovitis in the MCP  Restricted hip movement Lt side  Severely limited adduction and abduction  Tenderness with FADIR and STANLEY in the groin    Rest of exam as below    Spine: Normal posture, no point tenderness to palpation, normal ROM  Upper Extremities:   Hands: Tender 2nd bilaterally with minimal synovial thickening  Wrists:  Tender wrist - extensor aspect wo swelling b/l good ROM  Tinnels +ve     Elbows: No erythema, warmth, synovitis, or pain; normal ROM   Shoulders: No erythema, warmth, appreciable swelling, or pain; normal ROM   Lower Extremities:   Hips: No gross deformity, erythema, warmth, or pain; normal ROM   Knees: No erythema,  warmth, swelling, or pain; normal ROM   Ankles: No erythema, warmth, synovitis, or pain; normal ROM  Feet: No gross deformity, erythema, or swelling; MTP squeeze negative bilaterally     Lab Results   Component Value Date    WBC 6.4 01/03/2025    HGB 14.9 01/03/2025    HCT 46.5 01/03/2025    MCV 98 01/03/2025     01/03/2025      Lab Results   Component Value Date    GLUCOSE 86 01/03/2025    CALCIUM 9.9 01/03/2025     01/03/2025    K 4.0 01/03/2025    CO2 30 01/03/2025     01/03/2025    BUN 18 01/03/2025    CREATININE 0.74 01/03/2025      Assessment/Plan   Diagnoses and all orders for this visit:  Polyarthritis, inflammatory (CMS/HCC)  ICI induced arthritis ( seronegative RA)  Immunosuppression    Grade 2 iAER  CDAI 3 today ( 0 TJC, 0 SJC)  Previously upto 6 TJC ( last visit 2 TJC with moderate CDAI)  Now in remission- c/o some achiness in the hands  But no synovitis on exam and by ultrasound - only grade 1/2 synovial proliferation in the radiocarpal joint seen with absent doppler last visit  Had stopped methotrexate for a few months by himself  Has not had any flare  Now has psoriasis of the scalp  Advised to resume methotrexate  Unsure if he truly had ICI arthritis vs PseudoRA pattern of psoriatic arthritis in the first place  Neverthless, he was doing ok on methotrexate so will resume the same  20 mg weekly and folic acid daily  He was informed in future we could taper the methotrexate if his psoriasis and arthritis are both in remission  Osteopenia with High Frax  But wants to wait before starting anti resoprtives  Risks vs benefits explained  He is actively working out and taking Vit D and Ca consistently.  Now off prednisone for many months    Hx of CTS - predominantly left hand  Currently , asymptomatic  No nocturnal symptoms  No weakness on exam today    DJD cervical spine, lumbar spine, severe OA of hips  Being referred to pain management    HM-  UTD with vaccines    Follows with hemonc  for hx of mesothelioma     Surveillance labs ordered    Elder Barton MD  FACR  Elder Barton MD   of Medicine  [facility] - Department of Rheumatology  [facility]

## 2025-03-05 NOTE — PATIENT INSTRUCTIONS
Continue methotrexate 4 tabs am and 4 tabs pm every week  Take folic acid daily    See spine ortho and pain management doctors for the back issue    Please do blood work a couple of weeks before appointment in 3 mo  I will see you back in 3-4 months, we could titrate methotrexate further at that time

## 2025-03-11 ENCOUNTER — APPOINTMENT (OUTPATIENT)
Dept: PRIMARY CARE | Facility: CLINIC | Age: 80
End: 2025-03-11
Payer: MEDICARE

## 2025-03-11 VITALS
HEIGHT: 69 IN | BODY MASS INDEX: 27.11 KG/M2 | OXYGEN SATURATION: 97 % | WEIGHT: 183 LBS | HEART RATE: 74 BPM | RESPIRATION RATE: 12 BRPM | SYSTOLIC BLOOD PRESSURE: 116 MMHG | DIASTOLIC BLOOD PRESSURE: 66 MMHG

## 2025-03-11 DIAGNOSIS — H91.93 BILATERAL HEARING LOSS, UNSPECIFIED HEARING LOSS TYPE: ICD-10-CM

## 2025-03-11 DIAGNOSIS — Z00.00 HEALTHCARE MAINTENANCE: Primary | ICD-10-CM

## 2025-03-11 PROCEDURE — 1036F TOBACCO NON-USER: CPT | Performed by: FAMILY MEDICINE

## 2025-03-11 PROCEDURE — 1160F RVW MEDS BY RX/DR IN RCRD: CPT | Performed by: FAMILY MEDICINE

## 2025-03-11 PROCEDURE — 3074F SYST BP LT 130 MM HG: CPT | Performed by: FAMILY MEDICINE

## 2025-03-11 PROCEDURE — 1157F ADVNC CARE PLAN IN RCRD: CPT | Performed by: FAMILY MEDICINE

## 2025-03-11 PROCEDURE — 1159F MED LIST DOCD IN RCRD: CPT | Performed by: FAMILY MEDICINE

## 2025-03-11 PROCEDURE — 3078F DIAST BP <80 MM HG: CPT | Performed by: FAMILY MEDICINE

## 2025-03-11 PROCEDURE — 1124F ACP DISCUSS-NO DSCNMKR DOCD: CPT | Performed by: FAMILY MEDICINE

## 2025-03-11 PROCEDURE — G0439 PPPS, SUBSEQ VISIT: HCPCS | Performed by: FAMILY MEDICINE

## 2025-03-11 PROCEDURE — 1170F FXNL STATUS ASSESSED: CPT | Performed by: FAMILY MEDICINE

## 2025-03-11 RX ORDER — FLUTICASONE PROPIONATE 0.5 MG/G
CREAM TOPICAL
COMMUNITY
Start: 2025-02-12

## 2025-03-11 RX ORDER — KETOCONAZOLE 20 MG/ML
SHAMPOO, SUSPENSION TOPICAL
COMMUNITY
Start: 2025-02-12

## 2025-03-11 RX ORDER — CLOBETASOL PROPIONATE 0.5 MG/ML
SOLUTION TOPICAL
COMMUNITY
Start: 2025-02-27

## 2025-03-11 ASSESSMENT — ENCOUNTER SYMPTOMS
BLOOD IN STOOL: 0
APPETITE CHANGE: 0
BACK PAIN: 1
DEPRESSION: 0
ARTHRALGIAS: 1
CHILLS: 0
HEADACHES: 0
NERVOUS/ANXIOUS: 0
FEVER: 0
ADENOPATHY: 0
CONSTIPATION: 0
ABDOMINAL PAIN: 0
EYE REDNESS: 0
SHORTNESS OF BREATH: 0
OCCASIONAL FEELINGS OF UNSTEADINESS: 0
LOSS OF SENSATION IN FEET: 0
DIARRHEA: 0
CHEST TIGHTNESS: 0
SORE THROAT: 0
BRUISES/BLEEDS EASILY: 0
DIFFICULTY URINATING: 0
FATIGUE: 0
ABDOMINAL DISTENTION: 0
DIZZINESS: 0
COUGH: 0
DYSPHORIC MOOD: 0
WEAKNESS: 0
DYSURIA: 0
EYE PAIN: 0

## 2025-03-11 ASSESSMENT — PATIENT HEALTH QUESTIONNAIRE - PHQ9
10. IF YOU CHECKED OFF ANY PROBLEMS, HOW DIFFICULT HAVE THESE PROBLEMS MADE IT FOR YOU TO DO YOUR WORK, TAKE CARE OF THINGS AT HOME, OR GET ALONG WITH OTHER PEOPLE: NOT DIFFICULT AT ALL
6. FEELING BAD ABOUT YOURSELF - OR THAT YOU ARE A FAILURE OR HAVE LET YOURSELF OR YOUR FAMILY DOWN: NOT AT ALL
SUM OF ALL RESPONSES TO PHQ9 QUESTIONS 1 AND 2: 0
2. FEELING DOWN, DEPRESSED OR HOPELESS: NOT AT ALL
5. POOR APPETITE OR OVEREATING: NOT AT ALL
SUM OF ALL RESPONSES TO PHQ QUESTIONS 1-9: 0
9. THOUGHTS THAT YOU WOULD BE BETTER OFF DEAD, OR OF HURTING YOURSELF: NOT AT ALL
4. FEELING TIRED OR HAVING LITTLE ENERGY: NOT AT ALL
7. TROUBLE CONCENTRATING ON THINGS, SUCH AS READING THE NEWSPAPER OR WATCHING TELEVISION: NOT AT ALL
3. TROUBLE FALLING OR STAYING ASLEEP OR SLEEPING TOO MUCH: NOT AT ALL
8. MOVING OR SPEAKING SO SLOWLY THAT OTHER PEOPLE COULD HAVE NOTICED. OR THE OPPOSITE, BEING SO FIGETY OR RESTLESS THAT YOU HAVE BEEN MOVING AROUND A LOT MORE THAN USUAL: NOT AT ALL
1. LITTLE INTEREST OR PLEASURE IN DOING THINGS: NOT AT ALL

## 2025-03-11 ASSESSMENT — ACTIVITIES OF DAILY LIVING (ADL)
DOING_HOUSEWORK: INDEPENDENT
BATHING: INDEPENDENT
DRESSING: INDEPENDENT
TAKING_MEDICATION: INDEPENDENT
MANAGING_FINANCES: INDEPENDENT
GROCERY_SHOPPING: INDEPENDENT

## 2025-03-11 NOTE — PROGRESS NOTES
"Subjective   Patient ID: Ehsan Dias is a 79 y.o. male who presents for Medicare Annual Wellness Visit Subsequent.  PMHX, PSHx, Fam hx, and Social hx reviewed.   Vaccines COVID, RSV, and TDAP  Dentist seen at least yearly yes  Vision concerns none  Hearing concerns  - worsening gradually would like audiology  Diet is not overall healthy.   Smoker - no  Lung CT/screening - NA  AAA screening - 2020, negative  Alcohol use - averages 0drinks per week  Exercising 0 days per week.   Sexually active - no, he has ED and sees Urology  Colonoscopy NA  ACP - advance directives, code status, and DPOA documentation discussed             Review of Systems   Constitutional:  Negative for appetite change, chills, fatigue and fever.   HENT:  Positive for hearing loss. Negative for congestion and sore throat.    Eyes:  Negative for pain, redness and visual disturbance.   Respiratory:  Negative for cough, chest tightness and shortness of breath.    Cardiovascular:  Negative for chest pain and leg swelling.   Gastrointestinal:  Negative for abdominal distention, abdominal pain, blood in stool, constipation and diarrhea.   Genitourinary:  Negative for difficulty urinating and dysuria.   Musculoskeletal:  Positive for arthralgias and back pain.   Skin:  Negative for rash.   Neurological:  Negative for dizziness, weakness and headaches.   Hematological:  Negative for adenopathy. Does not bruise/bleed easily.   Psychiatric/Behavioral:  Negative for dysphoric mood. The patient is not nervous/anxious.        Objective   /66   Pulse 74   Resp 12   Ht 1.753 m (5' 9\")   Wt 83 kg (183 lb)   SpO2 97%   BMI 27.02 kg/m²    Physical Exam  Constitutional:       General: He is not in acute distress.     Appearance: Normal appearance. He is not ill-appearing.   HENT:      Head: Normocephalic and atraumatic.      Right Ear: Tympanic membrane, ear canal and external ear normal.      Left Ear: Tympanic membrane, ear canal and external " ear normal.      Nose: Nose normal.      Mouth/Throat:      Mouth: Mucous membranes are moist.      Pharynx: No oropharyngeal exudate or posterior oropharyngeal erythema.   Eyes:      Extraocular Movements: Extraocular movements intact.      Conjunctiva/sclera: Conjunctivae normal.      Pupils: Pupils are equal, round, and reactive to light.   Neck:      Thyroid: No thyroid mass or thyromegaly.      Vascular: No carotid bruit.   Cardiovascular:      Rate and Rhythm: Normal rate and regular rhythm.      Heart sounds: Normal heart sounds. No murmur heard.  Pulmonary:      Breath sounds: Normal breath sounds. No wheezing, rhonchi or rales.   Abdominal:      General: Bowel sounds are normal. There is no distension.      Palpations: Abdomen is soft. There is no mass.      Tenderness: There is no abdominal tenderness.   Musculoskeletal:         General: No swelling or deformity.      Cervical back: Neck supple. No tenderness.   Lymphadenopathy:      Cervical: No cervical adenopathy.   Skin:     General: Skin is warm and dry.      Findings: No lesion or rash.   Neurological:      Mental Status: He is alert and oriented to person, place, and time.      Sensory: No sensory deficit.      Motor: No weakness.      Coordination: Coordination normal.      Deep Tendon Reflexes: Reflexes normal.   Psychiatric:         Mood and Affect: Mood normal.         Behavior: Behavior normal.         Judgment: Judgment normal.       Medicare Wellness Billing Compliance Satisfied    *This is a visual tool to show completion of required items on the day of the visit. Green checks will only appear on the date of visit.    Review all medications by prescribing practitioner or clinical pharmacist (such as prescriptions, OTCs, herbal therapies and supplements) documented in the medical record    Past Medical, Surgical, and Family History reviewed and updated in chart    Tobacco Use Reviewed    Alcohol Use Reviewed    Illicit Drug Use Reviewed     PHQ2/9    Falls in Last Year Reviewed    Home Safety Risk Factors Reviewed    Cognitive Impairment Reviewed    Patient Self Assessment and Health Status    Current Diet Reviewed    Exercise Frequency    ADL - Hearing Impairment    ADL - Bathing    ADL - Dressing    ADL - Walks in Home    IADL - Managing Finances    IADL - Grocery Shopping    IADL - Taking Medications    IADL - Doing Housework        Assessment/Plan   Diagnoses and all orders for this visit:  Healthcare maintenance - Tetanus, COVID, Flu and RSV vaccines recommended. Other vaccines current. Colonoscopy not indicated. Referring for formal hearing testing. Rechecking labs before next visit.     Follow up in May 30mins

## 2025-03-11 NOTE — PROGRESS NOTES
"Subjective   Reason for Visit: Ehsan Dias is an 79 y.o. male here for a Medicare Wellness visit.     Past Medical, Surgical, and Family History reviewed and updated in chart.    Reviewed all medications by prescribing practitioner or clinical pharmacist (such as prescriptions, OTCs, herbal therapies and supplements) and documented in the medical record.    HPI    Patient Care Team:  Vikash De Los Santos MD as PCP - General  Shawn Blum MD as Consulting Physician (Hematology and Oncology)  Ángel Ambrocio MD as Consulting Physician (Cardiology)     Review of Systems    Objective   Vitals:  /66   Pulse 74   Resp 12   Ht 1.753 m (5' 9\")   Wt 83 kg (183 lb)   SpO2 97%   BMI 27.02 kg/m²       Physical Exam    Assessment & Plan              "

## 2025-04-01 ENCOUNTER — LAB (OUTPATIENT)
Dept: LAB | Facility: HOSPITAL | Age: 80
End: 2025-04-01
Payer: MEDICARE

## 2025-04-01 DIAGNOSIS — R93.812 ABNORMAL RADIOLOGIC FINDINGS ON DIAGNOSTIC IMAGING OF LEFT TESTICLE: ICD-10-CM

## 2025-04-01 DIAGNOSIS — C45.9 MESOTHELIOMA, UNSPECIFIED: Primary | ICD-10-CM

## 2025-04-01 DIAGNOSIS — C45.9 MALIGNANT NEOPLASM OF MESOTHELIAL TISSUE (MULTI): ICD-10-CM

## 2025-04-01 LAB
ALBUMIN SERPL BCP-MCNC: 4.3 G/DL (ref 3.4–5)
ALP SERPL-CCNC: 127 U/L (ref 33–136)
ALT SERPL W P-5'-P-CCNC: 23 U/L (ref 10–52)
ANION GAP SERPL CALC-SCNC: 12 MMOL/L (ref 10–20)
AST SERPL W P-5'-P-CCNC: 22 U/L (ref 9–39)
BASOPHILS # BLD AUTO: 0.06 X10*3/UL (ref 0–0.1)
BASOPHILS NFR BLD AUTO: 0.8 %
BILIRUB SERPL-MCNC: 0.6 MG/DL (ref 0–1.2)
BUN SERPL-MCNC: 17 MG/DL (ref 6–23)
CALCIUM SERPL-MCNC: 9.9 MG/DL (ref 8.6–10.6)
CHLORIDE SERPL-SCNC: 98 MMOL/L (ref 98–107)
CO2 SERPL-SCNC: 32 MMOL/L (ref 21–32)
CREAT SERPL-MCNC: 0.85 MG/DL (ref 0.5–1.3)
EGFRCR SERPLBLD CKD-EPI 2021: 88 ML/MIN/1.73M*2
EOSINOPHIL # BLD AUTO: 0.11 X10*3/UL (ref 0–0.4)
EOSINOPHIL NFR BLD AUTO: 1.5 %
ERYTHROCYTE [DISTWIDTH] IN BLOOD BY AUTOMATED COUNT: 14.4 % (ref 11.5–14.5)
GLUCOSE SERPL-MCNC: 84 MG/DL (ref 74–99)
HCT VFR BLD AUTO: 46 % (ref 41–52)
HGB BLD-MCNC: 14.8 G/DL (ref 13.5–17.5)
IMM GRANULOCYTES # BLD AUTO: 0.03 X10*3/UL (ref 0–0.5)
IMM GRANULOCYTES NFR BLD AUTO: 0.4 % (ref 0–0.9)
LYMPHOCYTES # BLD AUTO: 1.62 X10*3/UL (ref 0.8–3)
LYMPHOCYTES NFR BLD AUTO: 21.5 %
MCH RBC QN AUTO: 30.5 PG (ref 26–34)
MCHC RBC AUTO-ENTMCNC: 32.2 G/DL (ref 32–36)
MCV RBC AUTO: 95 FL (ref 80–100)
MONOCYTES # BLD AUTO: 0.63 X10*3/UL (ref 0.05–0.8)
MONOCYTES NFR BLD AUTO: 8.4 %
NEUTROPHILS # BLD AUTO: 5.07 X10*3/UL (ref 1.6–5.5)
NEUTROPHILS NFR BLD AUTO: 67.4 %
NRBC BLD-RTO: 0 /100 WBCS (ref 0–0)
PLATELET # BLD AUTO: 350 X10*3/UL (ref 150–450)
POTASSIUM SERPL-SCNC: 4.3 MMOL/L (ref 3.5–5.3)
PROT SERPL-MCNC: 7.4 G/DL (ref 6.4–8.2)
RBC # BLD AUTO: 4.86 X10*6/UL (ref 4.5–5.9)
SODIUM SERPL-SCNC: 138 MMOL/L (ref 136–145)
TSH SERPL-ACNC: 0.68 MIU/L (ref 0.44–3.98)
WBC # BLD AUTO: 7.5 X10*3/UL (ref 4.4–11.3)

## 2025-04-01 PROCEDURE — 84443 ASSAY THYROID STIM HORMONE: CPT

## 2025-04-01 PROCEDURE — 36415 COLL VENOUS BLD VENIPUNCTURE: CPT

## 2025-04-01 PROCEDURE — 80053 COMPREHEN METABOLIC PANEL: CPT

## 2025-04-01 PROCEDURE — 85025 COMPLETE CBC W/AUTO DIFF WBC: CPT

## 2025-04-02 NOTE — PROGRESS NOTES
NPV     HISTORY OF PRESENT ILLNESS:   Ehsan Dias is a 79 y.o. male who is being seen today for BPH with nocturia and elevated PSA    PSA trend:  Lab Results   Component Value Date    PSA 6.57 (H) 01/03/2025    PSA 7.03 (H) 10/13/2022    PSA 5.40 (H) 09/23/2021    PSA 7.43 (H) 12/14/2020       PAST MEDICAL HISTORY:  Past Medical History:   Diagnosis Date    Abdominal cramping 02/27/2023    Anemia due to blood loss 09/15/2023    Atherosclerosis of aorta (CMS-HCC) 10/10/2023    CAD (coronary artery disease) 02/27/2023    Inferior wall MI, s/p FROY x2 to RCA. 70% on revascularized LAD. 2001 [facility].    Depression 09/15/2023    Edema, lower extremity 09/15/2023    Gender identity uncertainty in adult 09/15/2023    Hypercholesterolemia 02/27/2023    Dr. 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    Other fatigue 09/15/2023    Personal history of diseases of the blood and blood-forming organs and certain disorders involving the immune mechanism 07/20/2017    History of thrombocytosis    Personal history of malignant neoplasm of soft tissue 11/18/2021    History of malignant mesothelioma    Personal history of other diseases of male genital organs 11/11/2019    History of impotence    Personal history of transient ischemic attack (TIA), and cerebral infarction without residual deficits 12/14/2021    History of transient ischemic attack    Pleural mass 02/27/2023    Recurrent major depressive disorder, in partial remission (CMS-HCC) 02/27/2023    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    Skin irritation 09/15/2023    SOB (shortness of breath) on exertion 09/15/2023    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    Transient ischemic attack 09/23/2024     Comment on above: 2005 Diplopia;      Viremia 09/15/2023    Xerosis cutis 11/15/2022       PAST SURGICAL HISTORY:  Past Surgical History:   Procedure Laterality Date    LUNG DECORTICATION Right     OTHER SURGICAL HISTORY  05/26/2021    Pleurectomy with decortication    OTHER SURGICAL HISTORY  05/26/2021    Cardiac catheterization with stent placement    OTHER SURGICAL HISTORY  05/26/2021    Thoracentesis    OTHER SURGICAL HISTORY  04/07/2021    Bronchoscopy    OTHER SURGICAL HISTORY  04/07/2021    Pleural biopsy    SHOULDER SURGERY Right 02/09/2015    Shoulder Surgery    US GUIDED ABDOMINAL PARACENTESIS  02/18/2022    US GUIDED ABDOMINAL PARACENTESIS 2/18/2022 [facility] [place] [facility]    US GUIDED ABDOMINAL PARACENTESIS  03/11/2022    US GUIDED ABDOMINAL PARACENTESIS 3/11/2022 [facility] [place] [facility]    US GUIDED ABDOMINAL PARACENTESIS  03/22/2022    US GUIDED ABDOMINAL PARACENTESIS 3/22/2022 [facility] [place] [facility]    US GUIDED ABDOMINAL PARACENTESIS  04/01/2022    US GUIDED ABDOMINAL PARACENTESIS 4/1/2022 [facility] [place] [facility]        ALLERGIES:   Allergies   Allergen Reactions    Doxycycline Anaphylaxis    Hydroxychloroquine Rash and Hives     all over body rash admited for it    Clopidogrel Unknown    Dyclonine Unknown        MEDICATIONS:   Current Outpatient Medications   Medication Instructions    aspirin 81 mg EC tablet Take by mouth.    atorvastatin (LIPITOR) 80 mg, oral, Daily    b complex 0.4 mg tablet 1 tablet, Daily    CASEIN-MILK,NFAT,SKIM-PALM OIL ORAL Take by mouth.    clobetasol (Temovate) 0.05 % external solution APPLY TWICE DAILY TO SCALP FOR 2 WEEKS, THEN AS NEEDED FOR FLARES    creatine (bulk) 5 g, Daily    dilTIAZem CD (CARDIZEM CD) 240 mg, oral, Daily    FLAXSEED MM Use in the mouth or throat.    fluticasone (Cutivate) 0.05 % cream APPLY TO RASH ON EARS TWICE DAILY FOR 2 WEEKS, THEN AS NEEDED FOR FLARES    folic acid (FOLVITE) 1 mg, oral, Daily    hydroCHLOROthiazide (MICROZIDE) 12.5 mg, oral, Daily    ketoconazole (NIZOral) 2 %  "shampoo WASH SCALP AND FACE 2-3X PER WEEK. LET SIT FOR 10 MINUTES, THEN RINSE.    levothyroxine (SYNTHROID, LEVOXYL) 100 mcg, oral, Daily    lisinopril 10 mg, oral, Daily    methotrexate (TREXALL) 2.5 mg    metoprolol succinate XL (TOPROL-XL) 100 mg, oral, 2 times daily, Do not crush or chew.    nitroglycerin (NITROSTAT) 0.4 mg, sublingual, Every 5 min PRN    whey prot/arg/glu/C/Zn//tos (WHEY PROT-ARG-GLUT-C-ZN-CU-TOS ORAL) Take by mouth.        PHYSICAL EXAM:  There were no vitals taken for this visit.  Constitutional: Patient appears well-developed and well-nourished. No distress.    Pulmonary/Chest: Effort normal. No respiratory distress.   Abdominal: Soft, ND NT  : WNL  Musculoskeletal: Normal range of motion.    Neurological: Alert and oriented to person, place, and time.  Psychiatric: Normal mood and affect. Behavior is normal. Thought content normal.      Labs:  No results found for: \"TESTOSTERONE\"  Lab Results   Component Value Date    PSA 6.57 (H) 01/03/2025    PSA 7.03 (H) 10/13/2022    PSA 5.40 (H) 09/23/2021    PSA 7.43 (H) 12/14/2020     No components found for: \"CBC\"  Lab Results   Component Value Date    CREATININE 0.85 04/01/2025     No components found for: \"TESTOTMS\"  Lab Results   Component Value Date    TESTF 49.6 09/25/2024       PVR: 2ml  AUA: 1  JUAN C:1    Imaging:     Discussion: No urinary complaints at this time. Denies hematuria, dysuria, nocturia, flank pain, and bothersome frequency or urgency. Denies pushing or straining to void and states he feels he empties his bladder when voiding. Patient had a negative biopsy in the past. He has also had a past more elevated PSA number. Discussed safe to keep monitoring PSA. Patient in agreement. Also reports low libido- will check testosterone level and refer to Dr LEDEZMA to address ED.       Assessment:      1. Elevated PSA  Referral to Urology    Measure post void residual      2. Benign prostatic hyperplasia with nocturia  Referral to Urology    "   3. Male erectile disorder  Referral to Urology             Plan:   Check total and free testosterone  Will refer to Dr Puga for ED  All questions and concerns were addressed. Patient verbalizes understanding and has no other questions at this time.

## 2025-04-03 ENCOUNTER — OFFICE VISIT (OUTPATIENT)
Dept: UROLOGY | Facility: HOSPITAL | Age: 80
End: 2025-04-03
Payer: MEDICARE

## 2025-04-03 DIAGNOSIS — N40.1 BENIGN PROSTATIC HYPERPLASIA WITH NOCTURIA: ICD-10-CM

## 2025-04-03 DIAGNOSIS — R97.20 ELEVATED PSA: Primary | ICD-10-CM

## 2025-04-03 DIAGNOSIS — R35.1 BENIGN PROSTATIC HYPERPLASIA WITH NOCTURIA: ICD-10-CM

## 2025-04-03 DIAGNOSIS — N52.9 MALE ERECTILE DISORDER: ICD-10-CM

## 2025-04-03 PROCEDURE — G2211 COMPLEX E/M VISIT ADD ON: HCPCS | Performed by: UROLOGY

## 2025-04-03 PROCEDURE — 99214 OFFICE O/P EST MOD 30 MIN: CPT | Performed by: UROLOGY

## 2025-04-03 PROCEDURE — 99214 OFFICE O/P EST MOD 30 MIN: CPT | Mod: 25 | Performed by: UROLOGY

## 2025-04-03 PROCEDURE — 51798 US URINE CAPACITY MEASURE: CPT | Performed by: UROLOGY

## 2025-04-03 PROCEDURE — 1157F ADVNC CARE PLAN IN RCRD: CPT | Performed by: UROLOGY

## 2025-04-03 PROCEDURE — 1123F ACP DISCUSS/DSCN MKR DOCD: CPT | Performed by: UROLOGY

## 2025-04-03 PROCEDURE — 1159F MED LIST DOCD IN RCRD: CPT | Performed by: UROLOGY

## 2025-04-04 ENCOUNTER — HOSPITAL ENCOUNTER (OUTPATIENT)
Dept: RADIOLOGY | Facility: CLINIC | Age: 80
Discharge: HOME | End: 2025-04-04
Payer: MEDICARE

## 2025-04-04 DIAGNOSIS — C45.9 MALIGNANT NEOPLASM OF MESOTHELIAL TISSUE (MULTI): ICD-10-CM

## 2025-04-04 PROCEDURE — 2550000001 HC RX 255 CONTRASTS: Performed by: INTERNAL MEDICINE

## 2025-04-04 PROCEDURE — 71260 CT THORAX DX C+: CPT

## 2025-04-04 RX ADMIN — IOHEXOL 67 ML: 350 INJECTION, SOLUTION INTRAVENOUS at 09:48

## 2025-04-09 ENCOUNTER — OFFICE VISIT (OUTPATIENT)
Dept: HEMATOLOGY/ONCOLOGY | Facility: HOSPITAL | Age: 80
End: 2025-04-09
Payer: MEDICARE

## 2025-04-09 VITALS
SYSTOLIC BLOOD PRESSURE: 137 MMHG | HEART RATE: 77 BPM | WEIGHT: 184.9 LBS | OXYGEN SATURATION: 96 % | BODY MASS INDEX: 27.3 KG/M2 | RESPIRATION RATE: 18 BRPM | TEMPERATURE: 97.2 F | DIASTOLIC BLOOD PRESSURE: 74 MMHG

## 2025-04-09 DIAGNOSIS — C45.9 MALIGNANT NEOPLASM OF MESOTHELIAL TISSUE (MULTI): ICD-10-CM

## 2025-04-09 DIAGNOSIS — R93.89 ABNORMAL FINDINGS ON DIAGNOSTIC IMAGING OF OTHER SPECIFIED BODY STRUCTURES: ICD-10-CM

## 2025-04-09 PROCEDURE — 1036F TOBACCO NON-USER: CPT | Performed by: INTERNAL MEDICINE

## 2025-04-09 PROCEDURE — 3078F DIAST BP <80 MM HG: CPT | Performed by: INTERNAL MEDICINE

## 2025-04-09 PROCEDURE — 1123F ACP DISCUSS/DSCN MKR DOCD: CPT | Performed by: INTERNAL MEDICINE

## 2025-04-09 PROCEDURE — 1157F ADVNC CARE PLAN IN RCRD: CPT | Performed by: INTERNAL MEDICINE

## 2025-04-09 PROCEDURE — 3075F SYST BP GE 130 - 139MM HG: CPT | Performed by: INTERNAL MEDICINE

## 2025-04-09 PROCEDURE — 99214 OFFICE O/P EST MOD 30 MIN: CPT | Performed by: INTERNAL MEDICINE

## 2025-04-09 PROCEDURE — 1126F AMNT PAIN NOTED NONE PRSNT: CPT | Performed by: INTERNAL MEDICINE

## 2025-04-09 PROCEDURE — 1159F MED LIST DOCD IN RCRD: CPT | Performed by: INTERNAL MEDICINE

## 2025-04-09 ASSESSMENT — ENCOUNTER SYMPTOMS
VOMITING: 0
FATIGUE: 0
MYALGIAS: 0
COUGH: 0
NAUSEA: 0
VISUAL CHANGE: 0
DIAPHORESIS: 0
JOINT SWELLING: 0
CHANGE IN BOWEL HABIT: 0
FEVER: 0
NECK PAIN: 0
ABDOMINAL PAIN: 0
HEADACHES: 0
NUMBNESS: 0
CHILLS: 0
SORE THROAT: 0
VERTIGO: 0
WEAKNESS: 0
ANOREXIA: 0
ARTHRALGIAS: 0
SWOLLEN GLANDS: 0

## 2025-04-09 ASSESSMENT — PAIN SCALES - GENERAL: PAINLEVEL_OUTOF10: 0-NO PAIN

## 2025-04-09 NOTE — PROGRESS NOTES
Patient ID: Ehsan Dias is a 79 y.o. male.    DIAGNOSIS     Malignant right pleural mesothelioma- epithelioid type.  Date of diagnosis: 4/2/2021 from VATS guided pleural mass biopsy the neoplasm shows solid and papillary growth patterns. IHC of  tumor  cells were POSITIVE for calretinin, WT-1, CK5/6 and D2-40 (weak, focal), and NEGATIVE for CEAm, TTF-1, B72.3 and MOC31,        STAGING     Initial T2N0M0  Progression in chest and peritoneum in January 2022        CURRENT SITES OF DISEASE     pleura, mediastinum, peritoneum        MOLECULAR GENOMICS     MUTYH 48.9%  BAP1 17.5%   Tumor Mutational [place] (TMB): 5.8 m/MB Microsatellite Instability (MSI) Status: Stable           SERUM TUMOR MARKER     Not applicable        PRIOR THERAPY     1- Pleurectomy May 7th , 2021   2- 02.23.2022 : Started on Ipilimumab and Nivolumab. Documented clinical and radiographic response.  Ipilimumab dropped and nivolumab alone was continued starting December 21, 2022 given toxicities of the combination.  Last treated February 2024, completed 2 years of treatment        CURRENT THERAPY     Observation        CURRENT ONCOLOGICAL PROBLEMS     1/ R sided pleuritic chest pain , Improving  2. weight loss- in history it seems like this is intentional (need additional data points in future follow ups) - Gaining weight post surgery  3. dry cough - Significantly improved  4- Polypoid lesion seen on CT scan of the chest within the distal esophagus, referred for endoscopy.,  September 2021  5-  ascites starting Jan 2022.  Paracentesis performed in February suggest malignant ascites from mesothelioma.  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  Seen by rheumatology August 2022, prescribed Celebrex. 1/18/23 - Prednisone taper 10 mg to current 7.5 mg.  Down to prednisone 5 mg with initiation of methotrexate  in July 2023.  Patient notified me on August 23, 2023 that he is not taking his methotrexate and he is fearful of the side effects.  Continues to have polyarthritis symptoms.  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  Due to see ENT.  14- Increasing left anterior sacroiliac left inguinal left hip and pelvic pain August and September 2024.  Ordered MRI of the pelvis.  Evidence of significant left hip arthritis.  Consider orthopedic referral.  Received hip injections fall 2024   15-recommended to patient to go back to see urology given elevated PSA January 2024.  Seen April 2025, no interventions        HISTORY OF PRESENT ILLNESS      His initial presentation was in July of 2018 when he presented with cc of  persistent fever, right sided pleuritic chest pain for a month. He was seen by Dr. Catie Harvey in Pulmonary clinic, routine imaging showed right side pleural effusion.  Diagnostic thoracentesis was performed on 7/13 that showed exudative effusion with neutrophilic predominance, cultures negative, cytology negative for carcinoma.   He completed course oral antibiotics and his pain and effusion resolved.  Again, had recurrence of pleuritic chest pain  and pleural effusion   later in 12/2020. The effusion was found to be worse on repeat chest imaging ( CT Chest in 2/2021). Diagnostic and therapeutic thoracentesis was done which showed exudative effusion with lymphocyte predominance.  Given his recurrent ipsilateral moderate  size pleural effusion and near complete collapse of the right lower lobe and mild right middle lobe atelectasis on CT chest- he was referred to CT surgeon and a PET CT was also obtained.      3/2021: PET CT revealed right-sided pleural effusion with multiple hypermetabolic  areas of pleural nodularity visualized along the pleural surface of the right lung, which predominantly involve the right lung base. In addition, there were multiple FDG avid mediastinal lymph nodes including enlarged subcarinal lymph nodes (maximum SUV  of 4.4), paratracheal lymph nodes (maximum SUV of 3.1), and right hilar  lymph nodes (maximum SUV of 2.9).     3/23/21: was seen by Dr. Dannielle Junior from CT surgery and was scheduled to get R VATS pleural biopsy on 4/2/21: thorascopic exploration showed some adhesions at the diaphragm to the chest wall and also  to the lung.        PAST MEDICAL HISTORY     CAD with MI s/p PCI to RCA with 2 stents,   OA R shoulder s/p replacement,   HTN   elevated PSA with FDG avidity in post lobe of prostate- followed by Dr. 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   former smoker, quit 1969, 2 packs x 8 year  = 16 pack years   smoked marijuana since 1970s; quit few months ago  Possible exposure to asbestos when worked in the [employer].          CURRENT MEDS     see med list         ALLERGIES     nkda         FAMILY HISTORY     no family h/o mesothelioma       Subjective    Cancer  Pertinent negatives include no abdominal pain, anorexia, arthralgias, change in bowel habit, chest pain, chills, congestion, coughing, diaphoresis, fatigue, fever, headaches, joint swelling, myalgias, nausea, neck pain, numbness, rash, sore throat, swollen glands, urinary symptoms, vertigo, visual change, vomiting or weakness.     Chronic hip pain    Objective    BSA: 2.02 meters  squared  /74 (BP Location: Right arm, Patient Position: Sitting, BP Cuff Size: Adult)   Pulse 77   Temp 36.2 °C (97.2 °F) (Temporal)   Resp 18   Wt 83.9 kg (184 lb 14.4 oz)   SpO2 96%   BMI 27.30 kg/m²      Physical Exam  Constitutional:       General: He is not in acute distress.     Appearance: Normal appearance. He is not ill-appearing, toxic-appearing or diaphoretic.   HENT:      Nose: No congestion or rhinorrhea.      Mouth/Throat:      Pharynx: No oropharyngeal exudate or posterior oropharyngeal erythema.   Eyes:      General: No scleral icterus.     Conjunctiva/sclera: Conjunctivae normal.   Cardiovascular:      Rate and Rhythm: Normal rate and regular rhythm.      Pulses: Normal pulses.      Heart sounds: Normal heart sounds. No murmur heard.     No friction rub. No gallop.   Pulmonary:      Effort: Pulmonary effort is normal. No respiratory distress.      Breath sounds: Normal breath sounds. No stridor. No wheezing, rhonchi or rales.   Chest:      Chest wall: No tenderness.   Abdominal:      General: Abdomen is flat. Bowel sounds are normal. There is no distension.      Palpations: Abdomen is soft. There is no mass.      Tenderness: There is no abdominal tenderness. There is no guarding or rebound.   Musculoskeletal:      Cervical back: No tenderness.      Right lower leg: No edema.      Left lower leg: No edema.   Lymphadenopathy:      Cervical: No cervical adenopathy.   Skin:     General: Skin is warm.      Coloration: Skin is not jaundiced.   Neurological:      General: No focal deficit present.      Mental Status: He is oriented to person, place, and time.   Psychiatric:         Mood and Affect: Mood normal.         Behavior: Behavior normal.         Performance Status:  Asymptomatic     Latest Reference Range & Units 04/01/25 11:25   GLUCOSE 74 - 99 mg/dL 84   SODIUM 136 - 145 mmol/L 138   POTASSIUM 3.5 - 5.3 mmol/L 4.3   CHLORIDE 98 - 107 mmol/L 98   Bicarbonate 21 - 32 mmol/L 32   Anion  Gap 10 - 20 mmol/L 12   Blood Urea Nitrogen 6 - 23 mg/dL 17   Creatinine 0.50 - 1.30 mg/dL 0.85   EGFR >60 mL/min/1.73m*2 88   Calcium 8.6 - 10.6 mg/dL 9.9   Albumin 3.4 - 5.0 g/dL 4.3   Alkaline Phosphatase 33 - 136 U/L 127   ALT 10 - 52 U/L 23   AST 9 - 39 U/L 22   Bilirubin Total 0.0 - 1.2 mg/dL 0.6   Total Protein 6.4 - 8.2 g/dL 7.4   Thyroid Stimulating Hormone 0.44 - 3.98 mIU/L 0.68   WBC 4.4 - 11.3 x10*3/uL 7.5   nRBC 0.0 - 0.0 /100 WBCs 0.0   RBC 4.50 - 5.90 x10*6/uL 4.86   HEMOGLOBIN 13.5 - 17.5 g/dL 14.8   HEMATOCRIT 41.0 - 52.0 % 46.0   MCV 80 - 100 fL 95   MCH 26.0 - 34.0 pg 30.5   MCHC 32.0 - 36.0 g/dL 32.2   RED CELL DISTRIBUTION WIDTH 11.5 - 14.5 % 14.4   Platelets 150 - 450 x10*3/uL 350   Neutrophils % 40.0 - 80.0 % 67.4   Immature Granulocytes %, Automated 0.0 - 0.9 % 0.4   Lymphocytes % 13.0 - 44.0 % 21.5   Monocytes % 2.0 - 10.0 % 8.4   Eosinophils % 0.0 - 6.0 % 1.5   Basophils % 0.0 - 2.0 % 0.8   Neutrophils Absolute 1.60 - 5.50 x10*3/uL 5.07   Immature Granulocytes Absolute, Automated 0.00 - 0.50 x10*3/uL 0.03   Lymphocytes Absolute 0.80 - 3.00 x10*3/uL 1.62   Monocytes Absolute 0.05 - 0.80 x10*3/uL 0.63   Eosinophils Absolute 0.00 - 0.40 x10*3/uL 0.11   Basophils Absolute 0.00 - 0.10 x10*3/uL 0.06       CT CHEST ABDOMEN PELVIS W IV CONTRAST; 4/4/2025   IMPRESSION:  Mesothelioma restaging scan. When compared to the prior examination  dated 01/06/2025, there are stable postsurgical changes of a right  pleurectomy without definite evidence of new metastatic disease.  Additional stable chronic and incidental findings described above.          Assessment/Plan     This is an 79-year-old gentleman with malignant pleural mesothelioma with involvement of the right pleura mediastinum and peritoneum.  He was treated with combination immunotherapy.  With complete response to treatment, last treatment February 2024.  He has no evidence of disease recurrence.      Cancer Staging   No matching staging  information was found for the patient.      Oncology History   Malignant neoplasm of mesothelial tissue (Multi)   2/27/2023 Initial Diagnosis    Malignant neoplasm of mesothelial tissue (CMS/HCC)     10/4/2023 - 2/7/2024 Chemotherapy    Nivolumab 240 mg (Biweekly), 28 Day Cycles                   Shawn Blum MD

## 2025-04-22 ENCOUNTER — OFFICE VISIT (OUTPATIENT)
Dept: UROLOGY | Facility: HOSPITAL | Age: 80
End: 2025-04-22
Payer: MEDICARE

## 2025-04-22 DIAGNOSIS — N52.9 ERECTILE DYSFUNCTION, UNSPECIFIED ERECTILE DYSFUNCTION TYPE: Primary | ICD-10-CM

## 2025-04-22 DIAGNOSIS — R68.82 LOW LIBIDO: ICD-10-CM

## 2025-04-22 DIAGNOSIS — E29.1 HYPOGONADISM IN MALE: ICD-10-CM

## 2025-04-22 PROCEDURE — 1123F ACP DISCUSS/DSCN MKR DOCD: CPT | Performed by: UROLOGY

## 2025-04-22 PROCEDURE — G2211 COMPLEX E/M VISIT ADD ON: HCPCS | Performed by: UROLOGY

## 2025-04-22 PROCEDURE — 1036F TOBACCO NON-USER: CPT | Performed by: UROLOGY

## 2025-04-22 PROCEDURE — 1157F ADVNC CARE PLAN IN RCRD: CPT | Performed by: UROLOGY

## 2025-04-22 PROCEDURE — 99204 OFFICE O/P NEW MOD 45 MIN: CPT | Performed by: UROLOGY

## 2025-04-22 PROCEDURE — 1159F MED LIST DOCD IN RCRD: CPT | Performed by: UROLOGY

## 2025-04-22 PROCEDURE — 99214 OFFICE O/P EST MOD 30 MIN: CPT | Performed by: UROLOGY

## 2025-04-22 RX ORDER — TRIAMCINOLONE ACETONIDE 1 MG/G
OINTMENT TOPICAL 2 TIMES DAILY
COMMUNITY
Start: 2025-04-16

## 2025-04-22 NOTE — PROGRESS NOTES
NAME:Ehsan Dias  DATE: 2025               Subjective:   Chief complaint: ED / low libido     HPI:  79 y.o. male presenting for evaluation of ED / low libido at the request of Dr. Zeb CARRANZA HPI:   Erectile Dysfunction:  Duration - 4 Years   Able to obtain - No  Able to maintain - No  Pain - No  Curvature - No N/A  Libido - Bad  Prior treatments - Tadalafil- prescribed but never took them. Due to heart concerns  Relationship status -   Sexual Partners - Female     Voiding Symptoms  Frequency: Q 3 hours   Urgency: No  Urge Incontinence: No  Nocturia: 0 times per night Sleep Disorder: No  Stream: strong  Hesitancy: No  Straining: No  Intermittency: No  Sensation of Incomplete Emptying: No  Stress Incontinence: No  Pads:  No 0per day     Dysuria:  No  Gross Hematuria:  No  UTI:  1 4-5 years back   Stones:  No    Consumes  oz of fluid per day.     Nomedications for his bladder in the past.    Nourodynamic testing in the past.     Bowel Function:   Pt has no constipation. BM's Q 1 days.     Medical History[1]  Surgical History[2]  Social History     Tobacco Use    Smoking status: Former     Current packs/day: 0.00     Types: Cigarettes     Quit date:      Years since quittin.3     Passive exposure: Past    Smokeless tobacco: Never   Substance Use Topics    Alcohol use: Not Currently     Comment: Quit 4 years ago (2025)     Family History[3]  [unfilled]  Medications Ordered Prior to Encounter[4]  Ehsan is allergic to doxycycline, hydroxychloroquine, clopidogrel, and dyclonine.     Review of Systems    14 point ROS reviewed and discussed with the patient. Pertinent positives/negatives discussed in the History of Present Illness (HPI).    FH:  Prostate CA: No  Bladder CA: No  Kidney CA: No  Stones: No    Social History  Occupation: retired   Tob: No  Etoh: No    Objective:     Physical Exam   1. Constitutional: NAD, Well-developed, Well-nourished  2. Respiratory: Unlabored  breathing, no audible wheezes, no use of accessory muscles   3. Cardiovascular: No JVD, extremities perfused, no edema  4. Abdomen: Soft, non-tender, non-distended, no masses or hernia.  No CVA tenderness.    5. Skin: no visible lesions, plaque, rashes, jaundice  6. Neuro: Gait normal, no focal neurologic deficit  7. Psychiatric: Mood and affect normal and appropriate, alert and oriented    Labs  Lab Results   Component Value Date    PSA 6.57 (H) 01/03/2025     Lab Results   Component Value Date    GFRMALE >90 09/20/2023     Lab Results   Component Value Date    CREATININE 0.85 04/01/2025     Lab Results   Component Value Date    CHOL 116 01/03/2025     Lab Results   Component Value Date    HDL 28.8 01/03/2025     Lab Results   Component Value Date    CHHDL 4.0 01/03/2025     Lab Results   Component Value Date    LDLF 39 01/28/2022     Lab Results   Component Value Date    VLDL 34 01/03/2025     Lab Results   Component Value Date    TRIG 170 (H) 01/03/2025     Lab Results   Component Value Date    TESTF 49.6 09/25/2024     Lab Results   Component Value Date    HCT 46.0 04/01/2025     Assessment/Plan:   Ehsan Dias presents with     1. Erectile dysfunction, unspecified erectile dysfunction type    2. Low libido      Will recheck AM testosterone    Testosterone replacement therapy  The patient's lab work and clinical symptoms suggest that he has hypogonadism or testosterone deficiency.  I had a long discussion with the patient about the treatment options of his condition. This included lifestyle modification as well as supplementation.   Exogenous testosterone as well as the use of selective estrogen receptor modulators (SERMs), aromatase inhibitors, human gonadotropins were discussed.  The several treatment options including various formulations were discussed. I explained the risks, benefits, mechanisms, and use of each therapy.  I described potential side effects of treatment. Common side effects include  acne, erythrocytosis, breast tenderness, and changes in behavior.  I highlighted the effect of exogenous testosterone on the reproductive system, including decrease in testicular volume and the negative affect on sperm production which may result in sterility, which could be permanent.    We discussed that current data suggest that external testosterone therapy does not cause an increase in the risk of developing prostate cancer, and can also be used safely in patients after prostate cancer treatment.  It may however cause an increase in prostate volume and PSA.  We discussed that the there is controversy in replacement therapies effect on the cardiovascular system.     We also discussed the precautions necessary to keep the medication from contacting others, especially women and children, for whom contact with the medication can be particularly harmful.   He will use soap and water to wash his hands after application and will clean his skin before having skin-to-skin contact with others if he chooses gel application delivery of testosterone.  A monitoring schedule was discussed with includes routine evaluation of serum testosterone, hematocrit, lipid panel, and PSA.  No barriers to learning were identified.  After all of the patient's questions were satisfactorily answered, he expressed understanding of the risks of therapy.             [1]   Past Medical History:  Diagnosis Date    Abdominal cramping 02/27/2023    Anemia due to blood loss 09/15/2023    Atherosclerosis of aorta (CMS-HCC) 10/10/2023    CAD (coronary artery disease) 02/27/2023    Inferior wall MI, s/p FROY x2 to RCA. 70% on revascularized LAD. 2001 [facility].    Depression 09/15/2023    Edema, lower extremity 09/15/2023    Gender identity uncertainty in adult 09/15/2023    Hypercholesterolemia 02/27/2023    Dr. 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    Other fatigue 09/15/2023    Personal history of diseases of the blood and blood-forming organs and certain disorders involving the immune mechanism 07/20/2017    History of thrombocytosis    Personal history of malignant neoplasm of soft tissue 11/18/2021    History of malignant mesothelioma    Personal history of other diseases of male genital organs 11/11/2019    History of impotence    Personal history of transient ischemic attack (TIA), and cerebral infarction without residual deficits 12/14/2021    History of transient ischemic attack    Pleural mass 02/27/2023    Recurrent major depressive disorder, in partial remission (CMS-HCC) 02/27/2023    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    Skin irritation 09/15/2023    SOB (shortness of breath) on exertion 09/15/2023    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    Transient ischemic attack 09/23/2024    Comment on above: 2005 Diplopia;      Viremia 09/15/2023    Xerosis cutis 11/15/2022   [2]   Past Surgical History:  Procedure Laterality Date    LUNG DECORTICATION Right     OTHER SURGICAL HISTORY  05/26/2021    Pleurectomy with decortication    OTHER SURGICAL HISTORY  05/26/2021    Cardiac catheterization with stent placement    OTHER SURGICAL HISTORY  05/26/2021    Thoracentesis    OTHER SURGICAL HISTORY  04/07/2021    Bronchoscopy    OTHER SURGICAL HISTORY  04/07/2021    Pleural biopsy    SHOULDER SURGERY Right 02/09/2015    Shoulder Surgery    US GUIDED ABDOMINAL PARACENTESIS  02/18/2022    US GUIDED ABDOMINAL PARACENTESIS 2/18/2022 [facility] [place] [facility]    US GUIDED ABDOMINAL PARACENTESIS  03/11/2022    US GUIDED ABDOMINAL PARACENTESIS 3/11/2022 [facility] [place] [facility]    US GUIDED ABDOMINAL PARACENTESIS  03/22/2022    US GUIDED ABDOMINAL PARACENTESIS 3/22/2022 [facility] [place] [facility]    US GUIDED ABDOMINAL  PARACENTESIS  04/01/2022    US GUIDED ABDOMINAL PARACENTESIS 4/1/2022 [facility] [place] [facility]   [3]   Family History  Problem Relation Name Age of Onset    Heart disease Mother      Other (cardiac disorder) Mother      Heart disease Father      Other (cardiac disorder) Father      Coronary artery disease Brother     [4]   Current Outpatient Medications on File Prior to Visit   Medication Sig Dispense Refill    triamcinolone (Kenalog) 0.1 % ointment Apply topically 2 times a day.      aspirin 81 mg EC tablet Take by mouth.      atorvastatin (Lipitor) 80 mg tablet Take 1 tablet (80 mg) by mouth once daily. 100 tablet 1    b complex 0.4 mg tablet Take 1 tablet by mouth once daily.      CASEIN-MILK,NFAT,SKIM-PALM OIL ORAL Take by mouth.      clobetasol (Temovate) 0.05 % external solution APPLY TWICE DAILY TO SCALP FOR 2 WEEKS, THEN AS NEEDED FOR FLARES      creatine, bulk, 100 % powder 5 g once daily.      dilTIAZem CD (Cardizem CD) 240 mg 24 hr capsule Take 1 capsule (240 mg) by mouth once daily. 100 capsule 1    FLAXSEED MM Use in the mouth or throat.      fluticasone (Cutivate) 0.05 % cream APPLY TO RASH ON EARS TWICE DAILY FOR 2 WEEKS, THEN AS NEEDED FOR FLARES      folic acid (Folvite) 1 mg tablet Take 1 tablet (1 mg) by mouth once daily. 90 tablet 1    hydroCHLOROthiazide (Microzide) 12.5 mg tablet Take 1 tablet (12.5 mg) by mouth once daily. 100 tablet 1    ketoconazole (NIZOral) 2 % shampoo WASH SCALP AND FACE 2-3X PER WEEK. LET SIT FOR 10 MINUTES, THEN RINSE.      levothyroxine (Synthroid, Levoxyl) 100 mcg tablet Take 1 tablet (100 mcg) by mouth once daily. 100 tablet 1    lisinopril 10 mg tablet Take 1 tablet (10 mg) by mouth once daily. 100 tablet 1    methotrexate (Trexall) 2.5 mg tablet 1 tablet (2.5 mg total).      metoprolol succinate XL (Toprol-XL) 100 mg 24 hr tablet Take 1 tablet (100 mg) by mouth 2 times a day. Do not crush or chew. 180 tablet 3    nitroglycerin (Nitrostat) 0.4 mg SL tablet Place 1 tablet (0.4  mg) under the tongue every 5 minutes if needed for chest pain. 12 tablet 1    whey prot/arg/glu/C/Zn//tos (WHEY PROT-ARG-GLUT-C-ZN-CU-TOS ORAL) Take by mouth.       No current facility-administered medications on file prior to visit.

## 2025-05-05 NOTE — PROGRESS NOTES
Subjective   Ehsan Dias  is a 79 y.o. male who presents for evaluation of mesothelioma status post pleurectomy decortication on 5/7/2021.      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 I felt he was an appropriate operative candidate and offered him pleurectomy decortication. This was performed 5/7/2021. Aside from some transient hypotension, the patient had an unremarkable postoperative course and was discharged home with home care. Since his surgery, he has received adjuvant chemoimmunotherapy.    Currently the patient is in their usual state of health. He denies the following symptoms: chest pain, shortness of breath at rest, shortness of breath with activity, cough, hemoptysis, fevers, chills, and weight loss.  He does NOT have pain after eating. He notes some pain when he bench presses (which is to say that he is quite active).     There have been the following significant changes to their documented medical, surgical and family history. New gallstones - possible need for cholecystectomy.      He  reports that he quit smoking about 56 years ago. His smoking use included cigarettes. He has been exposed to tobacco smoke. He has never used smokeless tobacco. He reports that he does not currently use alcohol. He reports that he does not currently use drugs.    Objective   Physical Exam  The patient is well-appearing and in no acute distress. The trachea is midline and there is no crepitus. The lungs were clear to auscultation grossly. There was good effort and excursion. The heart had a regular rate and rhythm. The abdomen was soft, nontender and nondistended. The extremities had no edema or gross deformities. Mood and affect are appropriate.  Diagnostic Studies  === 04/04/25 ===    CT CHEST ABDOMEN PELVIS W IV CONTRAST    - Impression -  Mesothelioma restaging scan. When compared to the prior examination  dated 01/06/2025, there are stable postsurgical  changes of a right  pleurectomy without definite evidence of new metastatic disease.  Additional stable chronic and incidental findings described above.    I personally reviewed the image(s) / study and I agree with the  findings as stated by Francisco Negro MD. This study was interpreted at  [facility], [place], [place].    MACRO:  None    Signed by: Jayesh Pyle 4/5/2025 1:35 PM  Dictation workstation:   [id number]    Assessment/Plan   I believe that the patient is doing well.     Based on the patient's clinical presentation and my review of their radiographic imaging, I believe they have no evidence of cancer recurrence.  I recommend ongoing radiographic screening.    I recommend radiographic surveillance by medical oncology with follow-up in parallel    I discussed this in detail with the patient, including a discussion of alternatives. They were comfortable with this approach.     Juan Michelle MD  [phone]

## 2025-05-13 ENCOUNTER — APPOINTMENT (OUTPATIENT)
Dept: PRIMARY CARE | Facility: CLINIC | Age: 80
End: 2025-05-13
Payer: MEDICARE

## 2025-05-13 VITALS
HEART RATE: 78 BPM | BODY MASS INDEX: 26.96 KG/M2 | OXYGEN SATURATION: 97 % | WEIGHT: 182 LBS | HEIGHT: 69 IN | DIASTOLIC BLOOD PRESSURE: 76 MMHG | RESPIRATION RATE: 12 BRPM | SYSTOLIC BLOOD PRESSURE: 124 MMHG

## 2025-05-13 DIAGNOSIS — M81.0 OSTEOPOROSIS, UNSPECIFIED OSTEOPOROSIS TYPE, UNSPECIFIED PATHOLOGICAL FRACTURE PRESENCE: ICD-10-CM

## 2025-05-13 DIAGNOSIS — L40.9 PSORIASIS: ICD-10-CM

## 2025-05-13 DIAGNOSIS — I70.0 ATHEROSCLEROSIS OF AORTA: Chronic | ICD-10-CM

## 2025-05-13 DIAGNOSIS — C45.9 MALIGNANT NEOPLASM OF MESOTHELIAL TISSUE (MULTI): Chronic | ICD-10-CM

## 2025-05-13 DIAGNOSIS — I25.10 CORONARY ARTERY DISEASE INVOLVING NATIVE CORONARY ARTERY OF NATIVE HEART WITHOUT ANGINA PECTORIS: ICD-10-CM

## 2025-05-13 DIAGNOSIS — M19.90 INFLAMMATORY ARTHRITIS: ICD-10-CM

## 2025-05-13 DIAGNOSIS — I10 PRIMARY HYPERTENSION: Chronic | ICD-10-CM

## 2025-05-13 DIAGNOSIS — I25.5 ISCHEMIC CARDIOMYOPATHY: Chronic | ICD-10-CM

## 2025-05-13 DIAGNOSIS — D13.5 ADENOMYOMA OF GALLBLADDER: ICD-10-CM

## 2025-05-13 DIAGNOSIS — E78.00 HYPERCHOLESTEROLEMIA: Chronic | ICD-10-CM

## 2025-05-13 DIAGNOSIS — C78.6 SECONDARY MALIGNANT NEOPLASM OF RETROPERITONEUM AND PERITONEUM (MULTI): Chronic | ICD-10-CM

## 2025-05-13 DIAGNOSIS — K80.20 CALCULUS OF GALLBLADDER WITHOUT CHOLECYSTITIS WITHOUT OBSTRUCTION: Primary | ICD-10-CM

## 2025-05-13 DIAGNOSIS — M06.9 RHEUMATOID ARTHRITIS IN REMISSION (MULTI): ICD-10-CM

## 2025-05-13 DIAGNOSIS — E03.9 HYPOTHYROIDISM, UNSPECIFIED TYPE: ICD-10-CM

## 2025-05-13 DIAGNOSIS — R10.9 RIGHT SIDED ABDOMINAL PAIN: ICD-10-CM

## 2025-05-13 PROCEDURE — 1036F TOBACCO NON-USER: CPT | Performed by: FAMILY MEDICINE

## 2025-05-13 PROCEDURE — 3074F SYST BP LT 130 MM HG: CPT | Performed by: FAMILY MEDICINE

## 2025-05-13 PROCEDURE — G2211 COMPLEX E/M VISIT ADD ON: HCPCS | Performed by: FAMILY MEDICINE

## 2025-05-13 PROCEDURE — 99214 OFFICE O/P EST MOD 30 MIN: CPT | Performed by: FAMILY MEDICINE

## 2025-05-13 PROCEDURE — 3078F DIAST BP <80 MM HG: CPT | Performed by: FAMILY MEDICINE

## 2025-05-13 PROCEDURE — 1159F MED LIST DOCD IN RCRD: CPT | Performed by: FAMILY MEDICINE

## 2025-05-13 RX ORDER — NITROGLYCERIN 0.4 MG/1
0.4 TABLET SUBLINGUAL EVERY 5 MIN PRN
Qty: 25 TABLET | Refills: 1 | Status: SHIPPED | OUTPATIENT
Start: 2025-05-13

## 2025-05-13 RX ORDER — HYDROCHLOROTHIAZIDE 12.5 MG/1
12.5 TABLET ORAL DAILY
Qty: 100 TABLET | Refills: 1 | Status: SHIPPED | OUTPATIENT
Start: 2025-05-13

## 2025-05-13 RX ORDER — LEVOTHYROXINE SODIUM 100 UG/1
100 TABLET ORAL DAILY
Qty: 100 TABLET | Refills: 1 | Status: SHIPPED | OUTPATIENT
Start: 2025-05-13

## 2025-05-13 RX ORDER — DILTIAZEM HYDROCHLORIDE 240 MG/1
240 CAPSULE, COATED, EXTENDED RELEASE ORAL DAILY
Qty: 100 CAPSULE | Refills: 1 | Status: SHIPPED | OUTPATIENT
Start: 2025-05-13

## 2025-05-13 RX ORDER — ATORVASTATIN CALCIUM 80 MG/1
80 TABLET, FILM COATED ORAL DAILY
Qty: 100 TABLET | Refills: 1 | Status: SHIPPED | OUTPATIENT
Start: 2025-05-13

## 2025-05-13 RX ORDER — LISINOPRIL 10 MG/1
10 TABLET ORAL DAILY
Qty: 100 TABLET | Refills: 1 | Status: SHIPPED | OUTPATIENT
Start: 2025-05-13

## 2025-05-13 ASSESSMENT — ENCOUNTER SYMPTOMS
COUGH: 0
BLOOD IN STOOL: 0
BACK PAIN: 1
NAUSEA: 0
ARTHRALGIAS: 1
DIFFICULTY URINATING: 0
CHILLS: 0
WEAKNESS: 0
CHEST TIGHTNESS: 0
ADENOPATHY: 0
EYE REDNESS: 0
APPETITE CHANGE: 0
FATIGUE: 0
BRUISES/BLEEDS EASILY: 0
ABDOMINAL PAIN: 1
ABDOMINAL DISTENTION: 0
DIZZINESS: 0
CONSTIPATION: 0
NERVOUS/ANXIOUS: 0
DYSURIA: 0
DYSPHORIC MOOD: 0
SHORTNESS OF BREATH: 0
NECK PAIN: 1
EYE PAIN: 0
HEADACHES: 0
SORE THROAT: 0
DIARRHEA: 0
FEVER: 0

## 2025-05-13 NOTE — PROGRESS NOTES
"Subjective   Patient ID: Ehsan Dias is a 79 y.o. male who presents for Follow-up.    HPI     Review of Systems    Objective   /76   Pulse 78   Resp 12   Ht 1.753 m (5' 9\")   Wt 82.6 kg (182 lb)   SpO2 97%   BMI 26.88 kg/m²     Physical Exam    Assessment/Plan          "

## 2025-05-13 NOTE — PROGRESS NOTES
"Subjective   Patient ID: Ehsan Dias is a 79 y.o. male who presents for Follow-up.  Pt brings up about 1 month of R sided, diffuse abdominal pain that comes and goes. Eating doesn't have much effect but it is \"uncomfortable\" to lie flat. Changing possible helps. Oncologist's CT showed gallstone and GB adenomyoma 6 weeks ago.    Pt has chronic CAD, Aortic atherosclerosis, CMP, and HTN. Sees Dr Ambrocio.  Pt is taking Diltiazem, hydrochlorothiazide, and Metoprolol. Tolerating well.  Exercising 3 days per week   Low sodium diet is not being followed.   Is not monitoring home blood pressures.   Denies HA, vision changes or CP.     Pt has Dyslipidemia.   Lipid panel showed LDL 53.  Currently taking Atorvastatin and is tolerating well without muscle pains or weakness.     BPH/ elevated PSA is well controlled with monitoring with Dr Carrington .  Pt has 0-1 x nightly nocturia. Urinary stream is good to fair and he denies difficulty starting urination or emptying bladder.     RA/polyarthritis is still bothersome. Taking methotrexate. Most of pain is in neck, back and L>R hip. Hip injx didn't help much. Recommendation is to see PMR, he still needs to schedule. He also has OP and is on Calcium and Vit D,until recently after he google searched OP and read he shouldn't take them.    Pt has chronic, stable hypothyroidism.   Taking synthroid  Last TSH was April and in good range.  Lipid panel is  well controlled.  Energy is fair. Pt denies significant weight gain, low mood, constipation, or skin changes.   Medication is tolerated well without anxiety, tremors, palpitations, involuntary weight loss, heat intolerance or diarrhea.     For mesothelioma/ retroperitoneal             Review of Systems   Constitutional:  Negative for appetite change, chills, fatigue and fever.   HENT:  Negative for congestion, hearing loss and sore throat.    Eyes:  Negative for pain, redness and visual disturbance.   Respiratory:  Negative for cough, " "chest tightness and shortness of breath.    Cardiovascular:  Negative for chest pain and leg swelling.   Gastrointestinal:  Positive for abdominal pain. Negative for abdominal distention, blood in stool, constipation, diarrhea and nausea.   Genitourinary:  Negative for difficulty urinating and dysuria.   Musculoskeletal:  Positive for arthralgias, back pain and neck pain.   Skin:  Negative for rash.   Neurological:  Negative for dizziness, weakness and headaches.   Hematological:  Negative for adenopathy. Does not bruise/bleed easily.   Psychiatric/Behavioral:  Negative for dysphoric mood. The patient is not nervous/anxious.        Objective   /76   Pulse 78   Resp 12   Ht 1.753 m (5' 9\")   Wt 82.6 kg (182 lb)   SpO2 97%   BMI 26.88 kg/m²    Physical Exam  Constitutional:       General: He is not in acute distress.     Appearance: Normal appearance.   Cardiovascular:      Rate and Rhythm: Normal rate and regular rhythm.      Heart sounds: Normal heart sounds. No murmur heard.  Pulmonary:      Effort: Pulmonary effort is normal.      Breath sounds: Normal breath sounds.   Abdominal:      General: Bowel sounds are normal. There is no distension.      Palpations: Abdomen is soft. There is no mass.      Tenderness: There is abdominal tenderness (mild epigastric). There is no guarding or rebound.      Hernia: No hernia is present.   Neurological:      Mental Status: He is alert.   Psychiatric:         Mood and Affect: Mood normal.         Judgment: Judgment normal.           Assessment/Plan   Diagnoses and all orders for this visit:  Calculus/adenomyoma of gallbladder/Right sided abdominal pain - previously asymptomatic, will refer for General surgery consult  Coronary artery disease/Atherosclerosis of aorta /Ischemic cardiomyopathy - asymptomatic, continue to monitor with Dr Ambrocio  Hypercholesterolemia - stable on Atorvastatin  Primary hypertension - well controlled with Diltiazem, hydrochlorothiazide and " Metoprolol  Hypothyroidism - stable on Synthroid, monitoring with routine labs  Rheumatoid arthritis/ Inflammatory arthritis/OP - joint/neck/back pain still bothersome, keep plan to see pain mngt. Recommend restarting Calcium and Vit D  Malignant neoplasm of mesothelial tissue - stable, montioring with oncology  Psoriasis - stable, per dermatology    Follow up 6 months, 30mins

## 2025-05-14 ENCOUNTER — OFFICE VISIT (OUTPATIENT)
Dept: SURGERY | Facility: CLINIC | Age: 80
End: 2025-05-14
Payer: MEDICARE

## 2025-05-14 VITALS
TEMPERATURE: 98.1 F | DIASTOLIC BLOOD PRESSURE: 72 MMHG | HEIGHT: 70 IN | OXYGEN SATURATION: 99 % | WEIGHT: 181.8 LBS | BODY MASS INDEX: 26.03 KG/M2 | SYSTOLIC BLOOD PRESSURE: 131 MMHG | HEART RATE: 74 BPM

## 2025-05-14 DIAGNOSIS — C45.9 MALIGNANT NEOPLASM OF MESOTHELIAL TISSUE (MULTI): Primary | ICD-10-CM

## 2025-05-14 PROCEDURE — 1036F TOBACCO NON-USER: CPT | Performed by: THORACIC SURGERY (CARDIOTHORACIC VASCULAR SURGERY)

## 2025-05-14 PROCEDURE — 1126F AMNT PAIN NOTED NONE PRSNT: CPT | Performed by: THORACIC SURGERY (CARDIOTHORACIC VASCULAR SURGERY)

## 2025-05-14 PROCEDURE — 99214 OFFICE O/P EST MOD 30 MIN: CPT | Performed by: THORACIC SURGERY (CARDIOTHORACIC VASCULAR SURGERY)

## 2025-05-14 PROCEDURE — 3075F SYST BP GE 130 - 139MM HG: CPT | Performed by: THORACIC SURGERY (CARDIOTHORACIC VASCULAR SURGERY)

## 2025-05-14 PROCEDURE — 3078F DIAST BP <80 MM HG: CPT | Performed by: THORACIC SURGERY (CARDIOTHORACIC VASCULAR SURGERY)

## 2025-05-14 PROCEDURE — 1159F MED LIST DOCD IN RCRD: CPT | Performed by: THORACIC SURGERY (CARDIOTHORACIC VASCULAR SURGERY)

## 2025-05-14 ASSESSMENT — PAIN SCALES - GENERAL: PAINLEVEL_OUTOF10: 0-NO PAIN

## 2025-05-14 ASSESSMENT — ENCOUNTER SYMPTOMS
LOSS OF SENSATION IN FEET: 1
OCCASIONAL FEELINGS OF UNSTEADINESS: 0
DEPRESSION: 0

## 2025-06-17 ENCOUNTER — APPOINTMENT (OUTPATIENT)
Dept: SURGERY | Facility: HOSPITAL | Age: 80
End: 2025-06-17
Payer: MEDICARE

## 2025-06-22 DIAGNOSIS — M06.4 INFLAMMATORY POLYARTHROPATHY (MULTI): ICD-10-CM

## 2025-06-23 RX ORDER — METHOTREXATE 2.5 MG/1
TABLET ORAL
Qty: 100 TABLET | Refills: 1 | Status: SHIPPED | OUTPATIENT
Start: 2025-06-23 | End: 2025-09-21

## 2025-06-30 ENCOUNTER — TELEPHONE (OUTPATIENT)
Dept: CARDIOLOGY | Facility: CLINIC | Age: 80
End: 2025-06-30
Payer: MEDICARE

## 2025-06-30 DIAGNOSIS — I10 PRIMARY HYPERTENSION: Chronic | ICD-10-CM

## 2025-06-30 NOTE — TELEPHONE ENCOUNTER
OV 10/15/24  4. Tachycardia.  Heart rates are pretty well-controlled.  I will consolidate his metoprolol succinate which is 750 twice daily to 100 mg every afternoon.  He will continue diltiazem to 40 2 AM.     Metoprolol Succ bid sent in error as patient was on Metoprolol Succ 50mg bid and dose streamlined to 100mg daily.    Spoke with patient and instructed on above- at last OV metoprolol changed to daily dosing. Incorrect rx sent for twice daily. All questions/concerns addressed.

## 2025-06-30 NOTE — TELEPHONE ENCOUNTER
PT called stating his metoprolol succinate XL (Toprol-XL) 100 mg 24 hr tablet was prescribed wrong, he stated Dr Ambrocio filled the wrong dosage and would like to know of he can send over the 50mg instead

## 2025-07-02 RX ORDER — METOPROLOL SUCCINATE 100 MG/1
100 TABLET, EXTENDED RELEASE ORAL DAILY
Qty: 90 TABLET | Refills: 3 | Status: SHIPPED | OUTPATIENT
Start: 2025-07-02 | End: 2026-07-02

## 2025-07-12 DIAGNOSIS — M19.90 UNSPECIFIED OSTEOARTHRITIS, UNSPECIFIED SITE: ICD-10-CM

## 2025-07-14 RX ORDER — FOLIC ACID 1 MG/1
1 TABLET ORAL DAILY
Qty: 90 TABLET | Refills: 1 | Status: SHIPPED | OUTPATIENT
Start: 2025-07-14

## 2025-07-29 ENCOUNTER — LAB (OUTPATIENT)
Dept: LAB | Facility: HOSPITAL | Age: 80
End: 2025-07-29
Payer: MEDICARE

## 2025-07-29 DIAGNOSIS — R93.89 ABNORMAL FINDINGS ON DIAGNOSTIC IMAGING OF OTHER SPECIFIED BODY STRUCTURES: ICD-10-CM

## 2025-07-29 DIAGNOSIS — C45.9 MALIGNANT NEOPLASM OF MESOTHELIAL TISSUE (MULTI): ICD-10-CM

## 2025-07-29 DIAGNOSIS — C45.9 MESOTHELIOMA, UNSPECIFIED: Primary | ICD-10-CM

## 2025-07-29 LAB
ALBUMIN SERPL BCP-MCNC: 4.4 G/DL (ref 3.4–5)
ALP SERPL-CCNC: 114 U/L (ref 33–136)
ALT SERPL W P-5'-P-CCNC: 35 U/L (ref 10–52)
ANION GAP SERPL CALC-SCNC: 12 MMOL/L (ref 10–20)
AST SERPL W P-5'-P-CCNC: 24 U/L (ref 9–39)
BASOPHILS # BLD AUTO: 0.06 X10*3/UL (ref 0–0.1)
BASOPHILS NFR BLD AUTO: 1.1 %
BILIRUB SERPL-MCNC: 0.6 MG/DL (ref 0–1.2)
BUN SERPL-MCNC: 18 MG/DL (ref 6–23)
CALCIUM SERPL-MCNC: 9.8 MG/DL (ref 8.6–10.6)
CHLORIDE SERPL-SCNC: 101 MMOL/L (ref 98–107)
CO2 SERPL-SCNC: 32 MMOL/L (ref 21–32)
CREAT SERPL-MCNC: 0.7 MG/DL (ref 0.5–1.3)
EGFRCR SERPLBLD CKD-EPI 2021: >90 ML/MIN/1.73M*2
EOSINOPHIL # BLD AUTO: 0.2 X10*3/UL (ref 0–0.4)
EOSINOPHIL NFR BLD AUTO: 3.6 %
ERYTHROCYTE [DISTWIDTH] IN BLOOD BY AUTOMATED COUNT: 14.8 % (ref 11.5–14.5)
GLUCOSE SERPL-MCNC: 85 MG/DL (ref 74–99)
HCT VFR BLD AUTO: 45.7 % (ref 41–52)
HGB BLD-MCNC: 14 G/DL (ref 13.5–17.5)
IMM GRANULOCYTES # BLD AUTO: 0.02 X10*3/UL (ref 0–0.5)
IMM GRANULOCYTES NFR BLD AUTO: 0.4 % (ref 0–0.9)
LYMPHOCYTES # BLD AUTO: 1.1 X10*3/UL (ref 0.8–3)
LYMPHOCYTES NFR BLD AUTO: 19.6 %
MCH RBC QN AUTO: 31.4 PG (ref 26–34)
MCHC RBC AUTO-ENTMCNC: 30.6 G/DL (ref 32–36)
MCV RBC AUTO: 103 FL (ref 80–100)
MONOCYTES # BLD AUTO: 0.47 X10*3/UL (ref 0.05–0.8)
MONOCYTES NFR BLD AUTO: 8.4 %
NEUTROPHILS # BLD AUTO: 3.76 X10*3/UL (ref 1.6–5.5)
NEUTROPHILS NFR BLD AUTO: 66.9 %
NRBC BLD-RTO: 0 /100 WBCS (ref 0–0)
PLATELET # BLD AUTO: 314 X10*3/UL (ref 150–450)
POTASSIUM SERPL-SCNC: 4 MMOL/L (ref 3.5–5.3)
PROT SERPL-MCNC: 6.9 G/DL (ref 6.4–8.2)
RBC # BLD AUTO: 4.46 X10*6/UL (ref 4.5–5.9)
SODIUM SERPL-SCNC: 141 MMOL/L (ref 136–145)
TSH SERPL-ACNC: 1.57 MIU/L (ref 0.44–3.98)
WBC # BLD AUTO: 5.6 X10*3/UL (ref 4.4–11.3)

## 2025-07-29 PROCEDURE — 85025 COMPLETE CBC W/AUTO DIFF WBC: CPT

## 2025-07-29 PROCEDURE — 36415 COLL VENOUS BLD VENIPUNCTURE: CPT

## 2025-07-29 PROCEDURE — 80053 COMPREHEN METABOLIC PANEL: CPT

## 2025-07-29 PROCEDURE — 84443 ASSAY THYROID STIM HORMONE: CPT

## 2025-07-30 LAB
ESTRADIOL SERPL-MCNC: 26 PG/ML
LH SERPL-ACNC: 9.9 MIU/ML (ref 1.6–15.2)
TESTOST SERPL-MCNC: 338 NG/DL (ref 250–827)

## 2025-07-31 ENCOUNTER — HOSPITAL ENCOUNTER (OUTPATIENT)
Dept: RADIOLOGY | Facility: CLINIC | Age: 80
Discharge: HOME | End: 2025-07-31
Payer: MEDICARE

## 2025-07-31 DIAGNOSIS — C45.9 MALIGNANT NEOPLASM OF MESOTHELIAL TISSUE (MULTI): ICD-10-CM

## 2025-07-31 PROCEDURE — 2550000001 HC RX 255 CONTRASTS: Mod: JW | Performed by: INTERNAL MEDICINE

## 2025-07-31 PROCEDURE — 71260 CT THORAX DX C+: CPT

## 2025-07-31 RX ADMIN — IOHEXOL 68 ML: 350 INJECTION, SOLUTION INTRAVENOUS at 10:08

## 2025-08-01 ENCOUNTER — TELEPHONE (OUTPATIENT)
Dept: PRIMARY CARE | Facility: CLINIC | Age: 80
End: 2025-08-01
Payer: MEDICARE

## 2025-08-05 ENCOUNTER — TELEMEDICINE (OUTPATIENT)
Dept: UROLOGY | Facility: HOSPITAL | Age: 80
End: 2025-08-05
Payer: MEDICARE

## 2025-08-05 DIAGNOSIS — E29.1 HYPOGONADISM IN MALE: ICD-10-CM

## 2025-08-05 DIAGNOSIS — N52.9 MALE ERECTILE DISORDER: ICD-10-CM

## 2025-08-05 DIAGNOSIS — Z12.5 PROSTATE CANCER SCREENING: ICD-10-CM

## 2025-08-05 DIAGNOSIS — N52.9 ERECTILE DISORDER: ICD-10-CM

## 2025-08-05 DIAGNOSIS — R68.82 LOW LIBIDO: Primary | ICD-10-CM

## 2025-08-05 PROCEDURE — G2211 COMPLEX E/M VISIT ADD ON: HCPCS | Performed by: UROLOGY

## 2025-08-05 PROCEDURE — 99214 OFFICE O/P EST MOD 30 MIN: CPT | Performed by: UROLOGY

## 2025-08-05 RX ORDER — TESTOSTERONE 20.25 MG/1.25G
GEL TOPICAL
Qty: 150 G | Refills: 5 | Status: SHIPPED | OUTPATIENT
Start: 2025-08-05

## 2025-08-05 RX ORDER — TADALAFIL 20 MG/1
20 TABLET ORAL DAILY PRN
Qty: 30 TABLET | Refills: 2 | Status: SHIPPED | OUTPATIENT
Start: 2025-08-05 | End: 2025-11-03

## 2025-08-05 NOTE — PROGRESS NOTES
NAME:Ehsan Dias  DATE: 2025    Last seen 25               Subjective:   FUV    HPI:  79 y.o. male presenting for evaluation of ED / low libido at the request of Dr. Carrington     Initial visit 25     Erectile Dysfunction:  Duration - 4 Years   Able to obtain - No  Able to maintain - No  Pain - No  Curvature - No N/A  Libido - Bad  Prior treatments - Tadalafil- prescribed but never took them. Due to heart concerns  Relationship status -   Sexual Partners - Female     Voiding Symptoms  Frequency: Q 3 hours   Urgency: No  Urge Incontinence: No  Nocturia: 0 times per night Sleep Disorder: No  Stream: strong  Hesitancy: No  Straining: No  Intermittency: No  Sensation of Incomplete Emptying: No  Stress Incontinence: No  Pads:  No 0per day     Dysuria:  No  Gross Hematuria:  No  UTI:  1 4-5 years back   Stones:  No    Consumes  oz of fluid per day.     Nomedications for his bladder in the past.    Nourodynamic testing in the past.     Bowel Function:   Pt has no constipation. BM's Q 1 days.     Medical History[1]  Surgical History[2]  Social History     Tobacco Use    Smoking status: Former     Current packs/day: 0.00     Types: Cigarettes     Quit date: 1969     Years since quittin.6     Passive exposure: Past    Smokeless tobacco: Never   Substance Use Topics    Alcohol use: Not Currently     Comment: Quit 4 years ago (2025)     Family History[3]  [unfilled]  Medications Ordered Prior to Encounter[4]  Ehsan is allergic to doxycycline, hydroxychloroquine, clopidogrel, and dyclonine.     Review of Systems    14 point ROS reviewed and discussed with the patient. Pertinent positives/negatives discussed in the History of Present Illness (HPI).    FH:  Prostate CA: No  Bladder CA: No  Kidney CA: No  Stones: No    Social History  Occupation: retired   Tob: No  Etoh: No    Objective:     Physical Exam   1. Constitutional: NAD, Well-developed, Well-nourished  2. Respiratory:  Unlabored breathing, no audible wheezes, no use of accessory muscles   3. Cardiovascular: No JVD, extremities perfused, no edema  4. Abdomen: Soft, non-tender, non-distended, no masses or hernia.  No CVA tenderness.    5. Skin: no visible lesions, plaque, rashes, jaundice  6. Neuro: Gait normal, no focal neurologic deficit  7. Psychiatric: Mood and affect normal and appropriate, alert and oriented    Labs    E 26  LH 9.9    Assessment/Plan:   Ehsan Dias presents with     1. Low libido    2. Male erectile disorder      T levels in normal range. Does have symptoms of low t - low libido, poor results in gym, low bone density, irritable  Want to try t gel, understand risk. Will start androgel 2 pumps per day    Labs and fu in     Erectile Dysfunction  Patient was seen today with the complaint of erectile dysfunction (ED).  I went over the definition of ED, which is the inability to obtain or maintain an erection sufficient for satisfactory sexual activity. I outlined that erectile function is a complex interplay of neural, vascular, hormonal and psychological factors.  Disruption in any of these pathways may lead to ED.    We discussed the role of Penile Doppler.  It involves an injection of a vasodilator (Trimix) to induce an erection, then using an ultrasound probe we assess the blood flow to the penis, how well the penis traps the blood (venous leak), as well as the presence of plaques, calcifications, or curvature of the penis. He is to consider this option.    In terms of treatment options; PDE5i (Viagra, Cialis, etc.), intracavernosal injections (ICI), vacuum erection devices (TOM) and penile implants were discussed in detail.      He would like to proceed with tadalfil 20 mg prn    Testosterone replacement therapy  The patient's lab work and clinical symptoms suggest that he has hypogonadism or testosterone deficiency.  I had a long discussion with the patient about the treatment options of his  condition. This included lifestyle modification as well as supplementation.   Exogenous testosterone as well as the use of selective estrogen receptor modulators (SERMs), aromatase inhibitors, human gonadotropins were discussed.  The several treatment options including various formulations were discussed. I explained the risks, benefits, mechanisms, and use of each therapy.  I described potential side effects of treatment. Common side effects include acne, erythrocytosis, breast tenderness, and changes in behavior.  I highlighted the effect of exogenous testosterone on the reproductive system, including decrease in testicular volume and the negative affect on sperm production which may result in sterility, which could be permanent.    We discussed that current data suggest that external testosterone therapy does not cause an increase in the risk of developing prostate cancer, and can also be used safely in patients after prostate cancer treatment.  It may however cause an increase in prostate volume and PSA.  We discussed that the there is controversy in replacement therapies effect on the cardiovascular system.     We also discussed the precautions necessary to keep the medication from contacting others, especially women and children, for whom contact with the medication can be particularly harmful.   He will use soap and water to wash his hands after application and will clean his skin before having skin-to-skin contact with others if he chooses gel application delivery of testosterone.  A monitoring schedule was discussed with includes routine evaluation of serum testosterone, hematocrit, lipid panel, and PSA.  No barriers to learning were identified.  After all of the patient's questions were satisfactorily answered, he expressed understanding of the risks of therapy.    I have personally reviewed the OARRS report for this patient. This report is scanned into the electronic medical record. I have considered  the  risks of abuse, dependence, addiction and diversion.    Controlled substance agreement was completed in the office.        [1]   Past Medical History:  Diagnosis Date    Abdominal cramping 02/27/2023    Anemia due to blood loss 09/15/2023    Atherosclerosis of aorta 10/10/2023    CAD (coronary artery disease) 02/27/2023    Inferior wall MI, s/p FROY x2 to RCA. 70% on revascularized LAD. 2001 [facility].    Depression 09/15/2023    Edema, lower extremity 09/15/2023    Gender identity uncertainty in adult 09/15/2023    Hypercholesterolemia 02/27/2023    Dr. 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    Other fatigue 09/15/2023    Personal history of diseases of the blood and blood-forming organs and certain disorders involving the immune mechanism 07/20/2017    History of thrombocytosis    Personal history of malignant neoplasm of soft tissue 11/18/2021    History of malignant mesothelioma    Personal history of other diseases of male genital organs 11/11/2019    History of impotence    Personal history of transient ischemic attack (TIA), and cerebral infarction without residual deficits 12/14/2021    History of transient ischemic attack    Pleural mass 02/27/2023    Recurrent major depressive disorder, in partial remission 02/27/2023    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    Skin irritation 09/15/2023    SOB (shortness of breath) on exertion 09/15/2023    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    Transient ischemic attack 09/23/2024    Comment on above: 2005 Diplopia;      Viremia 09/15/2023    Xerosis cutis 11/15/2022   [2]   Past Surgical History:  Procedure Laterality Date    LUNG DECORTICATION Right     OTHER SURGICAL HISTORY  05/26/2021     Pleurectomy with decortication    OTHER SURGICAL HISTORY  05/26/2021    Cardiac catheterization with stent placement    OTHER SURGICAL HISTORY  05/26/2021    Thoracentesis    OTHER SURGICAL HISTORY  04/07/2021    Bronchoscopy    OTHER SURGICAL HISTORY  04/07/2021    Pleural biopsy    SHOULDER SURGERY Right 02/09/2015    Shoulder Surgery    US GUIDED ABDOMINAL PARACENTESIS  02/18/2022    US GUIDED ABDOMINAL PARACENTESIS 2/18/2022 [facility] [place] [facility]    US GUIDED ABDOMINAL PARACENTESIS  03/11/2022    US GUIDED ABDOMINAL PARACENTESIS 3/11/2022 [facility] [place] [facility]    US GUIDED ABDOMINAL PARACENTESIS  03/22/2022    US GUIDED ABDOMINAL PARACENTESIS 3/22/2022 [facility] [place] [facility]    US GUIDED ABDOMINAL PARACENTESIS  04/01/2022    US GUIDED ABDOMINAL PARACENTESIS 4/1/2022 [facility] [place] [facility]   [3]   Family History  Problem Relation Name Age of Onset    Heart disease Mother      Other (cardiac disorder) Mother      Heart disease Father      Other (cardiac disorder) Father      Coronary artery disease Brother     [4]   Current Outpatient Medications on File Prior to Visit   Medication Sig Dispense Refill    aspirin 81 mg EC tablet Take by mouth.      atorvastatin (Lipitor) 80 mg tablet Take 1 tablet (80 mg) by mouth once daily. 100 tablet 1    b complex 0.4 mg tablet Take 1 tablet by mouth once daily.      CASEIN-MILK,NFAT,SKIM-PALM OIL ORAL Take by mouth.      clobetasol (Temovate) 0.05 % external solution APPLY TWICE DAILY TO SCALP FOR 2 WEEKS, THEN AS NEEDED FOR FLARES      creatine, bulk, 100 % powder 5 g once daily.      dilTIAZem CD (Cardizem CD) 240 mg 24 hr capsule Take 1 capsule (240 mg) by mouth once daily. 100 capsule 1    FLAXSEED MM Use in the mouth or throat.      fluticasone (Cutivate) 0.05 % cream APPLY TO RASH ON EARS TWICE DAILY FOR 2 WEEKS, THEN AS NEEDED FOR FLARES      folic acid (Folvite) 1 mg tablet TAKE 1 TABLET BY MOUTH ONCE DAILY. 90 tablet 1    hydroCHLOROthiazide (Microzide) 12.5 mg tablet Take 1 tablet (12.5 mg) by  mouth once daily. 100 tablet 1    ketoconazole (NIZOral) 2 % shampoo WASH SCALP AND FACE 2-3X PER WEEK. LET SIT FOR 10 MINUTES, THEN RINSE.      levothyroxine (Synthroid, Levoxyl) 100 mcg tablet Take 1 tablet (100 mcg) by mouth once daily. 100 tablet 1    lisinopril 10 mg tablet Take 1 tablet (10 mg) by mouth once daily. 100 tablet 1    methotrexate (Trexall) 2.5 mg tablet TAKE 10 TABLETS (25 MG TOTAL) BY MOUTH 1 (ONE) TIME PER WEEK. FOLLOW DIRECTIONS CAREFULLY, AND ASK TO EXPLAIN ANY PART YOU DO NOT UNDERSTAND. TAKE EXACTLY AS DIRECTED. 100 tablet 1    metoprolol succinate XL (Toprol-XL) 100 mg 24 hr tablet Take 1 tablet (100 mg) by mouth once daily. Do not crush or chew. 90 tablet 3    nitroglycerin (Nitrostat) 0.4 mg SL tablet Place 1 tablet (0.4 mg) under the tongue every 5 minutes if needed for chest pain. 25 tablet 1    triamcinolone (Kenalog) 0.1 % ointment Apply topically 2 times a day.      whey prot/arg/glu/C/Zn//tos (WHEY PROT-ARG-GLUT-C-ZN-CU-TOS ORAL) Take by mouth.       No current facility-administered medications on file prior to visit.

## 2025-08-06 ENCOUNTER — OFFICE VISIT (OUTPATIENT)
Dept: HEMATOLOGY/ONCOLOGY | Facility: HOSPITAL | Age: 80
End: 2025-08-06
Payer: MEDICARE

## 2025-08-06 VITALS
TEMPERATURE: 97.7 F | HEART RATE: 73 BPM | OXYGEN SATURATION: 99 % | BODY MASS INDEX: 26.39 KG/M2 | WEIGHT: 183.9 LBS | SYSTOLIC BLOOD PRESSURE: 138 MMHG | DIASTOLIC BLOOD PRESSURE: 69 MMHG

## 2025-08-06 DIAGNOSIS — E03.9 HYPOTHYROIDISM, UNSPECIFIED TYPE: ICD-10-CM

## 2025-08-06 DIAGNOSIS — C45.9 MALIGNANT NEOPLASM OF MESOTHELIAL TISSUE (MULTI): ICD-10-CM

## 2025-08-06 PROCEDURE — 1159F MED LIST DOCD IN RCRD: CPT | Performed by: INTERNAL MEDICINE

## 2025-08-06 PROCEDURE — 1126F AMNT PAIN NOTED NONE PRSNT: CPT | Performed by: INTERNAL MEDICINE

## 2025-08-06 PROCEDURE — 99214 OFFICE O/P EST MOD 30 MIN: CPT | Performed by: INTERNAL MEDICINE

## 2025-08-06 PROCEDURE — 3075F SYST BP GE 130 - 139MM HG: CPT | Performed by: INTERNAL MEDICINE

## 2025-08-06 PROCEDURE — 3078F DIAST BP <80 MM HG: CPT | Performed by: INTERNAL MEDICINE

## 2025-08-06 ASSESSMENT — ENCOUNTER SYMPTOMS
NUMBNESS: 0
WEAKNESS: 0
MYALGIAS: 0
VOMITING: 0
SORE THROAT: 0
COUGH: 0
NAUSEA: 0
JOINT SWELLING: 0
FEVER: 0
HEADACHES: 0
ABDOMINAL PAIN: 0
VERTIGO: 0
SWOLLEN GLANDS: 0
ANOREXIA: 0
VISUAL CHANGE: 0
ARTHRALGIAS: 0
NECK PAIN: 0
DIAPHORESIS: 0
FATIGUE: 0
CHANGE IN BOWEL HABIT: 0
CHILLS: 0

## 2025-08-06 ASSESSMENT — PAIN SCALES - GENERAL: PAINLEVEL_OUTOF10: 0-NO PAIN

## 2025-08-06 NOTE — PROGRESS NOTES
Patient ID: Ehsan Dias is a 79 y.o. male.    DIAGNOSIS     Right pleural mesothelioma- epithelioid type.  Date of diagnosis: 4/2/2021 from VATS guided pleural mass biopsy the neoplasm shows solid and papillary growth patterns. IHC of  tumor  cells were POSITIVE for calretinin, WT-1, CK5/6 and D2-40 (weak, focal), and NEGATIVE for CEAm, TTF-1, B72.3 and MOC31,        STAGING     Initial T2N0M0  Progression in chest and peritoneum in January 2022        CURRENT SITES OF DISEASE     pleura, mediastinum, peritoneum        MOLECULAR GENOMICS     MUTYH 48.9%  BAP1 17.5%   Tumor Mutational [place] (TMB): 5.8 m/MB Microsatellite Instability (MSI) Status: Stable        SERUM TUMOR MARKER     Not applicable        PRIOR THERAPY     1- Pleurectomy May 7th , 2021   2- 02.23.2022 : Started on Ipilimumab and Nivolumab. Documented clinical and radiographic response.  Ipilimumab dropped and nivolumab alone was continued starting December 21, 2022 given toxicities of the combination.  Last treated February 2024, completed 2 years of treatment        CURRENT THERAPY     Observation        CURRENT ONCOLOGICAL PROBLEMS     1/ R sided pleuritic chest pain , Improving  2. weight loss- in history it seems like this is intentional (need additional data points in future follow ups) - Gaining weight post surgery  3. dry cough - Significantly improved  4- Polypoid lesion seen on CT scan of the chest within the distal esophagus, referred for endoscopy.,  September 2021  5-  ascites starting Jan 2022.  Paracentesis performed in February suggest malignant ascites from mesothelioma.  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  Seen by rheumatology August 2022, prescribed Celebrex. 1/18/23 - Prednisone taper 10 mg to current 7.5 mg.  Down to prednisone 5 mg with initiation of methotrexate  in July 2023.  Patient notified me on August 23, 2023 that he is not taking his methotrexate and he is fearful of the side effects.  Continues to have polyarthritis symptoms.  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  Due to see ENT.  14- Increasing left anterior sacroiliac left inguinal left hip and pelvic pain August and September 2024.  Ordered MRI of the pelvis.  Evidence of significant left hip arthritis.  Consider orthopedic referral.  Received hip injections fall 2024.    15-recommended to patient to go back to see urology given elevated PSA January 2024.  Seen April 2025, no interventions        HISTORY OF PRESENT ILLNESS      His initial presentation was in July of 2018 when he presented with cc of  persistent fever, right sided pleuritic chest pain for a month. He was seen by Dr. Catie Harvey in Pulmonary clinic, routine imaging showed right side pleural effusion.  Diagnostic thoracentesis was performed on 7/13 that showed exudative effusion with neutrophilic predominance, cultures negative, cytology negative for carcinoma.   He completed course oral antibiotics and his pain and effusion resolved.  Again, had recurrence of pleuritic chest pain  and pleural effusion   later in 12/2020. The effusion was found to be worse on repeat chest imaging ( CT Chest in 2/2021). Diagnostic and therapeutic thoracentesis was done which showed exudative effusion with lymphocyte predominance. Given his  recurrent ipsilateral moderate  size pleural effusion and near complete collapse of the right lower lobe and mild right middle lobe atelectasis on CT chest- he was referred to CT surgeon and a PET CT was also obtained.      3/2021: PET CT revealed right-sided pleural effusion with multiple hypermetabolic  areas of pleural nodularity visualized along the pleural surface of the right lung, which predominantly involve the right lung base. In addition, there were multiple FDG avid mediastinal lymph nodes including enlarged subcarinal lymph nodes (maximum SUV  of 4.4), paratracheal lymph nodes (maximum SUV of 3.1), and right hilar  lymph nodes (maximum SUV of 2.9).     3/23/21: was seen by Dr. Dannielle Junior from CT surgery and was scheduled to get R VATS pleural biopsy on 4/2/21: thorascopic exploration showed some adhesions at the diaphragm to the chest wall and also  to the lung.        PAST MEDICAL HISTORY     CAD with MI s/p PCI to RCA with 2 stents,   OA R shoulder s/p replacement,   HTN   elevated PSA with FDG avidity in post lobe of prostate- followed by Dr. 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   former smoker, quit 1969, 2 packs x 8 year  = 16 pack years   smoked marijuana since 1970s; quit few months before dx  Possible exposure to asbestos when worked in the [employer].          CURRENT MEDS     see med list         ALLERGIES     nkda         FAMILY HISTORY     no family h/o mesothelioma           Subjective    Cancer  Pertinent negatives include no abdominal pain, anorexia, arthralgias, change in bowel habit, chest pain, chills, congestion, coughing, diaphoresis, fatigue, fever, headaches, joint swelling, myalgias, nausea, neck pain, numbness, rash, sore throat, swollen glands, urinary symptoms, vertigo, visual change, vomiting or weakness.     Overall patient doing well, continues to have  occasional left hip pain, occasional arthritis symptoms that are well-known to him, otherwise fully functional.    Objective    BSA: There is no height or weight on file to calculate BSA.  There were no vitals taken for this visit.     Physical Exam  Constitutional:       General: He is not in acute distress.     Appearance: Normal appearance. He is not ill-appearing, toxic-appearing or diaphoretic.   HENT:      Nose: No congestion or rhinorrhea.      Mouth/Throat:      Pharynx: No oropharyngeal exudate or posterior oropharyngeal erythema.     Eyes:      General: No scleral icterus.     Conjunctiva/sclera: Conjunctivae normal.       Cardiovascular:      Rate and Rhythm: Normal rate and regular rhythm.      Pulses: Normal pulses.      Heart sounds: Normal heart sounds. No murmur heard.     No friction rub. No gallop.   Pulmonary:      Effort: Pulmonary effort is normal. No respiratory distress.      Breath sounds: Normal breath sounds. No stridor. No wheezing, rhonchi or rales.   Chest:      Chest wall: No tenderness.   Abdominal:      General: Abdomen is flat. There is no distension.      Palpations: Abdomen is soft. There is no mass.      Tenderness: There is no abdominal tenderness. There is no guarding or rebound.     Musculoskeletal:      Cervical back: No tenderness.      Right lower leg: No edema.      Left lower leg: No edema.   Lymphadenopathy:      Cervical: No cervical adenopathy.     Skin:     General: Skin is warm.      Coloration: Skin is not jaundiced.     Neurological:      General: No focal deficit present.      Mental Status: He is oriented to person, place, and time.     Psychiatric:         Mood and Affect: Mood normal.         Behavior: Behavior normal.         Performance Status:  Asymptomatic     Latest Reference Range & Units 07/29/25 10:25   GLUCOSE 74 - 99 mg/dL 85   SODIUM 136 - 145 mmol/L 141   POTASSIUM 3.5 - 5.3 mmol/L 4.0   CHLORIDE 98 - 107 mmol/L 101   Bicarbonate 21 - 32 mmol/L 32    Anion Gap 10 - 20 mmol/L 12   Blood Urea Nitrogen 6 - 23 mg/dL 18   Creatinine 0.50 - 1.30 mg/dL 0.70   EGFR >60 mL/min/1.73m*2 >90   Calcium 8.6 - 10.6 mg/dL 9.8   Albumin 3.4 - 5.0 g/dL 4.4   Alkaline Phosphatase 33 - 136 U/L 114   ALT 10 - 52 U/L 35   AST 9 - 39 U/L 24   Bilirubin Total 0.0 - 1.2 mg/dL 0.6   Total Protein 6.4 - 8.2 g/dL 6.9   Thyroid Stimulating Hormone 0.44 - 3.98 mIU/L 1.57   WBC 4.4 - 11.3 x10*3/uL 5.6   nRBC 0.0 - 0.0 /100 WBCs 0.0   RBC 4.50 - 5.90 x10*6/uL 4.46 (L)   HEMOGLOBIN 13.5 - 17.5 g/dL 14.0   HEMATOCRIT 41.0 - 52.0 % 45.7   MCV 80 - 100 fL 103 (H)   MCH 26.0 - 34.0 pg 31.4   MCHC 32.0 - 36.0 g/dL 30.6 (L)   RED CELL DISTRIBUTION WIDTH 11.5 - 14.5 % 14.8 (H)   Platelets 150 - 450 x10*3/uL 314   Neutrophils % 40.0 - 80.0 % 66.9   Immature Granulocytes %, Automated 0.0 - 0.9 % 0.4   Lymphocytes % 13.0 - 44.0 % 19.6   Monocytes % 2.0 - 10.0 % 8.4   Eosinophils % 0.0 - 6.0 % 3.6   Basophils % 0.0 - 2.0 % 1.1   Neutrophils Absolute 1.60 - 5.50 x10*3/uL 3.76   Immature Granulocytes Absolute, Automated 0.00 - 0.50 x10*3/uL 0.02   Lymphocytes Absolute 0.80 - 3.00 x10*3/uL 1.10   Monocytes Absolute 0.05 - 0.80 x10*3/uL 0.47   Eosinophils Absolute 0.00 - 0.40 x10*3/uL 0.20   Basophils Absolute 0.00 - 0.10 x10*3/uL 0.06   (L): Data is abnormally low  (H): Data is abnormally high      CT CHEST ABDOMEN PELVIS W IV CONTRAST; 7/31/2025   IMPRESSION:  Restaging scan compared to 04/04/2025. No significant interval change and no evidence of new metastatic disease in the chest abdomen or pelvis. Additional stable chronic and incidental findings as described above.      Assessment/Plan     This is a 79-year-old gentleman with right pleural mesothelioma with involvement of the right chest and peritoneum and mediastinum who was treated with combination immunotherapy from February 2022 through February 2024 with a complete response to treatment.  He is now been on observation for the past year and a  half without evidence of disease recurrence or progression.  I personally reviewed his most recent CT scan of the chest abdomen pelvis dated July 31, 2025 showing no progression.  Will see him back in 4 months time with repeat imaging studies and blood work.  Given his slight elevated MCV I will be checking vitamin B12, and folate at next visit.    Cancer Staging   No matching staging information was found for the patient.      Oncology History   Malignant neoplasm of mesothelial tissue (Multi)   2/27/2023 Initial Diagnosis    Malignant neoplasm of mesothelial tissue (CMS/HCC)     10/4/2023 - 2/7/2024 Chemotherapy    Nivolumab 240 mg (Biweekly), 28 Day Cycles                   Shawn Blum MD

## 2025-08-13 ENCOUNTER — APPOINTMENT (OUTPATIENT)
Dept: SURGERY | Facility: CLINIC | Age: 80
End: 2025-08-13
Payer: MEDICARE

## 2025-08-20 ENCOUNTER — OFFICE VISIT (OUTPATIENT)
Dept: SURGERY | Facility: CLINIC | Age: 80
End: 2025-08-20
Payer: MEDICARE

## 2025-08-20 VITALS
SYSTOLIC BLOOD PRESSURE: 123 MMHG | TEMPERATURE: 98.2 F | HEART RATE: 91 BPM | DIASTOLIC BLOOD PRESSURE: 69 MMHG | HEIGHT: 70 IN | WEIGHT: 184.4 LBS | BODY MASS INDEX: 26.4 KG/M2 | OXYGEN SATURATION: 100 %

## 2025-08-20 DIAGNOSIS — C45.9 MALIGNANT NEOPLASM OF MESOTHELIAL TISSUE (MULTI): Primary | ICD-10-CM

## 2025-08-20 PROCEDURE — 1126F AMNT PAIN NOTED NONE PRSNT: CPT | Performed by: THORACIC SURGERY (CARDIOTHORACIC VASCULAR SURGERY)

## 2025-08-20 PROCEDURE — 99214 OFFICE O/P EST MOD 30 MIN: CPT | Performed by: THORACIC SURGERY (CARDIOTHORACIC VASCULAR SURGERY)

## 2025-08-20 PROCEDURE — 3074F SYST BP LT 130 MM HG: CPT | Performed by: THORACIC SURGERY (CARDIOTHORACIC VASCULAR SURGERY)

## 2025-08-20 PROCEDURE — 3078F DIAST BP <80 MM HG: CPT | Performed by: THORACIC SURGERY (CARDIOTHORACIC VASCULAR SURGERY)

## 2025-08-20 PROCEDURE — 1159F MED LIST DOCD IN RCRD: CPT | Performed by: THORACIC SURGERY (CARDIOTHORACIC VASCULAR SURGERY)

## 2025-08-20 ASSESSMENT — ENCOUNTER SYMPTOMS
LOSS OF SENSATION IN FEET: 1
DEPRESSION: 0
OCCASIONAL FEELINGS OF UNSTEADINESS: 0

## 2025-08-20 ASSESSMENT — LIFESTYLE VARIABLES
AUDIT-C TOTAL SCORE: 0
HOW OFTEN DO YOU HAVE A DRINK CONTAINING ALCOHOL: NEVER
SKIP TO QUESTIONS 9-10: 1
HOW MANY STANDARD DRINKS CONTAINING ALCOHOL DO YOU HAVE ON A TYPICAL DAY: PATIENT DOES NOT DRINK
HOW OFTEN DO YOU HAVE SIX OR MORE DRINKS ON ONE OCCASION: NEVER

## 2025-08-20 ASSESSMENT — PAIN SCALES - GENERAL: PAINLEVEL_OUTOF10: 0-NO PAIN

## 2025-08-29 ENCOUNTER — APPOINTMENT (OUTPATIENT)
Facility: CLINIC | Age: 80
End: 2025-08-29
Payer: MEDICARE

## 2025-08-29 VITALS
WEIGHT: 186.8 LBS | SYSTOLIC BLOOD PRESSURE: 131 MMHG | TEMPERATURE: 97.9 F | HEIGHT: 70 IN | DIASTOLIC BLOOD PRESSURE: 70 MMHG | BODY MASS INDEX: 26.74 KG/M2

## 2025-08-29 DIAGNOSIS — M06.00 SERONEGATIVE RHEUMATOID ARTHRITIS (MULTI): Primary | ICD-10-CM

## 2025-08-29 DIAGNOSIS — L40.9 PSORIASIS: ICD-10-CM

## 2025-08-29 DIAGNOSIS — Z79.899 HIGH RISK MEDICATION USE: ICD-10-CM

## 2025-08-29 DIAGNOSIS — D84.9 IMMUNOSUPPRESSION: ICD-10-CM

## 2025-08-29 DIAGNOSIS — Z29.89 IMMUNOTHERAPY: ICD-10-CM

## 2025-08-29 PROCEDURE — 1036F TOBACCO NON-USER: CPT | Performed by: INTERNAL MEDICINE

## 2025-08-29 PROCEDURE — 1159F MED LIST DOCD IN RCRD: CPT | Performed by: INTERNAL MEDICINE

## 2025-08-29 PROCEDURE — 3075F SYST BP GE 130 - 139MM HG: CPT | Performed by: INTERNAL MEDICINE

## 2025-08-29 PROCEDURE — 3078F DIAST BP <80 MM HG: CPT | Performed by: INTERNAL MEDICINE

## 2025-08-29 PROCEDURE — G2211 COMPLEX E/M VISIT ADD ON: HCPCS | Performed by: INTERNAL MEDICINE

## 2025-08-29 PROCEDURE — 99215 OFFICE O/P EST HI 40 MIN: CPT | Performed by: INTERNAL MEDICINE

## 2025-08-29 ASSESSMENT — ENCOUNTER SYMPTOMS
GASTROINTESTINAL NEGATIVE: 1
HEMATOLOGIC/LYMPHATIC NEGATIVE: 1
CARDIOVASCULAR NEGATIVE: 1
ALLERGIC/IMMUNOLOGIC NEGATIVE: 1
NEUROLOGICAL NEGATIVE: 1
ARTHRALGIAS: 1
PSYCHIATRIC NEGATIVE: 1
CONSTITUTIONAL NEGATIVE: 1
EYES NEGATIVE: 1

## 2025-09-25 ENCOUNTER — APPOINTMENT (OUTPATIENT)
Dept: CARDIOLOGY | Facility: CLINIC | Age: 80
End: 2025-09-25
Payer: MEDICARE

## 2025-11-18 ENCOUNTER — APPOINTMENT (OUTPATIENT)
Dept: PRIMARY CARE | Facility: CLINIC | Age: 80
End: 2025-11-18
Payer: MEDICARE

## 2026-01-23 ENCOUNTER — APPOINTMENT (OUTPATIENT)
Facility: CLINIC | Age: 81
End: 2026-01-23
Payer: MEDICARE